# Patient Record
Sex: FEMALE | Race: WHITE | NOT HISPANIC OR LATINO
[De-identification: names, ages, dates, MRNs, and addresses within clinical notes are randomized per-mention and may not be internally consistent; named-entity substitution may affect disease eponyms.]

---

## 2019-11-13 ENCOUNTER — APPOINTMENT (OUTPATIENT)
Dept: INTERNAL MEDICINE | Facility: CLINIC | Age: 74
End: 2019-11-13
Payer: MEDICARE

## 2019-11-13 VITALS
WEIGHT: 149 LBS | HEIGHT: 61 IN | HEART RATE: 84 BPM | DIASTOLIC BLOOD PRESSURE: 80 MMHG | TEMPERATURE: 98.2 F | OXYGEN SATURATION: 96 % | BODY MASS INDEX: 28.13 KG/M2 | SYSTOLIC BLOOD PRESSURE: 140 MMHG

## 2019-11-13 VITALS — SYSTOLIC BLOOD PRESSURE: 122 MMHG | DIASTOLIC BLOOD PRESSURE: 80 MMHG

## 2019-11-13 DIAGNOSIS — Z87.42 PERSONAL HISTORY OF OTHER DISEASES OF THE FEMALE GENITAL TRACT: ICD-10-CM

## 2019-11-13 DIAGNOSIS — Z87.891 PERSONAL HISTORY OF NICOTINE DEPENDENCE: ICD-10-CM

## 2019-11-13 DIAGNOSIS — Z80.9 FAMILY HISTORY OF MALIGNANT NEOPLASM, UNSPECIFIED: ICD-10-CM

## 2019-11-13 DIAGNOSIS — Z80.0 FAMILY HISTORY OF MALIGNANT NEOPLASM OF DIGESTIVE ORGANS: ICD-10-CM

## 2019-11-13 DIAGNOSIS — Z82.49 FAMILY HISTORY OF ISCHEMIC HEART DISEASE AND OTHER DISEASES OF THE CIRCULATORY SYSTEM: ICD-10-CM

## 2019-11-13 DIAGNOSIS — Z80.3 FAMILY HISTORY OF MALIGNANT NEOPLASM OF BREAST: ICD-10-CM

## 2019-11-13 DIAGNOSIS — E55.9 VITAMIN D DEFICIENCY, UNSPECIFIED: ICD-10-CM

## 2019-11-13 DIAGNOSIS — Z80.52 FAMILY HISTORY OF MALIGNANT NEOPLASM OF BLADDER: ICD-10-CM

## 2019-11-13 DIAGNOSIS — Z81.8 FAMILY HISTORY OF OTHER MENTAL AND BEHAVIORAL DISORDERS: ICD-10-CM

## 2019-11-13 DIAGNOSIS — S92.902A UNSPECIFIED FRACTURE OF LEFT FOOT, INITIAL ENCOUNTER FOR CLOSED FRACTURE: ICD-10-CM

## 2019-11-13 PROCEDURE — 90662 IIV NO PRSV INCREASED AG IM: CPT

## 2019-11-13 PROCEDURE — G0008: CPT

## 2019-11-13 PROCEDURE — 99204 OFFICE O/P NEW MOD 45 MIN: CPT | Mod: 25

## 2019-11-13 RX ORDER — BENZALKONIUM CHLORIDE, SODIUM PHOSPHATE, DIBASIC, EDETATE DISODIUM,SODIUM PHOSPHATE, MONOBASIC, SODIUM CHLORIDE, WATER, PHOSPHORIC ACID, SODIUM HYDROXIDE 14 MG/ML
1.4 SOLUTION INTRAOCULAR 4 TIMES DAILY
Refills: 0 | Status: ACTIVE | COMMUNITY
Start: 2019-11-13

## 2019-11-13 NOTE — HISTORY OF PRESENT ILLNESS
[FreeTextEntry8] : Patient presented for the first time for transition of care, she's looking for a new PCP.  Her PCP, Dr. Lira, has retired.  Her last PE and blood tests was about 6 months ago.\par \par She needs Flu vaccine and renewal of medications.\par \par Pt is currently on ABx, low dose for 2 months for recurrent UTI.

## 2019-11-13 NOTE — PHYSICAL EXAM
[No Acute Distress] : no acute distress [Well Nourished] : well nourished [Well Developed] : well developed [No JVD] : no jugular venous distention [Supple] : supple [No Respiratory Distress] : no respiratory distress  [Clear to Auscultation] : lungs were clear to auscultation bilaterally [Normal Rate] : normal rate  [Regular Rhythm] : with a regular rhythm [Normal S1, S2] : normal S1 and S2 [No Edema] : there was no peripheral edema [No Extremity Clubbing/Cyanosis] : no extremity clubbing/cyanosis [Soft] : abdomen soft [Non Tender] : non-tender [Non-distended] : non-distended [Normal Bowel Sounds] : normal bowel sounds [No CVA Tenderness] : no CVA  tenderness [No Spinal Tenderness] : no spinal tenderness [No Joint Swelling] : no joint swelling [Grossly Normal Strength/Tone] : grossly normal strength/tone [Speech Grossly Normal] : speech grossly normal [Normal Affect] : the affect was normal [Alert and Oriented x3] : oriented to person, place, and time [Normal Mood] : the mood was normal [de-identified] : overweight female in stated age,

## 2019-11-21 LAB
ALBUMIN SERPL ELPH-MCNC: 4.3 G/DL
ALP BLD-CCNC: 97 U/L
ALT SERPL-CCNC: 11 U/L
ANION GAP SERPL CALC-SCNC: 13 MMOL/L
AST SERPL-CCNC: 26 U/L
BILIRUB SERPL-MCNC: 0.6 MG/DL
BUN SERPL-MCNC: 16 MG/DL
CALCIUM SERPL-MCNC: 10.3 MG/DL
CHLORIDE SERPL-SCNC: 101 MMOL/L
CHOLEST SERPL-MCNC: 235 MG/DL
CHOLEST/HDLC SERPL: 3.9 RATIO
CK SERPL-CCNC: 39 U/L
CO2 SERPL-SCNC: 28 MMOL/L
CREAT SERPL-MCNC: 0.75 MG/DL
ESTIMATED AVERAGE GLUCOSE: 105 MG/DL
GLUCOSE SERPL-MCNC: 97 MG/DL
HBA1C MFR BLD HPLC: 5.3 %
HDLC SERPL-MCNC: 61 MG/DL
LDLC SERPL CALC-MCNC: 156 MG/DL
POTASSIUM SERPL-SCNC: 4.5 MMOL/L
PROT SERPL-MCNC: 6.6 G/DL
SODIUM SERPL-SCNC: 142 MMOL/L
TRIGL SERPL-MCNC: 92 MG/DL

## 2020-01-24 ENCOUNTER — MOBILE ON CALL (OUTPATIENT)
Age: 75
End: 2020-01-24

## 2020-01-27 LAB
ALBUMIN SERPL ELPH-MCNC: 4.4 G/DL
ALP BLD-CCNC: 96 U/L
ALT SERPL-CCNC: 26 U/L
ANION GAP SERPL CALC-SCNC: 15 MMOL/L
AST SERPL-CCNC: 29 U/L
BILIRUB SERPL-MCNC: 0.6 MG/DL
BUN SERPL-MCNC: 20 MG/DL
CALCIUM SERPL-MCNC: 10.4 MG/DL
CHLORIDE SERPL-SCNC: 102 MMOL/L
CHOLEST SERPL-MCNC: 147 MG/DL
CHOLEST/HDLC SERPL: 2.7 RATIO
CK SERPL-CCNC: 125 U/L
CO2 SERPL-SCNC: 25 MMOL/L
CREAT SERPL-MCNC: 0.79 MG/DL
GLUCOSE SERPL-MCNC: 106 MG/DL
HDLC SERPL-MCNC: 54 MG/DL
LDLC SERPL CALC-MCNC: 81 MG/DL
POTASSIUM SERPL-SCNC: 4.6 MMOL/L
PROT SERPL-MCNC: 6.4 G/DL
SODIUM SERPL-SCNC: 143 MMOL/L
TRIGL SERPL-MCNC: 57 MG/DL

## 2020-02-03 ENCOUNTER — APPOINTMENT (OUTPATIENT)
Dept: INTERNAL MEDICINE | Facility: CLINIC | Age: 75
End: 2020-02-03
Payer: MEDICARE

## 2020-02-03 VITALS
BODY MASS INDEX: 29.64 KG/M2 | DIASTOLIC BLOOD PRESSURE: 88 MMHG | OXYGEN SATURATION: 95 % | HEART RATE: 84 BPM | TEMPERATURE: 98.6 F | HEIGHT: 61 IN | SYSTOLIC BLOOD PRESSURE: 130 MMHG | WEIGHT: 157 LBS

## 2020-02-03 VITALS — DIASTOLIC BLOOD PRESSURE: 84 MMHG | SYSTOLIC BLOOD PRESSURE: 142 MMHG

## 2020-02-03 PROCEDURE — 99214 OFFICE O/P EST MOD 30 MIN: CPT

## 2020-02-03 RX ORDER — SERTRALINE HYDROCHLORIDE 100 MG/1
100 TABLET, FILM COATED ORAL
Qty: 90 | Refills: 1 | Status: COMPLETED | COMMUNITY
Start: 2019-11-13 | End: 2020-02-03

## 2020-02-03 RX ORDER — CHROMIUM 200 MCG
25 MCG TABLET ORAL DAILY
Refills: 0 | Status: ACTIVE | COMMUNITY
Start: 2020-02-03

## 2020-02-03 NOTE — HISTORY OF PRESENT ILLNESS
[de-identified] : Pt presented for f/u.  She had labs done and would like to review results.\par \par Pt woke up a couple of months ago, and had to drag her L leg, and went to see ortho.  She was told she has a pinched nerve.  It was recommended that she has injection to her back, and pt refused.  She went for PT, but she thinks that made it worse.  She had more pain and more difficulty walking.  She used to go to chiropractor and is planning to go do that. She also is getting an inversion table.\par \par She sees a psychiatrist and was started on a new medication 2 weeks ago.  She no longer takes Zoloft.  She feels better now.was having some SEs.  She cut it down to 1/2 tablet of 5 mg (+2.5 mg) daily.\par \par She also has knee pain, L > R.

## 2020-02-03 NOTE — ASSESSMENT
[FreeTextEntry1] : I am still trying to obtain records from her previous MD, as well as records from the orthopedic surgeon.  Patient believes she is due for a physical exam within the next few months.  She will schedule appointment and have her labs done prior to her visit.

## 2020-02-03 NOTE — PHYSICAL EXAM
[No Acute Distress] : no acute distress [Well Nourished] : well nourished [Well Developed] : well developed [No JVD] : no jugular venous distention [No Respiratory Distress] : no respiratory distress  [Clear to Auscultation] : lungs were clear to auscultation bilaterally [Normal Rate] : normal rate  [Regular Rhythm] : with a regular rhythm [Normal S1, S2] : normal S1 and S2 [No Edema] : there was no peripheral edema [No Extremity Clubbing/Cyanosis] : no extremity clubbing/cyanosis [Soft] : abdomen soft [Non Tender] : non-tender [Normal Bowel Sounds] : normal bowel sounds [Normal Supraclavicular Nodes] : no supraclavicular lymphadenopathy [Normal Posterior Cervical Nodes] : no posterior cervical lymphadenopathy [Normal Anterior Cervical Nodes] : no anterior cervical lymphadenopathy [No CVA Tenderness] : no CVA  tenderness [No Spinal Tenderness] : no spinal tenderness [No Joint Swelling] : no joint swelling [Grossly Normal Strength/Tone] : grossly normal strength/tone [Speech Grossly Normal] : speech grossly normal [Normal Affect] : the affect was normal [Alert and Oriented x3] : oriented to person, place, and time [Normal Mood] : the mood was normal [de-identified] : Overweight female in stated age,

## 2020-05-18 ENCOUNTER — RX RENEWAL (OUTPATIENT)
Age: 75
End: 2020-05-18

## 2020-05-20 ENCOUNTER — RX RENEWAL (OUTPATIENT)
Age: 75
End: 2020-05-20

## 2020-08-11 LAB
25(OH)D3 SERPL-MCNC: 62.3 NG/ML
ALBUMIN SERPL ELPH-MCNC: 4 G/DL
ALP BLD-CCNC: 102 U/L
ALT SERPL-CCNC: 26 U/L
ANION GAP SERPL CALC-SCNC: 13 MMOL/L
APPEARANCE: CLEAR
AST SERPL-CCNC: 30 U/L
BASOPHILS # BLD AUTO: 0.05 K/UL
BASOPHILS NFR BLD AUTO: 0.5 %
BILIRUB SERPL-MCNC: 0.7 MG/DL
BILIRUBIN URINE: NEGATIVE
BLOOD URINE: NEGATIVE
BUN SERPL-MCNC: 21 MG/DL
CALCIUM SERPL-MCNC: 10.1 MG/DL
CHLORIDE SERPL-SCNC: 105 MMOL/L
CHOLEST SERPL-MCNC: 121 MG/DL
CHOLEST/HDLC SERPL: 2.5 RATIO
CK SERPL-CCNC: 53 U/L
CO2 SERPL-SCNC: 26 MMOL/L
COLOR: YELLOW
CREAT SERPL-MCNC: 0.85 MG/DL
CRP SERPL-MCNC: 0.73 MG/DL
EOSINOPHIL # BLD AUTO: 0.22 K/UL
EOSINOPHIL NFR BLD AUTO: 2.1 %
ERYTHROCYTE [SEDIMENTATION RATE] IN BLOOD BY WESTERGREN METHOD: 14 MM/HR
ESTIMATED AVERAGE GLUCOSE: 117 MG/DL
GLUCOSE QUALITATIVE U: NEGATIVE
GLUCOSE SERPL-MCNC: 110 MG/DL
HBA1C MFR BLD HPLC: 5.7 %
HCT VFR BLD CALC: 44.6 %
HDLC SERPL-MCNC: 49 MG/DL
HGB BLD-MCNC: 13.6 G/DL
IMM GRANULOCYTES NFR BLD AUTO: 0.4 %
KETONES URINE: NEGATIVE
LDLC SERPL CALC-MCNC: 63 MG/DL
LEUKOCYTE ESTERASE URINE: NEGATIVE
LYMPHOCYTES # BLD AUTO: 2.95 K/UL
LYMPHOCYTES NFR BLD AUTO: 27.9 %
MAN DIFF?: NORMAL
MCHC RBC-ENTMCNC: 28 PG
MCHC RBC-ENTMCNC: 30.5 GM/DL
MCV RBC AUTO: 92 FL
MONOCYTES # BLD AUTO: 0.72 K/UL
MONOCYTES NFR BLD AUTO: 6.8 %
NEUTROPHILS # BLD AUTO: 6.61 K/UL
NEUTROPHILS NFR BLD AUTO: 62.3 %
NITRITE URINE: NEGATIVE
PH URINE: 6
PLATELET # BLD AUTO: 226 K/UL
POTASSIUM SERPL-SCNC: 4.1 MMOL/L
PROT SERPL-MCNC: 6.7 G/DL
PROTEIN URINE: NORMAL
RBC # BLD: 4.85 M/UL
RBC # FLD: 14 %
SODIUM SERPL-SCNC: 144 MMOL/L
SPECIFIC GRAVITY URINE: 1.03
T4 FREE SERPL-MCNC: 1.2 NG/DL
TRIGL SERPL-MCNC: 48 MG/DL
TSH SERPL-ACNC: 0.83 UIU/ML
UROBILINOGEN URINE: NORMAL
WBC # FLD AUTO: 10.59 K/UL

## 2020-08-13 ENCOUNTER — APPOINTMENT (OUTPATIENT)
Dept: INTERNAL MEDICINE | Facility: CLINIC | Age: 75
End: 2020-08-13
Payer: MEDICARE

## 2020-08-13 VITALS
OXYGEN SATURATION: 95 % | DIASTOLIC BLOOD PRESSURE: 79 MMHG | BODY MASS INDEX: 30.78 KG/M2 | HEIGHT: 61 IN | HEART RATE: 98 BPM | SYSTOLIC BLOOD PRESSURE: 113 MMHG | WEIGHT: 163 LBS

## 2020-08-13 VITALS — TEMPERATURE: 98.7 F | DIASTOLIC BLOOD PRESSURE: 72 MMHG | SYSTOLIC BLOOD PRESSURE: 110 MMHG

## 2020-08-13 DIAGNOSIS — E66.3 OVERWEIGHT: ICD-10-CM

## 2020-08-13 DIAGNOSIS — M19.90 UNSPECIFIED OSTEOARTHRITIS, UNSPECIFIED SITE: ICD-10-CM

## 2020-08-13 DIAGNOSIS — R73.02 IMPAIRED GLUCOSE TOLERANCE (ORAL): ICD-10-CM

## 2020-08-13 DIAGNOSIS — E04.2 NONTOXIC MULTINODULAR GOITER: ICD-10-CM

## 2020-08-13 PROCEDURE — G0009: CPT

## 2020-08-13 PROCEDURE — G0439: CPT

## 2020-08-13 PROCEDURE — G0444 DEPRESSION SCREEN ANNUAL: CPT | Mod: 59

## 2020-08-13 PROCEDURE — 93000 ELECTROCARDIOGRAM COMPLETE: CPT

## 2020-08-13 PROCEDURE — 90670 PCV13 VACCINE IM: CPT

## 2020-08-13 RX ORDER — NITROFURANTOIN MACROCRYSTALS 50 MG/1
50 CAPSULE ORAL
Qty: 30 | Refills: 2 | Status: COMPLETED | COMMUNITY
Start: 2019-11-13 | End: 2020-08-13

## 2020-08-13 RX ORDER — VITAMIN B COMPLEX
CAPSULE ORAL DAILY
Refills: 0 | Status: ACTIVE | COMMUNITY
Start: 2020-08-13

## 2020-08-13 NOTE — DATA REVIEWED
[FreeTextEntry1] : thyroid US - 08/2019\par \par Derm - >5 years ago\par \par EKG - deferred, pt stated she had EKG and stress test with cardiologist a couple of months ago.

## 2020-08-13 NOTE — REVIEW OF SYSTEMS
[Negative] : Heme/Lymph [Lower Ext Edema] : lower extremity edema [Joint Pain] : joint pain [Muscle Pain] : muscle pain [Depression] : depression [Insomnia] : insomnia [Anxiety] : anxiety [Fever] : no fever [Chills] : no chills [Fatigue] : no fatigue [Recent Change In Weight] : ~T no recent weight change [Chest Pain] : no chest pain [Palpitations] : no palpitations [Shortness Of Breath] : no shortness of breath [Wheezing] : no wheezing [Cough] : no cough [Constipation] : no constipation [Diarrhea] : diarrhea [Melena] : no melena [Dysuria] : no dysuria [Incontinence] : no incontinence [Nocturia] : no nocturia [Hematuria] : no hematuria [Back Pain] : no back pain [Itching] : no itching [Mole Changes] : no mole changes [Skin Rash] : no skin rash [Headache] : no headache [Dizziness] : no dizziness [Fainting] : no fainting [Unsteady Walking] : no ataxia [FreeTextEntry3] : wears reading glasses,  [FreeTextEntry5] : occ L ankle swelling, had injury in the past, [FreeTextEntry9] : Has fibromyalgia, [de-identified] : takes Klonopin, as in HPI,

## 2020-08-13 NOTE — COUNSELING
[Fall prevention counseling provided] : Fall prevention counseling provided [Adequate lighting] : Adequate lighting [No throw rugs] : No throw rugs [Use proper foot wear] : Use proper foot wear [Potential consequences of obesity discussed] : Potential consequences of obesity discussed [Benefits of weight loss discussed] : Benefits of weight loss discussed [Encouraged to increase physical activity] : Encouraged to increase physical activity [Target Wt Loss Goal ___] : Weight Loss Goals: Target weight loss goal [unfilled] lbs [Decrease Portions] : decrease portions [____ min/wk Activity] : [unfilled] min/wk activity [Needs reinforcement, provided] : Patient needs reinforcement on understanding of disease, goals and obesity follow-up plan; reinforcement was provided

## 2020-08-13 NOTE — PAST MEDICAL HISTORY
[Postmenopausal] : postmenopausal [Menopause Age____] : age at menopause was [unfilled] [Menarche Age ____] : age at menarche was [unfilled] [Total Preg ___] : G[unfilled] [Live Births ___] : P[unfilled]

## 2020-08-13 NOTE — ASSESSMENT
[FreeTextEntry1] : Patient is up-to-date with all HCM tests.  She is scheduled to repeat breast imaging studies and has prescription to do so.  She is also scheduled to have a repeat colonoscopy in a few days.  She was reminded to have routine eye exam, dental care and skin exam with dermatologist.

## 2020-08-13 NOTE — PHYSICAL EXAM
[No Acute Distress] : no acute distress [Well Nourished] : well nourished [Well Developed] : well developed [Normal Sclera/Conjunctiva] : normal sclera/conjunctiva [PERRL] : pupils equal round and reactive to light [EOMI] : extraocular movements intact [Normal Oropharynx] : the oropharynx was normal [No JVD] : no jugular venous distention [Normal Outer Ear/Nose] : the outer ears and nose were normal in appearance [No Lymphadenopathy] : no lymphadenopathy [Supple] : supple [No Respiratory Distress] : no respiratory distress  [Clear to Auscultation] : lungs were clear to auscultation bilaterally [Normal Rate] : normal rate  [Regular Rhythm] : with a regular rhythm [Normal S1, S2] : normal S1 and S2 [No Carotid Bruits] : no carotid bruits [Pedal Pulses Present] : the pedal pulses are present [No Edema] : there was no peripheral edema [No Extremity Clubbing/Cyanosis] : no extremity clubbing/cyanosis [Soft] : abdomen soft [Non Tender] : non-tender [Non-distended] : non-distended [Normal Posterior Cervical Nodes] : no posterior cervical lymphadenopathy [Normal Bowel Sounds] : normal bowel sounds [No CVA Tenderness] : no CVA  tenderness [Normal Anterior Cervical Nodes] : no anterior cervical lymphadenopathy [No Spinal Tenderness] : no spinal tenderness [Grossly Normal Strength/Tone] : grossly normal strength/tone [No Joint Swelling] : no joint swelling [No Rash] : no rash [Coordination Grossly Intact] : coordination grossly intact [No Focal Deficits] : no focal deficits [Normal Gait] : normal gait [Normal Affect] : the affect was normal [Normal TMs] : both tympanic membranes were normal [Normal Nasal Mucosa] : the nasal mucosa was normal [Normal Appearance] : normal in appearance [No Masses] : no palpable masses [No Nipple Discharge] : no nipple discharge [No Axillary Lymphadenopathy] : no axillary lymphadenopathy [Declined Rectal Exam] : declined rectal exam [Normal Supraclavicular Nodes] : no supraclavicular lymphadenopathy [Normal Axillary Nodes] : no axillary lymphadenopathy [Speech Grossly Normal] : speech grossly normal [Alert and Oriented x3] : oriented to person, place, and time [Normal Mood] : the mood was normal [de-identified] : female in stated age,  [de-identified] : thyromegaly noted, [FreeTextEntry1] : Pt refused, as she is scheduled to have a repeat colonoscopy in a few days.

## 2020-08-13 NOTE — HISTORY OF PRESENT ILLNESS
[de-identified] : Pt presented for PE.  Last PE was was in 2019 with another MD.  Pt started seeing me in 11/2019.\par \par Pt is followed by psychiatrist and taking Zoloft, pt had SE, and she then taking the lower dose but still did not like it..  She was switched over to Olanzapine, but only take 1/2 of 5 mg.  She is on Klonopin at bedtime.  Her psychiatrist is not available to her and she is looking for a new psychiatrist.\par \par She also had some leg weakness, and ortho eval was significant for lumbar radiculopathy.  She was sent for PT, which did not help, and may have made it worse.  Her back pain is not bad unless she stands for too long, it's not bad with walking.  She has had epidural injection.  Her symptoms are not so bad now, she will f/u with ortho as needed.\par \par Pt was well and did not get sick throughout the COVID pandemic.   She was depressed because she couldn't travel to visit her children. \par \par Pt saw pulmonologist a few months ago, had Xray and PFTs, no new meds offered.  She was given inhaler in the past, and did not use it.\par \par Pt would like to have pneumonia vaccine.

## 2020-08-13 NOTE — HEALTH RISK ASSESSMENT
[Yes] : Yes [Monthly or less (1 pt)] : Monthly or less (1 point) [1 or 2 (0 pts)] : 1 or 2 (0 points) [Never (0 pts)] : Never (0 points) [No] : In the past 12 months have you used drugs other than those required for medical reasons? No [With Patient/Caregiver] : With Patient/Caregiver [Fair] : ~his/her~ current health as fair  [Poor] : ~his/her~ mood as  poor [No falls in past year] : Patient reported no falls in the past year [0] : 1) Little interest or pleasure doing things: Not at all (0) [2] : 2) Feeling down, depressed, or hopeless for more than half of the days (2) [None] : None [With Family] : lives with family [# of Members in Household ___] :  household currently consist of [unfilled] member(s) [Retired] : retired [College] : College [# Of Children ___] : has [unfilled] children [] :  [Feels Safe at Home] : Feels safe at home [Fully functional (using the telephone, shopping, preparing meals, housekeeping, doing laundry, using] : Fully functional and needs no help or supervision to perform IADLs (using the telephone, shopping, preparing meals, housekeeping, doing laundry, using transportation, managing medications and managing finances) [Fully functional (bathing, dressing, toileting, transferring, walking, feeding)] : Fully functional (bathing, dressing, toileting, transferring, walking, feeding) [Smoke Detector] : smoke detector [Seat Belt] :  uses seat belt [Carbon Monoxide Detector] : carbon monoxide detector [] : No [Audit-CScore] : 1 [de-identified] : very active, exercises 10 minutes every day [[id number]] : 2 [LowDoseCTScan] : 05/19 [EyeExamDate] : 12/19 [Change in mental status noted] : No change in mental status noted [Reports changes in hearing] : Reports no changes in hearing [Reports changes in vision] : Reports no changes in vision [Reports changes in dental health] : Reports no changes in dental health [MammogramDate] : 08/19 [MammogramComments] : US breast - 08/19 [PapSmearDate] : 08/19 [BoneDensityDate] : 08/19 [ColonoscopyDate] : 2017 [de-identified] : dentist - 03/20 [AdvancecareDate] : 08/20

## 2020-08-19 ENCOUNTER — TRANSCRIPTION ENCOUNTER (OUTPATIENT)
Age: 75
End: 2020-08-19

## 2020-12-03 ENCOUNTER — APPOINTMENT (OUTPATIENT)
Dept: INTERNAL MEDICINE | Facility: CLINIC | Age: 75
End: 2020-12-03
Payer: MEDICARE

## 2020-12-03 VITALS — TEMPERATURE: 97.8 F

## 2020-12-03 PROCEDURE — 99442: CPT | Mod: 95

## 2020-12-03 NOTE — HISTORY OF PRESENT ILLNESS
[Home] : at home, [unfilled] , at the time of the visit. [Medical Office: ([facility])___] : at the medical office located in  [FreeTextEntry8] : Pt presented for this telephone c/o not feeling well.  She has been having cold sweats for about a week, and is getting worse.  She denied temp, her temp are less than 99 the last few days.  When she eats, she starts coughing with no phlegm and sneezing, this only occurs with breakfast and last about 10 minutes, she also has white rhinorrhea.  She feels like she is nauseas.  She had cold sweats and chills last night and they persisted continuously through this morning, she still has those sweats and chills at the moment of this telephone call.  She feels weak, and does not have much of an appetite.\par \par She also has abdominal pain, no diarrhea.  The pain is intermittent and last a few minutes.  The pain is on L lower abdomen.  She also has some constipation.  Her abdominal pain is better after she has a BMs.  She denied change of urinary habits.\par \par Pt lives alone, and is very reluctant to go to urgent care center for evaluation and COVID testing, because of the long line.

## 2020-12-03 NOTE — ASSESSMENT
[FreeTextEntry1] : 75-year-old female presented with 1-1/2-week of not feeling well.  She started with having cold sweats every night, a cough in the morning after breakfast, nausea, intermittent left-sided abdominal pain and malaise.  She has been checking her temperature and reported no fever.  Her symptoms has been worsening, and she developed cold sweats last night that did not hayley last until the time of our appointment in the late morning.\par \par Differential diagnosis of her illness is very broad.  Given that we are in the midst of COVID-19 pandemic, the patient needs to be tested for COVID-19.  Other possibility could be influenza, UTI, URI/pneumonia/bronchitis, diverticulitis or colitis.  Without the exam, it would be very difficult to make a diagnosis.  I offered to send in a team to do blood tests at home, but we may not have any results for at least 2 to 3 days.  Patient is extremely reluctant to go to urgent care center, I advised that she go to ED for further evaluation and management, especially because her symptoms seems to be worsening.  She was agreeable and will call someone to take her to the ED today.

## 2020-12-04 ENCOUNTER — NON-APPOINTMENT (OUTPATIENT)
Age: 75
End: 2020-12-04

## 2020-12-05 ENCOUNTER — TRANSCRIPTION ENCOUNTER (OUTPATIENT)
Age: 75
End: 2020-12-05

## 2020-12-07 ENCOUNTER — APPOINTMENT (OUTPATIENT)
Dept: INTERNAL MEDICINE | Facility: CLINIC | Age: 75
End: 2020-12-07
Payer: MEDICARE

## 2020-12-07 VITALS
OXYGEN SATURATION: 95 % | DIASTOLIC BLOOD PRESSURE: 78 MMHG | WEIGHT: 139 LBS | HEART RATE: 107 BPM | HEIGHT: 61 IN | BODY MASS INDEX: 26.24 KG/M2 | SYSTOLIC BLOOD PRESSURE: 116 MMHG | TEMPERATURE: 98.9 F

## 2020-12-07 DIAGNOSIS — R53.81 OTHER MALAISE: ICD-10-CM

## 2020-12-07 DIAGNOSIS — Z87.898 PERSONAL HISTORY OF OTHER SPECIFIED CONDITIONS: ICD-10-CM

## 2020-12-07 DIAGNOSIS — R68.89 OTHER GENERAL SYMPTOMS AND SIGNS: ICD-10-CM

## 2020-12-07 PROCEDURE — 99213 OFFICE O/P EST LOW 20 MIN: CPT | Mod: 25

## 2020-12-07 PROCEDURE — G0008: CPT

## 2020-12-07 PROCEDURE — 90662 IIV NO PRSV INCREASED AG IM: CPT

## 2020-12-08 ENCOUNTER — NON-APPOINTMENT (OUTPATIENT)
Age: 75
End: 2020-12-08

## 2020-12-08 PROBLEM — Z87.898 HISTORY OF ABDOMINAL PAIN: Status: RESOLVED | Noted: 2020-12-03 | Resolved: 2020-12-08

## 2020-12-08 PROBLEM — R68.89 COLD SWEAT: Status: RESOLVED | Noted: 2020-12-03 | Resolved: 2020-12-08

## 2020-12-08 PROBLEM — Z87.898 HISTORY OF NAUSEA: Status: RESOLVED | Noted: 2020-12-03 | Resolved: 2020-12-08

## 2020-12-08 PROBLEM — R53.81 MALAISE: Status: RESOLVED | Noted: 2020-12-03 | Resolved: 2020-12-08

## 2020-12-08 RX ORDER — OLANZAPINE 5 MG/1
5 TABLET, FILM COATED ORAL
Refills: 0 | Status: COMPLETED | COMMUNITY
Start: 2020-02-03 | End: 2020-12-08

## 2020-12-08 RX ORDER — CLONAZEPAM 1 MG/1
1 TABLET ORAL
Refills: 0 | Status: COMPLETED | COMMUNITY
Start: 2019-11-13 | End: 2020-12-08

## 2020-12-08 NOTE — HISTORY OF PRESENT ILLNESS
[de-identified] : Pt presented for f/u after a visit to ED.  Pt was having chills for 1 1/2 week, then went to ED and was diagnosed with UTI.  She was started on Bactrim.  She had lower abdominal yesterday for about 5 hours, and the pain was about 5/10.  It resolved completely after a few hours.  She also had some chills yesterday.\par \par She feels about 80% better today.  \par \par She was hospitalized in 11/2020 for 1 week at [facility] for worsening of anxiety, she is now seeing a psychiatrist once a month.\par \par Pt would like to have flu vaccine today.

## 2020-12-08 NOTE — PHYSICAL EXAM
[No Acute Distress] : no acute distress [Well Nourished] : well nourished [Well Developed] : well developed [No JVD] : no jugular venous distention [Supple] : supple [No Respiratory Distress] : no respiratory distress  [Clear to Auscultation] : lungs were clear to auscultation bilaterally [Normal Rate] : normal rate  [Regular Rhythm] : with a regular rhythm [Normal S1, S2] : normal S1 and S2 [No Edema] : there was no peripheral edema [Soft] : abdomen soft [Non Tender] : non-tender [Normal Bowel Sounds] : normal bowel sounds [Normal Supraclavicular Nodes] : no supraclavicular lymphadenopathy [Normal Posterior Cervical Nodes] : no posterior cervical lymphadenopathy [Normal Anterior Cervical Nodes] : no anterior cervical lymphadenopathy [No CVA Tenderness] : no CVA  tenderness [No Spinal Tenderness] : no spinal tenderness [No Joint Swelling] : no joint swelling [Grossly Normal Strength/Tone] : grossly normal strength/tone [Speech Grossly Normal] : speech grossly normal [Normal Affect] : the affect was normal [Alert and Oriented x3] : oriented to person, place, and time [Normal Mood] : the mood was normal [de-identified] : female in stated age,

## 2020-12-09 LAB
APPEARANCE: ABNORMAL
BACTERIA UR CULT: NORMAL
BACTERIA: NEGATIVE
BILIRUBIN URINE: NEGATIVE
BLOOD URINE: NEGATIVE
COLOR: YELLOW
GLUCOSE QUALITATIVE U: NEGATIVE
HYALINE CASTS: 0 /LPF
KETONES URINE: NEGATIVE
LEUKOCYTE ESTERASE URINE: NEGATIVE
MICROSCOPIC-UA: NORMAL
NITRITE URINE: NEGATIVE
PH URINE: 7
PROTEIN URINE: ABNORMAL
RED BLOOD CELLS URINE: 2 /HPF
SPECIFIC GRAVITY URINE: 1.02
SQUAMOUS EPITHELIAL CELLS: 10 /HPF
UROBILINOGEN URINE: ABNORMAL
WHITE BLOOD CELLS URINE: 4 /HPF

## 2020-12-15 ENCOUNTER — NON-APPOINTMENT (OUTPATIENT)
Age: 75
End: 2020-12-15

## 2021-02-03 ENCOUNTER — NON-APPOINTMENT (OUTPATIENT)
Age: 76
End: 2021-02-03

## 2021-03-05 ENCOUNTER — APPOINTMENT (OUTPATIENT)
Dept: INTERNAL MEDICINE | Facility: CLINIC | Age: 76
End: 2021-03-05
Payer: MEDICARE

## 2021-03-05 VITALS
SYSTOLIC BLOOD PRESSURE: 126 MMHG | TEMPERATURE: 99 F | HEART RATE: 78 BPM | DIASTOLIC BLOOD PRESSURE: 84 MMHG | WEIGHT: 148 LBS | HEIGHT: 61 IN | OXYGEN SATURATION: 96 % | BODY MASS INDEX: 27.94 KG/M2

## 2021-03-05 VITALS — DIASTOLIC BLOOD PRESSURE: 76 MMHG | SYSTOLIC BLOOD PRESSURE: 120 MMHG

## 2021-03-05 DIAGNOSIS — J43.9 EMPHYSEMA, UNSPECIFIED: ICD-10-CM

## 2021-03-05 PROCEDURE — 99214 OFFICE O/P EST MOD 30 MIN: CPT

## 2021-03-05 RX ORDER — BROMFENAC SODIUM 0.7 MG/ML
0.07 SOLUTION/ DROPS OPHTHALMIC
Refills: 0 | Status: COMPLETED | COMMUNITY
Start: 2019-11-13 | End: 2021-03-05

## 2021-03-05 NOTE — HISTORY OF PRESENT ILLNESS
[de-identified] : Pt presented for follow up.\par \par Pt has been on low dose Nitrofurantoin for the last 2 weeks, but few that it hasn't helped her.\par \par She still has the occ episode of chills, not as bad as it was a few months ago.\par \par Last week, she was watching TV, and she had 3 episodes and slept for 4 hours straight. \par \par Pt already got 2 doses of the Pfizer COVID vaccine.\par \par Pt saw pulmonologist about possible lung nodule seen on imaging study with urgent care center.  Pulmonologist felt that there was no significant finding, and so no f/u needed.\par \par Pt had labs with GI and is waiting for result.

## 2021-03-10 ENCOUNTER — NON-APPOINTMENT (OUTPATIENT)
Age: 76
End: 2021-03-10

## 2021-03-30 ENCOUNTER — NON-APPOINTMENT (OUTPATIENT)
Age: 76
End: 2021-03-30

## 2021-08-09 ENCOUNTER — NON-APPOINTMENT (OUTPATIENT)
Age: 76
End: 2021-08-09

## 2021-08-10 ENCOUNTER — APPOINTMENT (OUTPATIENT)
Dept: INTERNAL MEDICINE | Facility: CLINIC | Age: 76
End: 2021-08-10
Payer: MEDICARE

## 2021-08-10 VITALS
BODY MASS INDEX: 26.81 KG/M2 | HEIGHT: 61 IN | DIASTOLIC BLOOD PRESSURE: 74 MMHG | SYSTOLIC BLOOD PRESSURE: 125 MMHG | TEMPERATURE: 98 F | OXYGEN SATURATION: 94 % | HEART RATE: 106 BPM | WEIGHT: 142 LBS

## 2021-08-10 VITALS — SYSTOLIC BLOOD PRESSURE: 128 MMHG | DIASTOLIC BLOOD PRESSURE: 76 MMHG

## 2021-08-10 DIAGNOSIS — Z00.00 ENCOUNTER FOR GENERAL ADULT MEDICAL EXAMINATION W/OUT ABNORMAL FINDINGS: ICD-10-CM

## 2021-08-10 DIAGNOSIS — M79.7 FIBROMYALGIA: ICD-10-CM

## 2021-08-10 PROCEDURE — 99496 TRANSJ CARE MGMT HIGH F2F 7D: CPT | Mod: 25

## 2021-08-10 PROCEDURE — 99214 OFFICE O/P EST MOD 30 MIN: CPT | Mod: 25

## 2021-08-10 RX ORDER — COLD-HOT PACK
50 EACH MISCELLANEOUS DAILY
Refills: 0 | Status: COMPLETED | COMMUNITY
Start: 2020-08-13 | End: 2021-08-10

## 2021-08-10 NOTE — HISTORY OF PRESENT ILLNESS
[Post-hospitalization from ___ Hospital] : Post-hospitalization from [unfilled] Hospital [Admitted on: ___] : The patient was admitted on [unfilled] [Discharged on ___] : discharged on [unfilled] [Discharge Summary] : discharge summary [Pertinent Labs] : pertinent labs [Discharge Med List] : discharge medication list [Med Reconciliation] : medication reconciliation has been completed [Patient Contacted By: ____] : and contacted by [unfilled] [FreeTextEntry2] : Pt had lower abdominal pain and went to urgent care center a couple of weeks ago.  She had CT of abdomen/pelvic, there was no diverticulitis.  She was noted to have possibly a lipoma in the duodenum.\par \par She was sent to GI, and GI was planning for an EGD.   However, her abdominal pain got worse, and called ambulance and was bought to [facility] where she was hospitalized for 9 days.  She was discharged 2 days ago.  She is on Doxycycline 100 mg BID, and Flagyl 500 mg TID, for unclear etiology.\par \par Urgent care center diagnosed her with UTI, and she was treated with ABx.  Urine studies in the hospital was unremarkable.\par \par In the hospital, she was placed on heart monitor, she apparently presented with near syncope.  Cardiac w/u was unremarkable as per pt.\par \par Due to her complaint of abdominal pain, and possible need for EGD due to CT finding.  EGD was done, but the scope was not long enough to get to the duodenum to evaluate the possible lipoma.  She was therefore discharge.\par \par She is still having pain every day, pain is steady and constant.  She is not constipated.  She has the occ loose stool.  She is able to eat and does not have any N/V.\par \par She woke up this morning with lower abdominal pain, 5/10, but pain is constant.  She has occ nausea, no vomiting, and cold sweats.

## 2021-08-10 NOTE — PHYSICAL EXAM
[No Acute Distress] : no acute distress [Well Nourished] : well nourished [Well Developed] : well developed [No JVD] : no jugular venous distention [Supple] : supple [No Respiratory Distress] : no respiratory distress  [Clear to Auscultation] : lungs were clear to auscultation bilaterally [Normal Rate] : normal rate  [Regular Rhythm] : with a regular rhythm [Normal S1, S2] : normal S1 and S2 [No Edema] : there was no peripheral edema [Soft] : abdomen soft [Non-distended] : non-distended [No Masses] : no abdominal mass palpated [Normal Bowel Sounds] : normal bowel sounds [Normal Supraclavicular Nodes] : no supraclavicular lymphadenopathy [Normal Posterior Cervical Nodes] : no posterior cervical lymphadenopathy [Normal Anterior Cervical Nodes] : no anterior cervical lymphadenopathy [No CVA Tenderness] : no CVA  tenderness [No Spinal Tenderness] : no spinal tenderness [No Joint Swelling] : no joint swelling [Grossly Normal Strength/Tone] : grossly normal strength/tone [Speech Grossly Normal] : speech grossly normal [Normal Affect] : the affect was normal [Alert and Oriented x3] : oriented to person, place, and time [79212 - High Complexity requires an extensive number of possible diagnoses and/or the management options, extensive complexity of the medical data (tests, etc.) to be reviewed, and a high risk of significant complications, morbidity, and/or mortality as w] : High Complexity  [de-identified] : female in stated age,  [de-identified] : diffusely tender with gentle palpation, [de-identified] : Emotion was labile, she was crying at times during OV,

## 2021-08-19 ENCOUNTER — APPOINTMENT (OUTPATIENT)
Dept: INTERNAL MEDICINE | Facility: CLINIC | Age: 76
End: 2021-08-19
Payer: MEDICARE

## 2021-08-19 VITALS
OXYGEN SATURATION: 95 % | DIASTOLIC BLOOD PRESSURE: 75 MMHG | BODY MASS INDEX: 28.13 KG/M2 | WEIGHT: 149 LBS | HEART RATE: 99 BPM | SYSTOLIC BLOOD PRESSURE: 121 MMHG | HEIGHT: 61 IN | TEMPERATURE: 98.6 F

## 2021-08-19 DIAGNOSIS — Z09 ENCOUNTER FOR FOLLOW-UP EXAMINATION AFTER COMPLETED TREATMENT FOR CONDITIONS OTHER THAN MALIGNANT NEOPLASM: ICD-10-CM

## 2021-08-19 DIAGNOSIS — R41.3 OTHER AMNESIA: ICD-10-CM

## 2021-08-19 PROCEDURE — 36415 COLL VENOUS BLD VENIPUNCTURE: CPT

## 2021-08-19 PROCEDURE — 99214 OFFICE O/P EST MOD 30 MIN: CPT | Mod: 25

## 2021-08-19 NOTE — HISTORY OF PRESENT ILLNESS
[FreeTextEntry1] : Establish care [de-identified] : 75 y/o f. hx of depression with anxiety, COPD, HTN, fibromyalgia presenting to [facility] care. Here with brother, Chris. 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 EGD was attempted but scope was not long enough to visualize duodenum.  She was discharged with follow up with her GI physician. Patient states her current GI is unable to perform EGD as does not have the scope to do so. She is pending an appointment with another GI physician in September. She notes intermittent lower abdominal pain w/ intermittent diarrhea which per patient is chronic. \par She is also complaining of chronic memory impairment, evaluated by neurologist in April with normal MRI and diagnosed with mild cognitive impairment.  She denies worsening memory, but remains frustrated that she has this. She is unable to describe what she forgets.\par Her brother has not noticed any worsening memory, but does indicate more anxiety.  \par The MMSE today was 27/30\par She suffers from depression w/ anxiety currently on seroquel, remeron and xanax PRN. She was scheduled to her psychiatrist, but had to reschedule as she was hospitalized. As yet to reschedule.\par

## 2021-08-19 NOTE — REVIEW OF SYSTEMS
[Abdominal Pain] : abdominal pain [Diarrhea] : diarrhea [Memory Loss] : memory loss [Anxiety] : anxiety [Depression] : depression [Negative] : Respiratory [Headache] : no headache [Dizziness] : no dizziness [Insomnia] : no insomnia

## 2021-08-19 NOTE — PHYSICAL EXAM
[Normal] : normal rate, regular rhythm, normal S1 and S2 and no murmur heard [Alert and Oriented x3] : oriented to person, place, and time [Soft] : abdomen soft [Non Tender] : non-tender [Non-distended] : non-distended [de-identified] : Patient appears anxious and at times frustrated with questions

## 2021-08-20 ENCOUNTER — NON-APPOINTMENT (OUTPATIENT)
Age: 76
End: 2021-08-20

## 2021-08-20 LAB — VIT B12 SERPL-MCNC: 400 PG/ML

## 2021-09-14 ENCOUNTER — APPOINTMENT (OUTPATIENT)
Dept: INTERNAL MEDICINE | Facility: CLINIC | Age: 76
End: 2021-09-14
Payer: MEDICARE

## 2021-09-14 VITALS
HEART RATE: 96 BPM | OXYGEN SATURATION: 95 % | TEMPERATURE: 98.7 F | BODY MASS INDEX: 28.32 KG/M2 | DIASTOLIC BLOOD PRESSURE: 87 MMHG | HEIGHT: 61 IN | SYSTOLIC BLOOD PRESSURE: 166 MMHG | RESPIRATION RATE: 18 BRPM | WEIGHT: 150 LBS

## 2021-09-14 LAB
BILIRUB UR QL STRIP: NORMAL
GLUCOSE UR-MCNC: NORMAL
HCG UR QL: 0.2 EU/DL
HGB UR QL STRIP.AUTO: NORMAL
KETONES UR-MCNC: NORMAL
LEUKOCYTE ESTERASE UR QL STRIP: NORMAL
NITRITE UR QL STRIP: NORMAL
PH UR STRIP: 7
PROT UR STRIP-MCNC: NORMAL
SP GR UR STRIP: 1.01

## 2021-09-14 PROCEDURE — 81003 URINALYSIS AUTO W/O SCOPE: CPT | Mod: QW

## 2021-09-14 PROCEDURE — 99213 OFFICE O/P EST LOW 20 MIN: CPT | Mod: 25

## 2021-09-14 NOTE — HISTORY OF PRESENT ILLNESS
[FreeTextEntry8] : CC: UTI symptoms\par \par Patient complaining of dysuria and increase in urination for past one day.  Denies abdominal pain, fevers, or back pain.\par

## 2021-09-16 LAB — BACTERIA UR CULT: NORMAL

## 2021-09-29 ENCOUNTER — APPOINTMENT (OUTPATIENT)
Dept: GASTROENTEROLOGY | Facility: CLINIC | Age: 76
End: 2021-09-29

## 2021-11-03 ENCOUNTER — NON-APPOINTMENT (OUTPATIENT)
Age: 76
End: 2021-11-03

## 2021-11-03 ENCOUNTER — APPOINTMENT (OUTPATIENT)
Dept: INTERNAL MEDICINE | Facility: CLINIC | Age: 76
End: 2021-11-03
Payer: MEDICARE

## 2021-11-03 VITALS
OXYGEN SATURATION: 96 % | HEART RATE: 94 BPM | HEIGHT: 61 IN | DIASTOLIC BLOOD PRESSURE: 74 MMHG | SYSTOLIC BLOOD PRESSURE: 118 MMHG | BODY MASS INDEX: 29.38 KG/M2 | WEIGHT: 155.6 LBS | TEMPERATURE: 98.7 F

## 2021-11-03 DIAGNOSIS — Z00.00 ENCOUNTER FOR GENERAL ADULT MEDICAL EXAMINATION W/OUT ABNORMAL FINDINGS: ICD-10-CM

## 2021-11-03 PROCEDURE — G0439: CPT

## 2021-11-03 PROCEDURE — 93000 ELECTROCARDIOGRAM COMPLETE: CPT

## 2021-11-03 PROCEDURE — 36415 COLL VENOUS BLD VENIPUNCTURE: CPT

## 2021-11-03 NOTE — HEALTH RISK ASSESSMENT
[No] : No [3] : 2) Feeling down, depressed, or hopeless for nearly every day (3) [PHQ-2 Positive] : PHQ-2 Positive [PHQ-9 Negative - No further assessment needed] : PHQ-9 Negative - No further assessment needed [I have developed a follow-up plan documented below in the note.] : I have developed a follow-up plan documented below in the note. [Fully functional (bathing, dressing, toileting, transferring, walking, feeding)] : Fully functional (bathing, dressing, toileting, transferring, walking, feeding) [Fully functional (using the telephone, shopping, preparing meals, housekeeping, doing laundry, using] : Fully functional and needs no help or supervision to perform IADLs (using the telephone, shopping, preparing meals, housekeeping, doing laundry, using transportation, managing medications and managing finances) [[id number]] : 6 [Reports changes in hearing] : Reports no changes in hearing [Reports changes in vision] : Reports no changes in vision [Reports changes in dental health] : Reports no changes in dental health

## 2021-11-03 NOTE — HISTORY OF PRESENT ILLNESS
[FreeTextEntry1] : CPE [de-identified] : Here for CPE\par Not utd w/ mammogram, needs referral\par No regular exercise\par Following w/ mental health provider for anxiety/depressed mood-unable to recall medications started

## 2021-11-03 NOTE — ASSESSMENT
[FreeTextEntry1] : Reviewed age appropriate preventive screening, encouraged daily exercise\par -Refer for mammogram

## 2021-11-04 ENCOUNTER — NON-APPOINTMENT (OUTPATIENT)
Age: 76
End: 2021-11-04

## 2021-11-04 LAB
ALBUMIN SERPL ELPH-MCNC: 3.9 G/DL
ALP BLD-CCNC: 108 U/L
ALT SERPL-CCNC: 36 U/L
ANION GAP SERPL CALC-SCNC: 13 MMOL/L
AST SERPL-CCNC: 23 U/L
BASOPHILS # BLD AUTO: 0.06 K/UL
BASOPHILS NFR BLD AUTO: 0.5 %
BILIRUB SERPL-MCNC: 0.5 MG/DL
BUN SERPL-MCNC: 23 MG/DL
CALCIUM SERPL-MCNC: 10 MG/DL
CHLORIDE SERPL-SCNC: 107 MMOL/L
CHOLEST SERPL-MCNC: 162 MG/DL
CO2 SERPL-SCNC: 22 MMOL/L
CREAT SERPL-MCNC: 0.87 MG/DL
EOSINOPHIL # BLD AUTO: 0.21 K/UL
EOSINOPHIL NFR BLD AUTO: 1.7 %
ESTIMATED AVERAGE GLUCOSE: 114 MG/DL
GLUCOSE SERPL-MCNC: 105 MG/DL
HBA1C MFR BLD HPLC: 5.6 %
HCT VFR BLD CALC: 46.4 %
HDLC SERPL-MCNC: 52 MG/DL
HGB BLD-MCNC: 14.8 G/DL
IMM GRANULOCYTES NFR BLD AUTO: 0.5 %
LDLC SERPL CALC-MCNC: 96 MG/DL
LYMPHOCYTES # BLD AUTO: 2.26 K/UL
LYMPHOCYTES NFR BLD AUTO: 18.7 %
MAN DIFF?: NORMAL
MCHC RBC-ENTMCNC: 28.5 PG
MCHC RBC-ENTMCNC: 31.9 GM/DL
MCV RBC AUTO: 89.4 FL
MONOCYTES # BLD AUTO: 0.73 K/UL
MONOCYTES NFR BLD AUTO: 6 %
NEUTROPHILS # BLD AUTO: 8.75 K/UL
NEUTROPHILS NFR BLD AUTO: 72.6 %
NONHDLC SERPL-MCNC: 110 MG/DL
PLATELET # BLD AUTO: 262 K/UL
POTASSIUM SERPL-SCNC: 3.7 MMOL/L
PROT SERPL-MCNC: 6 G/DL
RBC # BLD: 5.19 M/UL
RBC # FLD: 13.8 %
SODIUM SERPL-SCNC: 142 MMOL/L
TRIGL SERPL-MCNC: 71 MG/DL
WBC # FLD AUTO: 12.07 K/UL

## 2021-11-11 LAB
BASOPHILS # BLD AUTO: 0.05 K/UL
BASOPHILS NFR BLD AUTO: 0.6 %
EOSINOPHIL # BLD AUTO: 0.23 K/UL
EOSINOPHIL NFR BLD AUTO: 2.5 %
HCT VFR BLD CALC: 46.7 %
HGB BLD-MCNC: 14.7 G/DL
IMM GRANULOCYTES NFR BLD AUTO: 0.2 %
LYMPHOCYTES # BLD AUTO: 2.83 K/UL
LYMPHOCYTES NFR BLD AUTO: 31.3 %
MAN DIFF?: NORMAL
MCHC RBC-ENTMCNC: 27.6 PG
MCHC RBC-ENTMCNC: 31.5 GM/DL
MCV RBC AUTO: 87.8 FL
MONOCYTES # BLD AUTO: 0.72 K/UL
MONOCYTES NFR BLD AUTO: 8 %
NEUTROPHILS # BLD AUTO: 5.2 K/UL
NEUTROPHILS NFR BLD AUTO: 57.4 %
PLATELET # BLD AUTO: 243 K/UL
RBC # BLD: 5.32 M/UL
RBC # FLD: 13.8 %
WBC # FLD AUTO: 9.05 K/UL

## 2021-11-12 ENCOUNTER — NON-APPOINTMENT (OUTPATIENT)
Age: 76
End: 2021-11-12

## 2021-11-15 ENCOUNTER — APPOINTMENT (OUTPATIENT)
Dept: INTERNAL MEDICINE | Facility: CLINIC | Age: 76
End: 2021-11-15
Payer: MEDICARE

## 2021-11-15 VITALS
BODY MASS INDEX: 29.45 KG/M2 | OXYGEN SATURATION: 95 % | TEMPERATURE: 98.7 F | SYSTOLIC BLOOD PRESSURE: 147 MMHG | HEART RATE: 93 BPM | DIASTOLIC BLOOD PRESSURE: 80 MMHG | HEIGHT: 61 IN | WEIGHT: 156 LBS

## 2021-11-15 VITALS — DIASTOLIC BLOOD PRESSURE: 80 MMHG | SYSTOLIC BLOOD PRESSURE: 124 MMHG

## 2021-11-15 DIAGNOSIS — R53.83 OTHER FATIGUE: ICD-10-CM

## 2021-11-15 DIAGNOSIS — R10.30 LOWER ABDOMINAL PAIN, UNSPECIFIED: ICD-10-CM

## 2021-11-15 DIAGNOSIS — Z86.2 PERSONAL HISTORY OF DISEASES OF THE BLOOD AND BLOOD-FORMING ORGANS AND CERTAIN DISORDERS INVOLVING THE IMMUNE MECHANISM: ICD-10-CM

## 2021-11-15 DIAGNOSIS — Z87.898 PERSONAL HISTORY OF OTHER SPECIFIED CONDITIONS: ICD-10-CM

## 2021-11-15 DIAGNOSIS — Z23 ENCOUNTER FOR IMMUNIZATION: ICD-10-CM

## 2021-11-15 PROCEDURE — G0008: CPT

## 2021-11-15 PROCEDURE — 90662 IIV NO PRSV INCREASED AG IM: CPT

## 2021-11-15 PROCEDURE — 36415 COLL VENOUS BLD VENIPUNCTURE: CPT

## 2021-11-15 PROCEDURE — 99214 OFFICE O/P EST MOD 30 MIN: CPT | Mod: 25

## 2021-11-15 NOTE — HISTORY OF PRESENT ILLNESS
[FreeTextEntry1] : CC: Tried, concern for recurrent UTI [de-identified] : Notes feeling tired over last few weeks, no changes in sleep or appetite\par Did recently have Seroquel and Remeron increased by psychiatrist, but takes it at bedtime. She does not feel medications are helping her with her mood\par Also concerned about recurrent UTI hx-denies dysuria, hematuria or frequency-patient surprised that she has not had another infection\par Has sedentary lifestyle-no exercise\par

## 2021-11-15 NOTE — ASSESSMENT
[FreeTextEntry1] : Reviewed recent CPE labs-unremarkable\par Unclear if related to possible changes in psych medications, check other labs first, UA\par Encouraged daily walks

## 2021-11-16 LAB
25(OH)D3 SERPL-MCNC: 41.2 NG/ML
APPEARANCE: CLEAR
BILIRUBIN URINE: NEGATIVE
BLOOD URINE: NEGATIVE
COLOR: NORMAL
GLUCOSE QUALITATIVE U: NEGATIVE
KETONES URINE: NEGATIVE
LEUKOCYTE ESTERASE URINE: NEGATIVE
NITRITE URINE: NEGATIVE
PH URINE: 6
PROTEIN URINE: NEGATIVE
SPECIFIC GRAVITY URINE: 1.02
TSH SERPL-ACNC: 1.2 UIU/ML
UROBILINOGEN URINE: NORMAL
VIT B12 SERPL-MCNC: 331 PG/ML

## 2021-11-22 ENCOUNTER — RX RENEWAL (OUTPATIENT)
Age: 76
End: 2021-11-22

## 2021-12-07 ENCOUNTER — RX RENEWAL (OUTPATIENT)
Age: 76
End: 2021-12-07

## 2021-12-30 ENCOUNTER — RESULT CHARGE (OUTPATIENT)
Age: 76
End: 2021-12-30

## 2021-12-30 ENCOUNTER — APPOINTMENT (OUTPATIENT)
Dept: INTERNAL MEDICINE | Facility: CLINIC | Age: 76
End: 2021-12-30
Payer: MEDICARE

## 2021-12-30 VITALS
OXYGEN SATURATION: 96 % | HEIGHT: 61 IN | HEART RATE: 99 BPM | DIASTOLIC BLOOD PRESSURE: 81 MMHG | WEIGHT: 160 LBS | BODY MASS INDEX: 30.21 KG/M2 | SYSTOLIC BLOOD PRESSURE: 139 MMHG | TEMPERATURE: 98.2 F

## 2021-12-30 DIAGNOSIS — N39.0 URINARY TRACT INFECTION, SITE NOT SPECIFIED: ICD-10-CM

## 2021-12-30 LAB
BILIRUB UR QL STRIP: NEGATIVE
GLUCOSE UR-MCNC: NEGATIVE
HCG UR QL: 0.2 EU/DL
HGB UR QL STRIP.AUTO: NORMAL
KETONES UR-MCNC: NEGATIVE
LEUKOCYTE ESTERASE UR QL STRIP: NEGATIVE
NITRITE UR QL STRIP: NEGATIVE
PH UR STRIP: 6
PROT UR STRIP-MCNC: NEGATIVE
SP GR UR STRIP: 1.01

## 2021-12-30 PROCEDURE — 81003 URINALYSIS AUTO W/O SCOPE: CPT | Mod: QW

## 2021-12-30 PROCEDURE — 99213 OFFICE O/P EST LOW 20 MIN: CPT | Mod: 25

## 2021-12-30 NOTE — REVIEW OF SYSTEMS
[Abdominal Pain] : abdominal pain [Dysuria] : dysuria [Negative] : Constitutional [Hematuria] : no hematuria

## 2021-12-30 NOTE — HISTORY OF PRESENT ILLNESS
[FreeTextEntry8] : CC: Dysuria\par \par Complaining of abdominal pressure, dysuria for past one day. Symptoms c/w with her her previous UTIs.\par No gross hematuria or flank pain.\par \par

## 2021-12-30 NOTE — PHYSICAL EXAM
[Normal] : no acute distress, well nourished, well developed and well-appearing [Soft] : abdomen soft [Non Tender] : non-tender [Non-distended] : non-distended

## 2022-01-06 ENCOUNTER — RX RENEWAL (OUTPATIENT)
Age: 77
End: 2022-01-06

## 2022-01-14 ENCOUNTER — APPOINTMENT (OUTPATIENT)
Dept: INTERNAL MEDICINE | Facility: CLINIC | Age: 77
End: 2022-01-14
Payer: MEDICARE

## 2022-01-14 VITALS — DIASTOLIC BLOOD PRESSURE: 76 MMHG | SYSTOLIC BLOOD PRESSURE: 112 MMHG

## 2022-01-14 DIAGNOSIS — R21 RASH AND OTHER NONSPECIFIC SKIN ERUPTION: ICD-10-CM

## 2022-01-14 PROCEDURE — 99213 OFFICE O/P EST LOW 20 MIN: CPT

## 2022-01-14 NOTE — HISTORY OF PRESENT ILLNESS
[FreeTextEntry1] : rash [de-identified] : rash under right breast for 4-5 days\par reviewed current meds

## 2022-01-14 NOTE — PHYSICAL EXAM
[Normal] : normal rate, regular rhythm, normal S1 and S2 and no murmur heard [de-identified] : rash under right breast c/w tineal rash--no vesicles

## 2022-01-21 ENCOUNTER — RX RENEWAL (OUTPATIENT)
Age: 77
End: 2022-01-21

## 2022-01-25 ENCOUNTER — APPOINTMENT (OUTPATIENT)
Dept: ULTRASOUND IMAGING | Facility: CLINIC | Age: 77
End: 2022-01-25
Payer: MEDICARE

## 2022-01-25 ENCOUNTER — RESULT REVIEW (OUTPATIENT)
Age: 77
End: 2022-01-25

## 2022-01-25 ENCOUNTER — APPOINTMENT (OUTPATIENT)
Dept: MAMMOGRAPHY | Facility: CLINIC | Age: 77
End: 2022-01-25
Payer: MEDICARE

## 2022-01-25 PROCEDURE — 77063 BREAST TOMOSYNTHESIS BI: CPT

## 2022-01-25 PROCEDURE — 77067 SCR MAMMO BI INCL CAD: CPT

## 2022-01-25 PROCEDURE — 76641 ULTRASOUND BREAST COMPLETE: CPT | Mod: 50

## 2022-01-31 ENCOUNTER — APPOINTMENT (OUTPATIENT)
Dept: INTERNAL MEDICINE | Facility: CLINIC | Age: 77
End: 2022-01-31
Payer: MEDICARE

## 2022-01-31 VITALS
OXYGEN SATURATION: 94 % | TEMPERATURE: 98.5 F | RESPIRATION RATE: 16 BRPM | HEART RATE: 96 BPM | HEIGHT: 61 IN | BODY MASS INDEX: 31.34 KG/M2 | WEIGHT: 166 LBS

## 2022-01-31 VITALS — DIASTOLIC BLOOD PRESSURE: 74 MMHG | SYSTOLIC BLOOD PRESSURE: 126 MMHG

## 2022-01-31 PROCEDURE — 99213 OFFICE O/P EST LOW 20 MIN: CPT

## 2022-01-31 NOTE — HISTORY OF PRESENT ILLNESS
[FreeTextEntry1] : stool color change [de-identified] : pt had green stool which resolved without other gi symps\par anxious\par had extensive GI workup prior\par same meds

## 2022-01-31 NOTE — PHYSICAL EXAM
[Normal] : normal rate, regular rhythm, normal S1 and S2 and no murmur heard [de-identified] : anxious

## 2022-02-03 ENCOUNTER — RESULT REVIEW (OUTPATIENT)
Age: 77
End: 2022-02-03

## 2022-02-03 ENCOUNTER — APPOINTMENT (OUTPATIENT)
Dept: MAMMOGRAPHY | Facility: CLINIC | Age: 77
End: 2022-02-03
Payer: MEDICARE

## 2022-02-03 ENCOUNTER — APPOINTMENT (OUTPATIENT)
Dept: ULTRASOUND IMAGING | Facility: CLINIC | Age: 77
End: 2022-02-03
Payer: MEDICARE

## 2022-02-03 PROCEDURE — G0279: CPT | Mod: LT

## 2022-02-03 PROCEDURE — 77065 DX MAMMO INCL CAD UNI: CPT | Mod: LT

## 2022-02-03 PROCEDURE — 76642 ULTRASOUND BREAST LIMITED: CPT | Mod: LT

## 2022-02-08 ENCOUNTER — APPOINTMENT (OUTPATIENT)
Dept: INTERNAL MEDICINE | Facility: CLINIC | Age: 77
End: 2022-02-08

## 2022-02-09 ENCOUNTER — RESULT REVIEW (OUTPATIENT)
Age: 77
End: 2022-02-09

## 2022-02-09 ENCOUNTER — APPOINTMENT (OUTPATIENT)
Dept: ULTRASOUND IMAGING | Facility: CLINIC | Age: 77
End: 2022-02-09
Payer: MEDICARE

## 2022-02-09 ENCOUNTER — OUTPATIENT (OUTPATIENT)
Dept: OUTPATIENT SERVICES | Facility: HOSPITAL | Age: 77
LOS: 1 days | End: 2022-02-09
Payer: MEDICARE

## 2022-02-09 DIAGNOSIS — Z00.00 ENCOUNTER FOR GENERAL ADULT MEDICAL EXAMINATION WITHOUT ABNORMAL FINDINGS: ICD-10-CM

## 2022-02-09 PROCEDURE — 19083 BX BREAST 1ST LESION US IMAG: CPT

## 2022-02-09 PROCEDURE — 19083 BX BREAST 1ST LESION US IMAG: CPT | Mod: LT

## 2022-02-09 PROCEDURE — 88305 TISSUE EXAM BY PATHOLOGIST: CPT

## 2022-02-09 PROCEDURE — 77065 DX MAMMO INCL CAD UNI: CPT

## 2022-02-09 PROCEDURE — 88360 TUMOR IMMUNOHISTOCHEM/MANUAL: CPT

## 2022-02-09 PROCEDURE — 88360 TUMOR IMMUNOHISTOCHEM/MANUAL: CPT | Mod: 26

## 2022-02-09 PROCEDURE — 88305 TISSUE EXAM BY PATHOLOGIST: CPT | Mod: 26

## 2022-02-09 PROCEDURE — A4648: CPT

## 2022-02-09 PROCEDURE — 77065 DX MAMMO INCL CAD UNI: CPT | Mod: 26,LT

## 2022-02-15 ENCOUNTER — APPOINTMENT (OUTPATIENT)
Dept: INTERNAL MEDICINE | Facility: CLINIC | Age: 77
End: 2022-02-15
Payer: MEDICARE

## 2022-02-15 VITALS
BODY MASS INDEX: 30.96 KG/M2 | TEMPERATURE: 98 F | HEIGHT: 61 IN | HEART RATE: 98 BPM | OXYGEN SATURATION: 93 % | WEIGHT: 164 LBS

## 2022-02-15 VITALS — DIASTOLIC BLOOD PRESSURE: 78 MMHG | SYSTOLIC BLOOD PRESSURE: 128 MMHG

## 2022-02-15 PROCEDURE — 99213 OFFICE O/P EST LOW 20 MIN: CPT

## 2022-02-15 NOTE — HISTORY OF PRESENT ILLNESS
[FreeTextEntry1] : f/u breast bx [de-identified] : left breast bx results reviewed with pt--she was prev advised by Dr. Almodovar--radiologist\par baseline meds the same as per pt

## 2022-02-16 ENCOUNTER — APPOINTMENT (OUTPATIENT)
Dept: SURGICAL ONCOLOGY | Facility: CLINIC | Age: 77
End: 2022-02-16
Payer: MEDICARE

## 2022-02-16 VITALS
DIASTOLIC BLOOD PRESSURE: 87 MMHG | RESPIRATION RATE: 16 BRPM | HEART RATE: 93 BPM | TEMPERATURE: 97.6 F | SYSTOLIC BLOOD PRESSURE: 146 MMHG | OXYGEN SATURATION: 95 % | HEIGHT: 61 IN

## 2022-02-16 DIAGNOSIS — R22.1 LOCALIZED SWELLING, MASS AND LUMP, NECK: ICD-10-CM

## 2022-02-16 PROCEDURE — 99205 OFFICE O/P NEW HI 60 MIN: CPT

## 2022-02-16 NOTE — PHYSICAL EXAM
[Normocephalic] : normocephalic [Atraumatic] : atraumatic [EOMI] : extra ocular movement intact [PERRL] : pupils equal, round and reactive to light [Sclera nonicteric] : sclera nonicteric [Supple] : supple [No Supraclavicular Adenopathy] : no supraclavicular adenopathy [No Cervical Adenopathy] : no cervical adenopathy [Examined in the supine and seated position] : examined in the supine and seated position [No dominant masses] : no dominant masses in right breast  [No dominant masses] : no dominant masses left breast [No Nipple Retraction] : no left nipple retraction [No Nipple Discharge] : no left nipple discharge [Breast Mass Right Breast ___cm] : no masses [Breast Mass Left Breast ___cm] : no masses [Breast Nipple Inversion] : nipples not inverted [Breast Nipple Retraction] : nipples not retracted [Breast Nipple Flattening] : nipples not flattened [Breast Nipple Fissures] : nipples not fissured [Breast Abnormal Lactation (Galactorrhea)] : no galactorrhea [Breast Abnormal Secretion Bloody Fluid] : no bloody discharge [Breast Abnormal Secretion Serous Fluid] : no serous discharge [Breast Abnormal Secretion Opalescent Fluid] : no milky discharge [No Axillary Lymphadenopathy] : no left axillary lymphadenopathy [No Edema] : no edema [No Rashes] : no rashes [No Ulceration] : no ulceration [de-identified] : non-labored respirations  [de-identified] : fullness and tenderness to anterior neck, no obvious masses but exam limited by pain/patient [de-identified] : mild ecchymosis inner breast, no masses

## 2022-02-16 NOTE — ASSESSMENT
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 First we discussed her histopathology and biomarkers in terms of prognosis and adjuvant therapy. \par \par We discussed surgical options including mastectomy versus lumpectomy. She understands that she would not get a better outcome or longer survival with the mastectomy, although risk of local recurrence is lower. After breast conserving surgery, she may see asymmetry in size. She understands that radiation therapy and postoperative evaluation by Medical Oncology are integral parts of breast-conserving treatment and these will be addressed postoperatively. If we proceed with mastectomy, there is a still a chance that radiation will be recommended but less likely.\par  \par If we proceed with lumpectomy, she understands that it will be important to obtain clear margins and there is a 5-10% risk of having to go back for additional tissue. With mastectomy, there is still a small amount of breast tissue (probably on the order of 5%) that remains which will continue to require yearly clinical examination.\par  \par The risks of surgery include bleeding, infection, scarring, numbness, possible discrepancy in breast size with lumpectomy/reconstructed breast.  At the time of lumpectomy, a pre-operative localization of the lesion is required.\par  \par An MRI is recommended for further evaluation of disease extent and the possible presence of multicentric or contralateral disease.  She is interested in getting this done\par   \par Endocrine therapy is standard for all patients with hormone receptor positive disease. The omission of sentinel lymph node biopsy in clinically node negative women over 70 years of age treated with endocrine therapy does not result in increased rates of locoregional recurrence and does not impact breast cancer mortality. The patient however, wishes to undergo lymph node staging so we can proceed with that.\par \par Preoperative, intraoperative, and postoperative considerations were reviewed including anesthetic management and what she can expect when she is recovering from surgery. Risks, benefits, alternatives, and various management options were discussed with the patient.\par \par Following our discussion, we have decided to proceed with L lumpectomy with MagSeed localization (1 site) and L SLNB.\par   \par Ms Womack verbalizes her understanding of the procedure and the risks/benefits/complications and alternatives were discussed questions were answered. She knows to call me if she has any additional questions or concerns.\par  \par Plan:\par  - MRI\par  - L lumpectomy with MagSeed localization (1 site) and L SLNB\par  - Med clearance\par

## 2022-02-16 NOTE — CONSULT LETTER
[FreeTextEntry3] : Lexie Yee MD\par Breast Surgeon\par Division of Surgical Oncology\par Department of Surgery\par [address] [place] [address]\[place] [address] \par Tel: [phone]\par Fax: [phone]\par Email: [email or web address]

## 2022-02-16 NOTE — HISTORY OF PRESENT ILLNESS
[FreeTextEntry1] : The patient is a 77 year old female referred for consultation by Dr. Drake Carr for Left breast IDC.\par \par She is not accompanied by anyone today.\par \par She reports regular breast screening but did have a pause due to the pandemic. She also admits that she is being worked up for a memory loss disorder and is unsure of her medical history.\par \par Her most recent imaging:\par \par 1/25/2022 B/L SM (NW) TC 3%, fatty\par  - B/L benign-appearing masses and calcifications\par  - L lower inner middle- to posterior third possible irregular mass -> DM/US\par \par 1/25/2022 B/L US\par  - R neg\par  - L 9:00 N6 6 mm irregular hypoechoic nodule -> DM/US\par  - BR0\par \par 2/3/2022 L DM\par  - L 9:00 middle depth sub-cm spiculated mass with surrounding circumscribed sub-cm masses\par \par 2/3/2022 L US\par  - L 9:00 N6 7 x 4 x 6 mm irregular hypoechoic mass -> Bx\par  - L 8:00 N5 6 mm cyst cluster\par  - no suspicious LNs\par  - BR5\par \par 2/09/2022 L USG-CNB\par  - L 9:00 N6 (wing): IDC, moderately differentiated, focal ADH; ER 70% mod-strong, AL 0, HER2 1+\par \par She is unable to tell me her medical history. From a chart review, she has COPD, depression, HTN, HLD, LBBB, OA, memory loss disorder. Her surgical history includes B/L lumpectomy (pt unable to recall if this is accurate), foot surgery, D&C, cataract surgery\par \par She reports that her parents both had some kind of cancer, but she is unsure what type. Her brother had a tumor i n his cheek removed.\par

## 2022-02-16 NOTE — PAST MEDICAL HISTORY
[Postmenopausal] : The patient is postmenopausal [Menarche Age ____] : age at menarche was [unfilled] [Menopause Age____] : age at menopause was [unfilled] [Total Preg ___] : G[unfilled] [Live Births ___] : P[unfilled]  [Age At Live Birth ___] : Age at live birth: [unfilled] [FreeTextEntry6] : none [FreeTextEntry7] : none [FreeTextEntry8] : none

## 2022-02-21 ENCOUNTER — APPOINTMENT (OUTPATIENT)
Dept: MRI IMAGING | Facility: CLINIC | Age: 77
End: 2022-02-21
Payer: MEDICARE

## 2022-02-21 ENCOUNTER — APPOINTMENT (OUTPATIENT)
Dept: ULTRASOUND IMAGING | Facility: CLINIC | Age: 77
End: 2022-02-21
Payer: MEDICARE

## 2022-02-21 PROCEDURE — 77049 MRI BREAST C-+ W/CAD BI: CPT | Mod: ME

## 2022-02-21 PROCEDURE — A9585: CPT

## 2022-02-21 PROCEDURE — G1004: CPT

## 2022-02-21 PROCEDURE — 76536 US EXAM OF HEAD AND NECK: CPT

## 2022-02-25 ENCOUNTER — APPOINTMENT (OUTPATIENT)
Dept: INTERNAL MEDICINE | Facility: CLINIC | Age: 77
End: 2022-02-25
Payer: MEDICARE

## 2022-02-25 VITALS
HEIGHT: 61 IN | TEMPERATURE: 99.1 F | OXYGEN SATURATION: 95 % | BODY MASS INDEX: 30.78 KG/M2 | HEART RATE: 92 BPM | WEIGHT: 163 LBS

## 2022-02-25 VITALS — DIASTOLIC BLOOD PRESSURE: 76 MMHG | SYSTOLIC BLOOD PRESSURE: 132 MMHG

## 2022-02-25 PROCEDURE — 99214 OFFICE O/P EST MOD 30 MIN: CPT

## 2022-03-03 ENCOUNTER — APPOINTMENT (OUTPATIENT)
Dept: ULTRASOUND IMAGING | Facility: CLINIC | Age: 77
End: 2022-03-03

## 2022-03-03 ENCOUNTER — OUTPATIENT (OUTPATIENT)
Dept: OUTPATIENT SERVICES | Facility: HOSPITAL | Age: 77
LOS: 1 days | End: 2022-03-03

## 2022-03-03 VITALS
WEIGHT: 160.06 LBS | OXYGEN SATURATION: 97 % | SYSTOLIC BLOOD PRESSURE: 140 MMHG | HEART RATE: 69 BPM | TEMPERATURE: 97 F | HEIGHT: 61.5 IN | DIASTOLIC BLOOD PRESSURE: 80 MMHG | RESPIRATION RATE: 16 BRPM

## 2022-03-03 DIAGNOSIS — C50.912 MALIGNANT NEOPLASM OF UNSPECIFIED SITE OF LEFT FEMALE BREAST: ICD-10-CM

## 2022-03-03 DIAGNOSIS — Z98.890 OTHER SPECIFIED POSTPROCEDURAL STATES: Chronic | ICD-10-CM

## 2022-03-03 DIAGNOSIS — R41.3 OTHER AMNESIA: ICD-10-CM

## 2022-03-03 DIAGNOSIS — Z98.51 TUBAL LIGATION STATUS: Chronic | ICD-10-CM

## 2022-03-03 DIAGNOSIS — S92.912A UNSPECIFIED FRACTURE OF LEFT TOE(S), INITIAL ENCOUNTER FOR CLOSED FRACTURE: Chronic | ICD-10-CM

## 2022-03-03 DIAGNOSIS — F41.9 ANXIETY DISORDER, UNSPECIFIED: ICD-10-CM

## 2022-03-03 DIAGNOSIS — Z91.89 OTHER SPECIFIED PERSONAL RISK FACTORS, NOT ELSEWHERE CLASSIFIED: ICD-10-CM

## 2022-03-03 DIAGNOSIS — Z98.49 CATARACT EXTRACTION STATUS, UNSPECIFIED EYE: Chronic | ICD-10-CM

## 2022-03-03 DIAGNOSIS — I10 ESSENTIAL (PRIMARY) HYPERTENSION: ICD-10-CM

## 2022-03-03 LAB
ALBUMIN SERPL ELPH-MCNC: 4.1 G/DL — SIGNIFICANT CHANGE UP (ref 3.3–5)
ALP SERPL-CCNC: 97 U/L — SIGNIFICANT CHANGE UP (ref 40–120)
ALT FLD-CCNC: 20 U/L — SIGNIFICANT CHANGE UP (ref 4–33)
ANION GAP SERPL CALC-SCNC: 12 MMOL/L — SIGNIFICANT CHANGE UP (ref 7–14)
AST SERPL-CCNC: 22 U/L — SIGNIFICANT CHANGE UP (ref 4–32)
BILIRUB SERPL-MCNC: 0.6 MG/DL — SIGNIFICANT CHANGE UP (ref 0.2–1.2)
BUN SERPL-MCNC: 16 MG/DL — SIGNIFICANT CHANGE UP (ref 7–23)
CALCIUM SERPL-MCNC: 9.9 MG/DL — SIGNIFICANT CHANGE UP (ref 8.4–10.5)
CHLORIDE SERPL-SCNC: 105 MMOL/L — SIGNIFICANT CHANGE UP (ref 98–107)
CO2 SERPL-SCNC: 25 MMOL/L — SIGNIFICANT CHANGE UP (ref 22–31)
CREAT SERPL-MCNC: 0.79 MG/DL — SIGNIFICANT CHANGE UP (ref 0.5–1.3)
EGFR: 77 ML/MIN/1.73M2 — SIGNIFICANT CHANGE UP
GLUCOSE SERPL-MCNC: 110 MG/DL — HIGH (ref 70–99)
HCT VFR BLD CALC: 46.1 % — HIGH (ref 34.5–45)
HGB BLD-MCNC: 15.2 G/DL — SIGNIFICANT CHANGE UP (ref 11.5–15.5)
MCHC RBC-ENTMCNC: 28.3 PG — SIGNIFICANT CHANGE UP (ref 27–34)
MCHC RBC-ENTMCNC: 33 GM/DL — SIGNIFICANT CHANGE UP (ref 32–36)
MCV RBC AUTO: 85.7 FL — SIGNIFICANT CHANGE UP (ref 80–100)
NRBC # BLD: 0 /100 WBCS — SIGNIFICANT CHANGE UP
NRBC # FLD: 0 K/UL — SIGNIFICANT CHANGE UP
PLATELET # BLD AUTO: 209 K/UL — SIGNIFICANT CHANGE UP (ref 150–400)
POTASSIUM SERPL-MCNC: 3.8 MMOL/L — SIGNIFICANT CHANGE UP (ref 3.5–5.3)
POTASSIUM SERPL-SCNC: 3.8 MMOL/L — SIGNIFICANT CHANGE UP (ref 3.5–5.3)
PROT SERPL-MCNC: 6.4 G/DL — SIGNIFICANT CHANGE UP (ref 6–8.3)
RBC # BLD: 5.38 M/UL — HIGH (ref 3.8–5.2)
RBC # FLD: 14.5 % — SIGNIFICANT CHANGE UP (ref 10.3–14.5)
SODIUM SERPL-SCNC: 142 MMOL/L — SIGNIFICANT CHANGE UP (ref 135–145)
WBC # BLD: 9.13 K/UL — SIGNIFICANT CHANGE UP (ref 3.8–10.5)
WBC # FLD AUTO: 9.13 K/UL — SIGNIFICANT CHANGE UP (ref 3.8–10.5)

## 2022-03-03 RX ORDER — SODIUM CHLORIDE 9 MG/ML
1000 INJECTION, SOLUTION INTRAVENOUS
Refills: 0 | Status: DISCONTINUED | OUTPATIENT
Start: 2022-03-07 | End: 2022-03-21

## 2022-03-03 NOTE — H&P PST ADULT - HEALTH CARE MAINTENANCE
pt doesn't remember how often she goes to PCP, pt reports, "I think I goes to PCP whenever I needed."

## 2022-03-03 NOTE — H&P PST ADULT - NSICDXFAMILYHX_GEN_ALL_CORE_FT
FAMILY HISTORY:  Father  Still living? No  Family history of bladder cancer, Age at diagnosis: Age Unknown    Sibling  Still living? No  FHx: throat cancer, Age at diagnosis: Age Unknown

## 2022-03-03 NOTE — H&P PST ADULT - NEGATIVE OPHTHALMOLOGIC SYMPTOMS
pt reports that she wear glass for distance/no diplopia/no photophobia/no lacrimation L/no lacrimation R

## 2022-03-03 NOTE — H&P PST ADULT - PROBLEM SELECTOR PLAN 4
Patient instructed to take with a sip of water on the morning of procedure. Patient instructed to take xanax- if needed with a sip of water on the morning of procedure.

## 2022-03-03 NOTE — H&P PST ADULT - NEGATIVE NEUROLOGICAL SYMPTOMS
no transient paralysis/no weakness/no paresthesias/no generalized seizures/no focal seizures/no tremors/no vertigo

## 2022-03-03 NOTE — H&P PST ADULT - OTHER CARE PROVIDERS
patient with memory loss- states doesn't remember name of cardiologist cardiologist- unable to obtain contact information

## 2022-03-03 NOTE — H&P PST ADULT - PROBLEM SELECTOR PLAN 2
Patient instructed to take with a sip of water on the morning of procedure. Patient instructed to take valsartan with a sip of water on the morning of procedure.

## 2022-03-03 NOTE — H&P PST ADULT - PROBLEM SELECTOR PLAN 1
Pt is tentatively scheduled for  Lumpectomy with Magseed localization, mastectomy partial, axillary lymphadenectomy on 03/07/2022.    Pre-op instructions provided. Pt given verbal and written instructions with teach back on chlorhexidine wash and pepcid. Pt verbalized understanding with return demonstration.     Pt strongly advised to follow up with surgeon to discuss COVID testing requirements prior to procedure.

## 2022-03-03 NOTE — H&P PST ADULT - ASSESSMENT
pre op diagnosis of Malignant neoplasm of left female breast for pre op evaluation prior to scheduled surgery- Lumpectomy with Magseed localization, mastectomy partial, axillary lymphadenectomy.

## 2022-03-03 NOTE — H&P PST ADULT - NEUROLOGICAL COMMENTS
pt has memory loss disorder, as per pt and allscripts, pt can remember some of her past health issues. pt states, I don't remember much, has memory issues." pt has memory loss disorder, as per pt and allscripts, pt can only remember some of her past health issues.

## 2022-03-03 NOTE — H&P PST ADULT - NSICDXPASTSURGICALHX_GEN_ALL_CORE_FT
PAST SURGICAL HISTORY:  Fracture of toe of left foot repair    H/O colonoscopy     H/O tubal ligation     S/P cataract surgery

## 2022-03-03 NOTE — H&P PST ADULT - NSICDXPASTMEDICALHX_GEN_ALL_CORE_FT
PAST MEDICAL HISTORY:  Anxiety and depression     COPD (chronic obstructive pulmonary disease) with emphysema     HLD (hyperlipidemia)     Hypertension     LBBB (left bundle branch block)     Memory loss     Osteoarthritis

## 2022-03-03 NOTE — H&P PST ADULT - RS GEN PE MLT RESP DETAILS PC
airway patent/breath sounds equal/good air movement/no rales/no rhonchi airway patent/breath sounds equal/good air movement/no intercostal retractions/no rhonchi/no wheezes

## 2022-03-03 NOTE — H&P PST ADULT - PROBLEM SELECTOR PLAN 3
Patient with memory loss, all pre op  instructions given to patient as well as her friend- Mrs. Nate Moore( as per pt request). Patient with memory loss, all pre op  instructions given to patient as well as her friend- Ms. Nate Moore( as per pt request).   Copy of pre op instructions given to Ms. Sullivan as per pts request Patient with memory loss, all pre op  instructions given to patient as well as her friend- Ms. Nate Moore( as per pt request).   Copy of pre op instructions given to Ms. Sullivan as per pt's request.    Patient is poor historian, advanced age, HTN, HLD, LBBB( history)- requesting medical clearance prior to surgery.

## 2022-03-04 NOTE — HISTORY OF PRESENT ILLNESS
[FreeTextEntry1] : f/u [de-identified] : sched for lumpectomy 3/24--breast surgeon notes reviewed\par had anxiety attack today--stable chronic memory loss episodes\par meds--valsartan/hct, lipitor, remeron, quetiapine, xanax\par pt states she sees the psychiatrist--doesn’t know name--appt next wk\par reviewed neuro w/u with her from 4/21

## 2022-03-04 NOTE — ASSESSMENT
[FreeTextEntry1] : psyc f/u--appears her anxiety is undertreated\par offered labs--she defers\par reassured\par \par ADDENDUM 3/4/22\par PREOP LABS AND ECG REVIEWED\par PT WITH UNDERLYING LBBB ON ECG UNCHANGED. NLM ETT 11/18\par NO MEDICAL CONTRAINDICATIONS TO PROCEDURE

## 2022-03-05 ENCOUNTER — APPOINTMENT (OUTPATIENT)
Dept: MAMMOGRAPHY | Facility: IMAGING CENTER | Age: 77
End: 2022-03-05
Payer: MEDICARE

## 2022-03-05 ENCOUNTER — RESULT REVIEW (OUTPATIENT)
Age: 77
End: 2022-03-05

## 2022-03-05 ENCOUNTER — OUTPATIENT (OUTPATIENT)
Dept: OUTPATIENT SERVICES | Facility: HOSPITAL | Age: 77
LOS: 1 days | End: 2022-03-05
Payer: MEDICARE

## 2022-03-05 DIAGNOSIS — S92.912A UNSPECIFIED FRACTURE OF LEFT TOE(S), INITIAL ENCOUNTER FOR CLOSED FRACTURE: Chronic | ICD-10-CM

## 2022-03-05 DIAGNOSIS — Z98.51 TUBAL LIGATION STATUS: Chronic | ICD-10-CM

## 2022-03-05 DIAGNOSIS — Z98.49 CATARACT EXTRACTION STATUS, UNSPECIFIED EYE: Chronic | ICD-10-CM

## 2022-03-05 DIAGNOSIS — Z98.890 OTHER SPECIFIED POSTPROCEDURAL STATES: Chronic | ICD-10-CM

## 2022-03-05 DIAGNOSIS — Z00.8 ENCOUNTER FOR OTHER GENERAL EXAMINATION: ICD-10-CM

## 2022-03-05 PROBLEM — I10 ESSENTIAL (PRIMARY) HYPERTENSION: Chronic | Status: ACTIVE | Noted: 2022-03-03

## 2022-03-05 PROBLEM — I44.7 LEFT BUNDLE-BRANCH BLOCK, UNSPECIFIED: Chronic | Status: ACTIVE | Noted: 2022-03-03

## 2022-03-05 PROBLEM — F41.9 ANXIETY DISORDER, UNSPECIFIED: Chronic | Status: ACTIVE | Noted: 2022-03-03

## 2022-03-05 PROBLEM — E78.5 HYPERLIPIDEMIA, UNSPECIFIED: Chronic | Status: ACTIVE | Noted: 2022-03-03

## 2022-03-05 PROBLEM — R41.3 OTHER AMNESIA: Chronic | Status: ACTIVE | Noted: 2022-03-03

## 2022-03-05 PROBLEM — M19.90 UNSPECIFIED OSTEOARTHRITIS, UNSPECIFIED SITE: Chronic | Status: ACTIVE | Noted: 2022-03-03

## 2022-03-05 PROBLEM — J43.9 EMPHYSEMA, UNSPECIFIED: Chronic | Status: ACTIVE | Noted: 2022-03-03

## 2022-03-05 PROCEDURE — 19281 PERQ DEVICE BREAST 1ST IMAG: CPT | Mod: LT

## 2022-03-05 PROCEDURE — 19281 PERQ DEVICE BREAST 1ST IMAG: CPT

## 2022-03-05 PROCEDURE — C1739: CPT

## 2022-03-05 NOTE — ASU PATIENT PROFILE, ADULT - FALL HARM RISK - UNIVERSAL INTERVENTIONS
Bed in lowest position, wheels locked, appropriate side rails in place/Call bell, personal items and telephone in reach/Instruct patient to call for assistance before getting out of bed or chair/Non-slip footwear when patient is out of bed/[place] to call system/Physically safe environment - no spills, clutter or unnecessary equipment/Purposeful Proactive Rounding/Room/bathroom lighting operational, light cord in reach

## 2022-03-05 NOTE — ASU PATIENT PROFILE, ADULT - FALL HARM RISK - FALLEN IN PAST
Patient called back, telephone appointment has been rescheduled to 9/13/2021 at 1:30 pm with Dr. Salgado, advised patient time might vary as he will be calling in between patient's.    No

## 2022-03-05 NOTE — ASU PATIENT PROFILE, ADULT - PRESSURE ULCER(S)
Immunizations reviewed. Legacy reviewed. Care Everywhere reviewed. Pre-visit chart review completed.     no

## 2022-03-06 ENCOUNTER — TRANSCRIPTION ENCOUNTER (OUTPATIENT)
Age: 77
End: 2022-03-06

## 2022-03-07 ENCOUNTER — RESULT REVIEW (OUTPATIENT)
Age: 77
End: 2022-03-07

## 2022-03-07 ENCOUNTER — APPOINTMENT (OUTPATIENT)
Dept: ULTRASOUND IMAGING | Facility: HOSPITAL | Age: 77
End: 2022-03-07

## 2022-03-07 ENCOUNTER — OUTPATIENT (OUTPATIENT)
Dept: OUTPATIENT SERVICES | Facility: HOSPITAL | Age: 77
LOS: 1 days | Discharge: HOME | End: 2022-03-07
Payer: MEDICARE

## 2022-03-07 ENCOUNTER — APPOINTMENT (OUTPATIENT)
Dept: SURGICAL ONCOLOGY | Facility: HOSPITAL | Age: 77
End: 2022-03-07

## 2022-03-07 ENCOUNTER — APPOINTMENT (OUTPATIENT)
Dept: NUCLEAR MEDICINE | Facility: HOSPITAL | Age: 77
End: 2022-03-07

## 2022-03-07 VITALS
WEIGHT: 160.06 LBS | RESPIRATION RATE: 17 BRPM | HEART RATE: 98 BPM | SYSTOLIC BLOOD PRESSURE: 114 MMHG | TEMPERATURE: 98 F | DIASTOLIC BLOOD PRESSURE: 61 MMHG | OXYGEN SATURATION: 96 % | HEIGHT: 61 IN

## 2022-03-07 VITALS
OXYGEN SATURATION: 97 % | TEMPERATURE: 98 F | DIASTOLIC BLOOD PRESSURE: 56 MMHG | RESPIRATION RATE: 17 BRPM | HEART RATE: 88 BPM | SYSTOLIC BLOOD PRESSURE: 125 MMHG

## 2022-03-07 DIAGNOSIS — Z98.890 OTHER SPECIFIED POSTPROCEDURAL STATES: Chronic | ICD-10-CM

## 2022-03-07 DIAGNOSIS — C50.912 MALIGNANT NEOPLASM OF UNSPECIFIED SITE OF LEFT FEMALE BREAST: ICD-10-CM

## 2022-03-07 DIAGNOSIS — Z98.49 CATARACT EXTRACTION STATUS, UNSPECIFIED EYE: Chronic | ICD-10-CM

## 2022-03-07 DIAGNOSIS — Z98.51 TUBAL LIGATION STATUS: Chronic | ICD-10-CM

## 2022-03-07 DIAGNOSIS — S92.912A UNSPECIFIED FRACTURE OF LEFT TOE(S), INITIAL ENCOUNTER FOR CLOSED FRACTURE: Chronic | ICD-10-CM

## 2022-03-07 PROCEDURE — 38900 IO MAP OF SENT LYMPH NODE: CPT

## 2022-03-07 PROCEDURE — 88307 TISSUE EXAM BY PATHOLOGIST: CPT | Mod: 26

## 2022-03-07 PROCEDURE — 88305 TISSUE EXAM BY PATHOLOGIST: CPT | Mod: 26

## 2022-03-07 PROCEDURE — 76098 X-RAY EXAM SURGICAL SPECIMEN: CPT | Mod: 26

## 2022-03-07 PROCEDURE — 19301 PARTIAL MASTECTOMY: CPT | Mod: LT

## 2022-03-07 PROCEDURE — 38525 BIOPSY/REMOVAL LYMPH NODES: CPT | Mod: LT

## 2022-03-07 PROCEDURE — 38792 RA TRACER ID OF SENTINL NODE: CPT | Mod: 59

## 2022-03-07 RX ORDER — FENTANYL CITRATE 50 UG/ML
50 INJECTION INTRAVENOUS
Refills: 0 | Status: DISCONTINUED | OUTPATIENT
Start: 2022-03-07 | End: 2022-03-07

## 2022-03-07 RX ORDER — HYDROMORPHONE HYDROCHLORIDE 2 MG/ML
0.5 INJECTION INTRAMUSCULAR; INTRAVENOUS; SUBCUTANEOUS
Refills: 0 | Status: DISCONTINUED | OUTPATIENT
Start: 2022-03-07 | End: 2022-03-07

## 2022-03-07 RX ORDER — ONDANSETRON 8 MG/1
4 TABLET, FILM COATED ORAL ONCE
Refills: 0 | Status: COMPLETED | OUTPATIENT
Start: 2022-03-07 | End: 2022-03-07

## 2022-03-07 RX ORDER — FENTANYL CITRATE 50 UG/ML
25 INJECTION INTRAVENOUS
Refills: 0 | Status: DISCONTINUED | OUTPATIENT
Start: 2022-03-07 | End: 2022-03-07

## 2022-03-07 RX ADMIN — ONDANSETRON 4 MILLIGRAM(S): 8 TABLET, FILM COATED ORAL at 16:25

## 2022-03-07 NOTE — ASU DISCHARGE PLAN (ADULT/PEDIATRIC) - NURSING INSTRUCTIONS
DO NOT take any Tylenol (Acetaminophen) or narcotics containing Tylenol until after 9pm. You received Tylenol during your operation and it can cause damage to your liver if too much is taken within a 24 hour time period.

## 2022-03-07 NOTE — ASU DISCHARGE PLAN (ADULT/PEDIATRIC) - CARE PROVIDER_API CALL
Lexie Yee)  Surgery  [address] [place] [id number]  Phone: [phone]  Fax: [phone]  Follow Up Time: 2 weeks

## 2022-03-07 NOTE — ASU DISCHARGE PLAN (ADULT/PEDIATRIC) - NS MD DC FALL RISK RISK
For information on Fall & Injury Prevention, visit: [email or web address] OR  [email or web address] OR  https://www.cdc.gov/steadi/patient.html

## 2022-03-07 NOTE — BRIEF OPERATIVE NOTE - OPERATION/FINDINGS
- Excision of Left - 9 o'clock - Breast Mass - 6 cm from the nipple using Mag Seed localization   - [place] Lymph Node Biopsy using Dye and Radioactive Tracer

## 2022-03-07 NOTE — BRIEF OPERATIVE NOTE - SPECIMENS
1) Breast Mass 9 o'clock, 6 cmfn; 2) [place] [address] #1 (count > 700) , 3) [address] #2 (count > 700) , 4) [place] #3 (count > 1000)

## 2022-03-11 LAB — SURGICAL PATHOLOGY STUDY: SIGNIFICANT CHANGE UP

## 2022-03-28 ENCOUNTER — APPOINTMENT (OUTPATIENT)
Dept: SURGICAL ONCOLOGY | Facility: CLINIC | Age: 77
End: 2022-03-28
Payer: MEDICARE

## 2022-03-28 VITALS
BODY MASS INDEX: 30.78 KG/M2 | OXYGEN SATURATION: 95 % | TEMPERATURE: 98.3 F | HEART RATE: 111 BPM | RESPIRATION RATE: 17 BRPM | WEIGHT: 163 LBS | HEIGHT: 61 IN | DIASTOLIC BLOOD PRESSURE: 87 MMHG | SYSTOLIC BLOOD PRESSURE: 130 MMHG

## 2022-03-28 PROCEDURE — 99024 POSTOP FOLLOW-UP VISIT: CPT

## 2022-03-28 NOTE — CONSULT LETTER
[Dear  ___] : Dear  [unfilled], [Courtesy Letter:] : I had the pleasure of seeing your patient, [unfilled], in my office today. [Please see my note below.] : Please see my note below. [Consult Closing:] : Thank you very much for allowing me to participate in the care of this patient.  If you have any questions, please do not hesitate to contact me. [Sincerely,] : Sincerely, [FreeTextEntry3] : Lexie Yee MD\par Breast Surgeon\par Division of Surgical Oncology\par Department of Surgery\par [address] [place] [address]\[place] [address] \par Tel: [phone]\par Fax: [phone]\par Email: [email or web address]

## 2022-03-28 NOTE — PHYSICAL EXAM
[Normocephalic] : normocephalic [Atraumatic] : atraumatic [EOMI] : extra ocular movement intact [PERRL] : pupils equal, round and reactive to light [Sclera nonicteric] : sclera nonicteric [Asymmetrical] : asymmetrical [Grade 2] : Ptosis Grade 2 [Breast Nipple Inversion Left] : nipple not inverted [Breast Nipple Retraction Left] : nipple not retracted [Breast Nipple Flattening Left] : nipple not flattened [Breast Nipple Fissures Left] : nipple not fissured [No Axillary Lymphadenopathy] : no left axillary lymphadenopathy [No Edema] : no edema [No Swelling] : no swelling [No Rashes] : no rashes [No Ulceration] : no ulceration [de-identified] : non-labored respirations  [de-identified] : left inner breast incision c/d/i, some induration, no erythema [de-identified] : well-healed incision [de-identified] : some limited ROM of left shoulder

## 2022-03-28 NOTE — HISTORY OF PRESENT ILLNESS
[FreeTextEntry1] : The patient is a 77 year old female referred for consultation by Dr. Drake Carr for Left breast IDC now s/p Left breast lumpectomy, L SLNB on 3/7/22.\par \par She is not accompanied by anyone today.\par \par Prior history:\par She reports regular breast screening but did have a pause due to the pandemic. She also admits that she is being worked up for a memory loss disorder and is unsure of her medical history.\par \par Her most recent imaging:\par \par 1/25/2022 B/L SM (NW) TC 3%, fatty\par  - B/L benign-appearing masses and calcifications\par  - L lower inner middle- to posterior third possible irregular mass -> DM/US\par \par 1/25/2022 B/L US\par  - R neg\par  - L 9:00 N6 6 mm irregular hypoechoic nodule -> DM/US\par  - BR0\par \par 2/3/2022 L DM\par  - L 9:00 middle depth sub-cm spiculated mass with surrounding circumscribed sub-cm masses\par \par 2/3/2022 L US\par  - L 9:00 N6 7 x 4 x 6 mm irregular hypoechoic mass -> Bx\par  - L 8:00 N5 6 mm cyst cluster\par  - no suspicious LNs\par  - BR5\par \par 2/09/2022 L USG-CNB\par  - L 9:00 N6 (wing): IDC, moderately differentiated, focal ADH; ER 70% mod-strong, AL 0, HER2 1+\par \par She is unable to tell me her medical history. From a chart review, she has COPD, depression, HTN, HLD, LBBB, OA, memory loss disorder. Her surgical history includes B/L lumpectomy (pt unable to recall if this is accurate), foot surgery, D&C, cataract surgery\par \par She reports that her parents both had some kind of cancer, but she is unsure what type. Her brother had a tumor in his cheek removed.\par \par 2/21/2022 MRI\par  - R negative\par  - L inner breast biopsy marker 5 mm enhancing mass\par  - Neg LNs\par \par 3/07/2022 L lumpectomy, L SLNB\par  - IDC, G2, 7 mm\par  - DCIS, intermediate grade, at least 1 mm (3/28 slides)\par  - Margins negative\par  - SLNB 0/7\par  - ER 70%, AL 0%, HER2 negative\par  - [id number], AJCC Stage IA\par \par Interval history:\par The patient reports still having some arm pain. Denies numbness, denies swelling. Denies pain in the breast. Denies fevers/chills/redness.\par

## 2022-03-28 NOTE — ASSESSMENT
[FreeTextEntry1] : The patient is a 77 year old female referred for consultation by Dr. Drake Carr for Left breast IDC now s/p Left breast lumpectomy, L SLNB on 3/7/22.\par \par 3/07/2022 L lumpectomy, L SLNB\par  - IDC, G2, 7 mm\par  - DCIS, intermediate grade, at least 1 mm (3/28 slides)\par  - Margins negative\par  - SLNB 0/7\par  - ER 70%, MD 0%, HER2 negative\par  - [id number], AJCC Stage IA\par \par Incision today c/d/i--no evidence of infection. Left arm also no evidence of swelling. Some limitation of movement at the shoulder--reviewed arm stretching exercises today.\par \par Plan:\par  - Oncotype pending\par  - refer to medical oncology\par  - refer to radiation oncology\par  - Next imaging 1/2023 B/L SM\par  - RTO 3 months

## 2022-03-31 ENCOUNTER — OUTPATIENT (OUTPATIENT)
Dept: OUTPATIENT SERVICES | Facility: HOSPITAL | Age: 77
LOS: 1 days | Discharge: HOME | End: 2022-03-31

## 2022-03-31 DIAGNOSIS — S92.912A UNSPECIFIED FRACTURE OF LEFT TOE(S), INITIAL ENCOUNTER FOR CLOSED FRACTURE: Chronic | ICD-10-CM

## 2022-03-31 DIAGNOSIS — Z98.890 OTHER SPECIFIED POSTPROCEDURAL STATES: Chronic | ICD-10-CM

## 2022-03-31 DIAGNOSIS — Z98.49 CATARACT EXTRACTION STATUS, UNSPECIFIED EYE: Chronic | ICD-10-CM

## 2022-03-31 DIAGNOSIS — Z12.39 ENCOUNTER FOR OTHER SCREENING FOR MALIGNANT NEOPLASM OF BREAST: ICD-10-CM

## 2022-03-31 DIAGNOSIS — Z98.51 TUBAL LIGATION STATUS: Chronic | ICD-10-CM

## 2022-04-04 ENCOUNTER — APPOINTMENT (OUTPATIENT)
Dept: HEMATOLOGY ONCOLOGY | Facility: CLINIC | Age: 77
End: 2022-04-04
Payer: MEDICARE

## 2022-04-04 VITALS
SYSTOLIC BLOOD PRESSURE: 107 MMHG | HEIGHT: 60.91 IN | OXYGEN SATURATION: 96 % | BODY MASS INDEX: 30.01 KG/M2 | HEART RATE: 152 BPM | WEIGHT: 158.93 LBS | RESPIRATION RATE: 17 BRPM | TEMPERATURE: 96.6 F | DIASTOLIC BLOOD PRESSURE: 72 MMHG

## 2022-04-04 DIAGNOSIS — M85.80 OTHER SPECIFIED DISORDERS OF BONE DENSITY AND STRUCTURE, UNSPECIFIED SITE: ICD-10-CM

## 2022-04-04 PROCEDURE — 99205 OFFICE O/P NEW HI 60 MIN: CPT

## 2022-04-04 NOTE — ASSESSMENT
[FreeTextEntry1] : In summary, Ms. MARVIN CHISHOLM is a 77 year old postmenopausal female with stage IA (T1b, N0, Mx) ER positive, SC negative, HER-2/susan negative invasive ductal carcinoma of the left breast. She is status post lumpectomy and ALNbx 3/2022. She has h/o severe anxiety and depression.\par \par I discussed the treatment for stage I breast cancer with the patient including the role of chemotherapy, radiation therapy and endocrine therapy. Patient is not a candidate for chemo due to age and medical comorbidities. We briefly discussed chemo and RT but she is not interested. ODX is pending but unlikely to change treatment decision. I will f/u with "[employer]" to get ODX results. \par \par I recommend Arimidex 1 mg daily for five years. I discussed the risks and benefits of aromatase inhibitor therapy including fatigue, coronary artery disease, hyperlipidemia, vaginal dryness, mood changes, hot flashes, GI disturbances, arthralgias, myalgias, and osteoporosis. I will obtain bone density scan to evaluate her bone health prior to starting anastrozole. \par \par Her medical comorbidities are managed well. She will continue to follow with her PCP for general health maintenance including fasting lipid profile every year. She will continue annual mammogram with Dr Yee. I will see her every 3 months to monitor treatment side effects. \par \par The patient had plenty of time to ask questions and all of her questions were answered to her satisfaction. I gave her my office phone number and encouraged her to call with any questions or additional information.\par

## 2022-04-04 NOTE — HISTORY OF PRESENT ILLNESS
[de-identified] : Ms. Olga Womack is a 77 year old female here for an evaluation of breast cancer. Her oncologic history is as follows:\par \par She underwent routine breast imaging on 01/25/2022 (BI-RADS 0)  which showed possible irregular shaped mass with associated distortion lower inner left breast middle to posterior third which likely correlates with an irregular shaped hypoechoic nodule with associated acoustic shadowing left breast 9:00 6 cm from nipple on screening breast ultrasound. for which additional imaging was rec. Additional left breast imaging on 02/03/2022 (BI-RADS 5) showed a 7 mm irregular mass in the left breast at the 9:00 position for which ultrasound-guided core biopsy is advised.\par \par  She underwent  left breast 9 o'clock 6cmfn ultrasound guided biopsy core on 2/9/2022 which showed invasive moderately differentiated ductal carcinoma, [place] score 6/9, measuring 0.5 cm ER +, 70%, PgR Negative,HER-2 Negative. Microcalcifications and focal ADH and  columnar cell change noted.\par \par She underwent a breast MRI on 02/21/2022 (BI-RADS 6) which showed  a 5 mm enhancing mass in the left inner breast, recently diagnosed left breast malignancy.\par No other suspicious enhancing masses. \par \par She underwent left breast lumpectomy: on  3/7/2022 \par 3/07/2022 L lumpectomy, L SLNB\par  - IDC, G2, 7 mm\par  - DCIS, intermediate grade, at least 1 mm (3/28 slides)\par  - Margins negative\par  - SLNB 0/7\par  - ER 70%, NH 0%, HER2 negative\par  - [id number], AJCC Stage IA\par \par

## 2022-04-04 NOTE — PHYSICAL EXAM
[Restricted in physically strenuous activity but ambulatory and able to carry out work of a light or sedentary nature] : Status 1- Restricted in physically strenuous activity but ambulatory and able to carry out work of a light or sedentary nature, e.g., light house work, office work [Normal] : affect appropriate [de-identified] : healing left breast lumpectomy and axillary scar

## 2022-04-04 NOTE — CONSULT LETTER
[Dear  ___] : Dear  [unfilled], [Consult Letter:] : I had the pleasure of evaluating your patient, [unfilled]. [Please see my note below.] : Please see my note below. [Consult Closing:] : Thank you very much for allowing me to participate in the care of this patient.  If you have any questions, please do not hesitate to contact me. [Sincerely,] : Sincerely, [FreeTextEntry3] : Marilyn Jain MD\par Division of Medical Oncology and Hematology\par [facility]\par [employer] [facility]\par [place], [place] [DrBg  ___] : Dr. KO

## 2022-04-11 ENCOUNTER — APPOINTMENT (OUTPATIENT)
Dept: NUCLEAR MEDICINE | Facility: IMAGING CENTER | Age: 77
End: 2022-04-11

## 2022-04-15 ENCOUNTER — OUTPATIENT (OUTPATIENT)
Dept: OUTPATIENT SERVICES | Facility: HOSPITAL | Age: 77
LOS: 1 days | End: 2022-04-15
Payer: MEDICARE

## 2022-04-15 ENCOUNTER — APPOINTMENT (OUTPATIENT)
Dept: RADIOLOGY | Facility: CLINIC | Age: 77
End: 2022-04-15

## 2022-04-15 DIAGNOSIS — Z98.890 OTHER SPECIFIED POSTPROCEDURAL STATES: Chronic | ICD-10-CM

## 2022-04-15 DIAGNOSIS — S92.912A UNSPECIFIED FRACTURE OF LEFT TOE(S), INITIAL ENCOUNTER FOR CLOSED FRACTURE: Chronic | ICD-10-CM

## 2022-04-15 DIAGNOSIS — C50.919 MALIGNANT NEOPLASM OF UNSPECIFIED SITE OF UNSPECIFIED FEMALE BREAST: ICD-10-CM

## 2022-04-15 DIAGNOSIS — Z98.51 TUBAL LIGATION STATUS: Chronic | ICD-10-CM

## 2022-04-15 DIAGNOSIS — Z98.49 CATARACT EXTRACTION STATUS, UNSPECIFIED EYE: Chronic | ICD-10-CM

## 2022-04-15 PROCEDURE — 77080 DXA BONE DENSITY AXIAL: CPT | Mod: 26

## 2022-04-15 PROCEDURE — 77080 DXA BONE DENSITY AXIAL: CPT

## 2022-04-18 ENCOUNTER — RX RENEWAL (OUTPATIENT)
Age: 77
End: 2022-04-18

## 2022-04-25 ENCOUNTER — EMERGENCY (EMERGENCY)
Facility: HOSPITAL | Age: 77
LOS: 1 days | Discharge: HOME | End: 2022-04-25
Attending: EMERGENCY MEDICINE | Admitting: EMERGENCY MEDICINE
Payer: MEDICARE

## 2022-04-25 ENCOUNTER — APPOINTMENT (OUTPATIENT)
Dept: HEMATOLOGY ONCOLOGY | Facility: CLINIC | Age: 77
End: 2022-04-25
Payer: MEDICARE

## 2022-04-25 VITALS
HEIGHT: 61 IN | TEMPERATURE: 99 F | HEART RATE: 89 BPM | SYSTOLIC BLOOD PRESSURE: 145 MMHG | DIASTOLIC BLOOD PRESSURE: 88 MMHG | RESPIRATION RATE: 18 BRPM | OXYGEN SATURATION: 100 %

## 2022-04-25 VITALS
SYSTOLIC BLOOD PRESSURE: 144 MMHG | BODY MASS INDEX: 30.59 KG/M2 | WEIGHT: 162.04 LBS | OXYGEN SATURATION: 96 % | HEART RATE: 94 BPM | RESPIRATION RATE: 17 BRPM | DIASTOLIC BLOOD PRESSURE: 82 MMHG | HEIGHT: 60.91 IN | TEMPERATURE: 97.4 F

## 2022-04-25 DIAGNOSIS — Z98.890 OTHER SPECIFIED POSTPROCEDURAL STATES: Chronic | ICD-10-CM

## 2022-04-25 DIAGNOSIS — Z98.51 TUBAL LIGATION STATUS: Chronic | ICD-10-CM

## 2022-04-25 DIAGNOSIS — Z98.49 CATARACT EXTRACTION STATUS, UNSPECIFIED EYE: Chronic | ICD-10-CM

## 2022-04-25 DIAGNOSIS — S92.912A UNSPECIFIED FRACTURE OF LEFT TOE(S), INITIAL ENCOUNTER FOR CLOSED FRACTURE: Chronic | ICD-10-CM

## 2022-04-25 LAB
ALBUMIN SERPL ELPH-MCNC: 3.6 G/DL — SIGNIFICANT CHANGE UP (ref 3.3–5)
ALP SERPL-CCNC: 87 U/L — SIGNIFICANT CHANGE UP (ref 40–120)
ALT FLD-CCNC: 23 U/L — SIGNIFICANT CHANGE UP (ref 4–33)
ANION GAP SERPL CALC-SCNC: 10 MMOL/L — SIGNIFICANT CHANGE UP (ref 7–14)
APPEARANCE UR: CLEAR — SIGNIFICANT CHANGE UP
AST SERPL-CCNC: 29 U/L — SIGNIFICANT CHANGE UP (ref 4–32)
BASOPHILS # BLD AUTO: 0.03 K/UL — SIGNIFICANT CHANGE UP (ref 0–0.2)
BASOPHILS NFR BLD AUTO: 0.3 % — SIGNIFICANT CHANGE UP (ref 0–2)
BILIRUB SERPL-MCNC: 0.5 MG/DL — SIGNIFICANT CHANGE UP (ref 0.2–1.2)
BILIRUB UR-MCNC: NEGATIVE — SIGNIFICANT CHANGE UP
BUN SERPL-MCNC: 16 MG/DL — SIGNIFICANT CHANGE UP (ref 7–23)
CALCIUM SERPL-MCNC: 9.6 MG/DL — SIGNIFICANT CHANGE UP (ref 8.4–10.5)
CHLORIDE SERPL-SCNC: 107 MMOL/L — SIGNIFICANT CHANGE UP (ref 98–107)
CO2 SERPL-SCNC: 25 MMOL/L — SIGNIFICANT CHANGE UP (ref 22–31)
COLOR SPEC: SIGNIFICANT CHANGE UP
CREAT SERPL-MCNC: 0.8 MG/DL — SIGNIFICANT CHANGE UP (ref 0.5–1.3)
DIFF PNL FLD: NEGATIVE — SIGNIFICANT CHANGE UP
EGFR: 76 ML/MIN/1.73M2 — SIGNIFICANT CHANGE UP
EOSINOPHIL # BLD AUTO: 0.06 K/UL — SIGNIFICANT CHANGE UP (ref 0–0.5)
EOSINOPHIL NFR BLD AUTO: 0.6 % — SIGNIFICANT CHANGE UP (ref 0–6)
GLUCOSE SERPL-MCNC: 103 MG/DL — HIGH (ref 70–99)
GLUCOSE UR QL: NEGATIVE — SIGNIFICANT CHANGE UP
HCT VFR BLD CALC: 40.7 % — SIGNIFICANT CHANGE UP (ref 34.5–45)
HGB BLD-MCNC: 13.5 G/DL — SIGNIFICANT CHANGE UP (ref 11.5–15.5)
IANC: 6.69 K/UL — SIGNIFICANT CHANGE UP (ref 1.8–7.4)
IMM GRANULOCYTES NFR BLD AUTO: 0.2 % — SIGNIFICANT CHANGE UP (ref 0–1.5)
KETONES UR-MCNC: NEGATIVE — SIGNIFICANT CHANGE UP
LEUKOCYTE ESTERASE UR-ACNC: NEGATIVE — SIGNIFICANT CHANGE UP
LYMPHOCYTES # BLD AUTO: 1.97 K/UL — SIGNIFICANT CHANGE UP (ref 1–3.3)
LYMPHOCYTES # BLD AUTO: 21.1 % — SIGNIFICANT CHANGE UP (ref 13–44)
MAGNESIUM SERPL-MCNC: 1.6 MG/DL — SIGNIFICANT CHANGE UP (ref 1.6–2.6)
MCHC RBC-ENTMCNC: 28.8 PG — SIGNIFICANT CHANGE UP (ref 27–34)
MCHC RBC-ENTMCNC: 33.2 GM/DL — SIGNIFICANT CHANGE UP (ref 32–36)
MCV RBC AUTO: 86.8 FL — SIGNIFICANT CHANGE UP (ref 80–100)
MONOCYTES # BLD AUTO: 0.55 K/UL — SIGNIFICANT CHANGE UP (ref 0–0.9)
MONOCYTES NFR BLD AUTO: 5.9 % — SIGNIFICANT CHANGE UP (ref 2–14)
NEUTROPHILS # BLD AUTO: 6.69 K/UL — SIGNIFICANT CHANGE UP (ref 1.8–7.4)
NEUTROPHILS NFR BLD AUTO: 71.9 % — SIGNIFICANT CHANGE UP (ref 43–77)
NITRITE UR-MCNC: NEGATIVE — SIGNIFICANT CHANGE UP
NRBC # BLD: 0 /100 WBCS — SIGNIFICANT CHANGE UP
NRBC # FLD: 0 K/UL — SIGNIFICANT CHANGE UP
PH UR: 6.5 — SIGNIFICANT CHANGE UP (ref 5–8)
PHOSPHATE SERPL-MCNC: 2.7 MG/DL — SIGNIFICANT CHANGE UP (ref 2.5–4.5)
PLATELET # BLD AUTO: 191 K/UL — SIGNIFICANT CHANGE UP (ref 150–400)
POTASSIUM SERPL-MCNC: 3.7 MMOL/L — SIGNIFICANT CHANGE UP (ref 3.5–5.3)
POTASSIUM SERPL-SCNC: 3.7 MMOL/L — SIGNIFICANT CHANGE UP (ref 3.5–5.3)
PROT SERPL-MCNC: 6 G/DL — SIGNIFICANT CHANGE UP (ref 6–8.3)
PROT UR-MCNC: NEGATIVE — SIGNIFICANT CHANGE UP
RBC # BLD: 4.69 M/UL — SIGNIFICANT CHANGE UP (ref 3.8–5.2)
RBC # FLD: 13.3 % — SIGNIFICANT CHANGE UP (ref 10.3–14.5)
SODIUM SERPL-SCNC: 142 MMOL/L — SIGNIFICANT CHANGE UP (ref 135–145)
SP GR SPEC: 1.01 — SIGNIFICANT CHANGE UP (ref 1–1.05)
UROBILINOGEN FLD QL: SIGNIFICANT CHANGE UP
WBC # BLD: 9.32 K/UL — SIGNIFICANT CHANGE UP (ref 3.8–10.5)
WBC # FLD AUTO: 9.32 K/UL — SIGNIFICANT CHANGE UP (ref 3.8–10.5)

## 2022-04-25 PROCEDURE — 99215 OFFICE O/P EST HI 40 MIN: CPT

## 2022-04-25 PROCEDURE — 99284 EMERGENCY DEPT VISIT MOD MDM: CPT

## 2022-04-25 PROCEDURE — 74177 CT ABD & PELVIS W/CONTRAST: CPT | Mod: 26,MA

## 2022-04-25 RX ORDER — ONDANSETRON 8 MG/1
4 TABLET, FILM COATED ORAL ONCE
Refills: 0 | Status: COMPLETED | OUTPATIENT
Start: 2022-04-25 | End: 2022-04-25

## 2022-04-25 RX ADMIN — ONDANSETRON 4 MILLIGRAM(S): 8 TABLET, FILM COATED ORAL at 15:25

## 2022-04-25 NOTE — ED ADULT NURSE NOTE - CHIEF COMPLAINT QUOTE
pt coming from [facility].  pt sent to ED for a "psych eval".  as per EMS, pt was incoherent and had a "panic attack" while at [facility] for her follow up visit.  pt c/o burning on urination.  PMH: breast ca, dementia

## 2022-04-25 NOTE — ED PROVIDER NOTE - NSFOLLOWUPINSTRUCTIONS_ED_ALL_ED_FT
You were seen for anxiety.  This is because you have not been diligent in taking your anti-anxiety medications.    Your work-up in the Emergency Department did not reveal anything acutely concerning.    Follow up with your primary care physician in the next 2-3 days and bring a copy of your results.    Continue taking all your medications as previously prescribed, unless specifically instructed not to do so by a physician.  This is VERY IMPORTANT.    If you have any severe increase in pain, fever, uncontrollable nausea vomiting, inability to tolerate eating and drinking, or any other concerning symptoms, return immediately to the Emergency Department.

## 2022-04-25 NOTE — ED ADULT TRIAGE NOTE - DOMESTIC TRAVEL HIGH RISK QUESTION
Occupational Therapy   Treatment    Name: Mirza Morales  MRN: [id number]  Admitting Diagnosis:  Alcohol withdrawal       Recommendations:     Discharge Recommendations: home  Discharge Equipment Recommendations:  none  Barriers to discharge:  None    Assessment:     Mirza Morales is a 63 y.o. male with a medical diagnosis of Alcohol withdrawal.  He presents with impaired ADL and fxl mobility performance. Pt pleasant and eager to participate in therapy this date with focus on feeding using compensatory strategies, bed mobility, bedroom and bathroom ambulation, toileting, and safe transfer to bedside chair. Pt SBA with bed mobility, min (A) with sit<stand from bed, and CGA during ambulation using RW. Pt using sink and RW to assist self following toileting with CGA. Performance deficits affecting function are weakness, impaired endurance, impaired self care skills, impaired functional mobilty, gait instability, impaired balance, decreased safety awareness, decreased coordination, impaired fine motor. Pt would benefit from continued OT skilled services 3x/wk to improve daily living skills to optimize QOL.  Pt is recommended to discharge to home at this time.      Rehab Prognosis:  Good; patient would benefit from acute skilled OT services to address these deficits and reach maximum level of function.       Plan:     Patient to be seen 3 x/week to address the above listed problems via self-care/home management, therapeutic activities, therapeutic exercises  · Plan of Care Expires: 02/08/20  · Plan of Care Reviewed with: patient    Subjective     Pain/Comfort:  · Pain Rating 1: 0/10    Objective:     Communicated with: RN prior to session.  Patient found HOB elevated with (sitter) upon OT entry to room.    General Precautions: Standard, fall   Orthopedic Precautions:N/A   Braces: N/A     Occupational Performance:     Bed Mobility:    · Patient completed Rolling/Turning to Right with stand by assistance  · Patient  completed Scooting/Bridging with stand by assistance  · Patient completed Supine to Sit with stand by assistance     Functional Mobility/Transfers:  · Patient completed Sit <> Stand Transfer with minimum assistance  with  rolling walker   · Patient completed Bed <> Chair Transfer using Step Transfer technique with contact guard assistance with rolling walker  · Patient completed Toilet Transfer Step Transfer technique with contact guard assistance with  rolling walker  · Functional Mobility: Pt completed bedroom and bathroom ambulation requiring RW and CGA during ambulation. Pt with noted tremors with mobility.     Activities of Daily Living:  · Feeding:  setup (A) for eating meal. pt educated on compensatory strategies (PD-like flicking with shoulder flexion/elbow flexion and finger repeated flexion/ext) in addition to proximal stabilization for distal improvements in FM ability to manipulate spoon when feeding   · Grooming: stand by assistance with pt sitting at toilet   · Lower Body Dressing: minimum assistance donning/doffing pants following toileting   · Toileting: stand by assistance using standard toilet       [facility] 6 Click ADL: 20    Treatment & Education:  Pt educated on role of occupational therapy, POC, and safety during ADLs and functional mobility. Pt and OT discussed importance of safe, continued mobility to optimize daily living skills. Pt verbalized understanding. Pt completed the following during session: feeding, sit>stand t/f, bedroom and bathroom mobility, toileting, LB dressing, safe return to [facility]. See above for associated level of mobility performance. White board updated during session. Pt given instruction to call for medical staff/nurse for assistance.       Patient left up in chair with all lines intact, call button in reach, RN notified and pt's PCT presentEducation:      GOALS:   Multidisciplinary Problems     Occupational Therapy Goals        Problem: Occupational Therapy Goal    Goal  Priority Disciplines Outcome Interventions   Occupational Therapy Goal     OT, PT/OT Ongoing, Progressing    Description:  Goals to be met by: 1/18/2020     Patient will increase functional independence with ADLs by performing:    LE Dressing with Stand-by Assistance in seated position.  Grooming while standing at sink with Stand-by Assistance.   Bathing with Stand-by Assistance.  Toilet transfer to toilet with Stand by Assistance.                         Time Tracking:     OT Date of Treatment: 01/10/20  OT Start Time: 0828  OT Stop Time: 0901  OT Total Time (min): 33 min    Billable Minutes:Self Care/Home Management 33 min    Noni Hitchcock OT  1/10/2020     Dentures No

## 2022-04-25 NOTE — ED ADULT NURSE NOTE - NSIMPLEMENTINTERV_GEN_ALL_ED
Implemented All Fall Risk Interventions:  [place] to call system. Call bell, personal items and telephone within reach. Instruct patient to call for assistance. Room bathroom lighting operational. Non-slip footwear when patient is off stretcher. Physically safe environment: no spills, clutter or unnecessary equipment. Stretcher in lowest position, wheels locked, appropriate side rails in place. Provide visual cue, wrist band, yellow gown, etc. Monitor gait and stability. Monitor for mental status changes and reorient to person, place, and time. Review medications for side effects contributing to fall risk. Reinforce activity limits and safety measures with patient and family.

## 2022-04-25 NOTE — END OF VISIT
Pre op assessment complete, pt's VSS, pt updated on POC. Call light within reach, pt denies any other needs at this time.      [Time Spent: ___ minutes] : I have spent [unfilled] minutes of time on the encounter.

## 2022-04-25 NOTE — ED PROVIDER NOTE - CLINICAL SUMMARY MEDICAL DECISION MAKING FREE TEXT BOX
77F w/ h/o Breast Ca ([facility] center patient), Anxiety, COPD. HLD, HTN, LBBB, Memory loss, OA, recurrent UTIs, who presents from [facility] after a panic attack. Here patient feels anxious, states she had a panic attack but when asked patient says "I don't know." Patient endorses actively not taking her anxiolytic prescription medication as she doesn't want to be on the medication, although does not know the name of the medication. She is c/o burning with urination which began this morning. A&Ox3, Denies fever/chills, CP, SOB, abd pain, N/V, SI/HI. AVSS, abd soft, ND, +mild suprapubic + LLQ tenderness, no rebound tenderness, no guarding. R/o UTI vs Diverticulitis vs colitis. Will get labs, imaging, give meds, and reassess. Ariane att: 77F w/ h/o Breast Ca ([facility] center patient), Anxiety, COPD. HLD, HTN, LBBB, Memory loss, OA, recurrent UTIs, who presents from [facility] after a panic attack. Here patient feels anxious, states she had a panic attack but when asked patient says "I don't know." Patient endorses actively not taking her anxiolytic prescription medication as she doesn't want to be on the medication, although does not know the name of the medication. She is c/o burning with urination which began this morning. A&Ox3, Denies fever/chills, CP, SOB, abd pain, N/V, SI/HI. AVSS, abd soft, ND, +mild suprapubic + LLQ tenderness, no rebound tenderness, no guarding. R/o UTI vs Diverticulitis vs colitis. Will get labs, imaging, give meds, and reassess.

## 2022-04-25 NOTE — ED PROVIDER NOTE - OBJECTIVE STATEMENT
77F w/ h/o Breast Ca ([facility] center patient), Anxiety, COPD. HLD, HTN, LBBB, Memory loss, OA, recurrent UTIs, who presents from [facility] after a panic attack. Here patient feels anxious, states she had a panic attack but when asked patient says "I don't know." Patient endorses actively not taking her anxiolytic prescription medication as she doesn't want to be on the medication, although does not know the name of the medication. She is c/o burning with urination which began this morning. A&Ox3, Denies fever/chills, CP, SOB, abd pain, N/V, SI/HI,

## 2022-04-25 NOTE — ED PROVIDER NOTE - PROGRESS NOTE DETAILS
CHANDRA Mcclellan, Attending: signed out form Ariane. Hx of anxiety/panic. Presentation sounded CHANDRA Mcclellan, Attending: signed out form Ariane. Hx of anxiety/panic. Presentation sounded like anxiety/panic. Spoke to roommate and daughter who confirm pt has been very anxious, has seen PCP and given meds for anxiety (BZD, mirtazapine, quetiapine). Pt resistant to taking it. Doubt acute medical issue and likely OK for d/c with continued PCP/possibly psych care. No indication for emergent psych eval or inpatient psych admission. [place] PGY3 - Pt. feels well.  All w/u unremarkable.  Discussed results w/ pt., who understands them.  Will dc w/ return precautions and PCP f/u.  All other questions and concerns have been addressed.

## 2022-04-25 NOTE — ED ADULT TRIAGE NOTE - CHIEF COMPLAINT QUOTE
pt coming from [facility].  pt sent to ED for a "psych eval".  as per EMS, pt was incoherent and had a "panic attack" while at [facility] for her follow up visit.  pt c/o burning on urination.  PMH: breast ca, dementia

## 2022-04-25 NOTE — ED PROVIDER NOTE - PATIENT PORTAL LINK FT
You can access the FollowMyHealth Patient Portal offered by [facility] by registering at the following website: [email or web address]. By joining [employer]’s FollowMyHealth portal, you will also be able to view your health information using other applications (apps) compatible with our system.

## 2022-04-25 NOTE — ED ADULT NURSE NOTE - OBJECTIVE STATEMENT
pt A&ox3, unable to state proper date, came to ED for AMS. pt states shes having burning while urinating. denies hematuria. pt states she feels chest pressure at times. pt denies H/A, Dizziness, lightheadedness, and radiating chest pain. NSR on telemetry. breathing is spontaneous and unlabored. sating 99% on RA. bilateral pedal and radial pulses palpable and strong. IV placed Labs drawn and sent as per ordered. Bed in lowest position, call bell within reach, all other safety and comfort measures provided. awaiting labs results and further orders.

## 2022-04-25 NOTE — ED PROVIDER NOTE - PHYSICAL EXAMINATION
GENERAL: well appearing in no acute distress, non-toxic appearing  HEAD: normocephalic, atraumatic  HEENT: normal conjunctiva, oral mucosa moist, uvula midline, no tonsilar exudates, no JVD  CARDIAC: regular rate and rhythm, normal S1S2, no appreciable murmurs, 2+ pulses in UE/LE b/l  PULM: normal breath sounds, clear to ascultation bilaterally, no rales, rhonchi, wheezing  GI: Abd soft, nondistended, mild suprapubic+LLQ tenderness, no rebound tenderness, no guarding, no rigidity  : no CVA tenderness b/l, no suprapubic tenderness  NEURO: no focal motor or sensory deficits, CN2-12 intact, normal speech, PERRLA, EOMI, normal gait, AAOx3  MSK: ROM intact, no peripheral edema, no calf tenderness b/l  SKIN: well-perfused, extremities warm, no visible rashes  PSYCH: appropriate mood and affect

## 2022-04-28 ENCOUNTER — APPOINTMENT (OUTPATIENT)
Dept: PSYCHIATRY | Facility: CLINIC | Age: 77
End: 2022-04-28

## 2022-04-28 NOTE — HISTORY OF PRESENT ILLNESS
[de-identified] : Ms. Olga Womack is a 77 year old female here for an evaluation of breast cancer. Her oncologic history is as follows:\par \par She underwent routine breast imaging on 01/25/2022 (BI-RADS 0)  which showed possible irregular shaped mass with associated distortion lower inner left breast middle to posterior third which likely correlates with an irregular shaped hypoechoic nodule with associated acoustic shadowing left breast 9:00 6 cm from nipple on screening breast ultrasound. for which additional imaging was rec. Additional left breast imaging on 02/03/2022 (BI-RADS 5) showed a 7 mm irregular mass in the left breast at the 9:00 position for which ultrasound-guided core biopsy is advised.\par \par  She underwent  left breast 9 o'clock 6cmfn ultrasound guided biopsy core on 2/9/2022 which showed invasive moderately differentiated ductal carcinoma, [place] score 6/9, measuring 0.5 cm ER +, 70%, PgR Negative,HER-2 Negative. Microcalcifications and focal ADH and  columnar cell change noted.\par \par She underwent a breast MRI on 02/21/2022 (BI-RADS 6) which showed  a 5 mm enhancing mass in the left inner breast, recently diagnosed left breast malignancy.\par No other suspicious enhancing masses. \par \par She underwent left breast lumpectomy: on  3/7/2022 \par 3/07/2022 L lumpectomy, L SLNB\par  - IDC, G2, 7 mm\par  - DCIS, intermediate grade, at least 1 mm (3/28 slides)\par  - Margins negative\par  - SLNB 0/7\par  - ER 70%, AK 0%, HER2 negative\par  - [id number], AJCC Stage IA\par \par  [de-identified] : In summary, Ms. MARVIN CHISHOLM is a 77 year old postmenopausal female with stage IA (T1b, N0, Mx) ER positive, NE negative, HER-2/susan negative invasive ductal carcinoma of the left breast. She is status post lumpectomy and ALNbx 3/2022. She has h/o severe anxiety and depression.\par \par Initial consult 4/4/22- AI prescribed\par ODX reported 4/13/22: RS 36\par D/w pt over the phone 4/14/22- rec to come for visit\par \par She is here today 4/25/22 to discuss ODX. \par She doesn’t know why she is here today\par Pt reports she is forgetful and has no recollection of ODX discussion or visit ith me 3 weeks ago\par She is having major anxiety and reports " acting crazy" at home. She lives with friends Dianne and Deidre\par She is here with friend- Shania. Shania reports pt is very forgetful lately. Was here last time with friend Dianne but she doesn’t remember \par She says that she thought she was going to see someone for "acting crazy" \par She is tearful and reports " she cant think straight". She dosent remember the name of her psychiatrist or when she was seen last. \par Denies SI or HI \par After a lot of redirecting, she wanted to discuss cancer treatment option. We discussed CMF chemo but she is not interested. pt needs to be evaluated by psychiatrist first to control anxiety issues and then have better discussion re risks/benefits of chemo. Given her age, comorbidities and uncontrolled anxiety- may not be able to tolerate anything more than AI but will need a discussion about her options when she is able to comprehend risks/benefits. \par \par addendum 4/25: Pt discharged from ER w/o psyche intervention\par addendum 4/26: referred to see Dr Frank silva\par addendum 4/27: Dr Marie rec to see her primary psychiatrist, Dr Mahajan\par addendum 4/28- spoke to pt. She says she is alone and doesn’t know how to place call to make appt. She says Her mind isnt working.\par  My office conference her in with Dr Mahajan office. She declined an earlier appt and wanted to keep appt for next month. \par \par We will schedule office visit in 1-2 weeks to f/u \par Continue AI in the meantine

## 2022-04-28 NOTE — CONSULT LETTER
[Dear  ___] : Dear  [unfilled], [Consult Letter:] : I had the pleasure of evaluating your patient, [unfilled]. [Please see my note below.] : Please see my note below. [Consult Closing:] : Thank you very much for allowing me to participate in the care of this patient.  If you have any questions, please do not hesitate to contact me. [Sincerely,] : Sincerely, [DrBg  ___] : Dr. KO [FreeTextEntry3] : Marilyn Jain MD\par Division of Medical Oncology and Hematology\par [facility]\par [employer] [facility]\par [place], [place]

## 2022-04-28 NOTE — ASSESSMENT
[FreeTextEntry1] : In summary, Ms. MARVIN CHISHOLM is a 77 year old postmenopausal female with stage IA (T1b, N0, Mx) ER positive, HI negative, HER-2/susan negative invasive ductal carcinoma of the left breast. She is status post lumpectomy and ALNbx 3/2022. She has h/o severe anxiety and depression.\par \par Initial consult 4/4/22- AI prescribed\par ODX reported 4/13/22: RS 36\par D/w pt over the phone 4/14/22- rec to come for visit\par \par She is here today 4/25/22 to discuss ODX. \par She doesn’t know why she is here today\par Pt reports she is forgetful and has no recollection of ODX discussion or visit ith me 3 weeks ago\par She is having major anxiety and reports " acting crazy" at home. She lives with friends Dianne and Deidre\par She is here with friend- Shania. Shania reports pt is very forgetful lately. Was here last time with friend Dianne but she doesn’t remember \par She says that she thought she was going to see someone for "acting crazy" \par She is tearful and reports " she cant think straight". She dosent remember the name of her psychiatrist or when she was seen last. \par Denies SI or HI \par After a lot of redirecting, she wanted to discuss cancer treatment option. We discussed CMF chemo but she is not interested. pt needs to be evaluated by psychiatrist first to control anxiety issues and then have better discussion re risks/benefits of chemo. Given her age, comorbidities and uncontrolled anxiety- may not be able to tolerate anything more than AI but will need a discussion about her options when she is able to comprehend risks/benefits. \par \par addendum 4/25: Pt discharged from ER w/o psyche intervention\par addendum 4/26: referred to see Dr Frank silva\par addendum 4/27: Dr Marie rec to see her primary psychiatrist, Dr Mahajan\par addendum 4/28- spoke to pt. She says she is alone and doesn’t know how to place call to make appt. She says Her mind isnt working.\par  My office conference her in with Dr Mahajan office. She declined an earlier appt and wanted to keep appt for next month. \par \par We will schedule office visit in 1-2 weeks to f/u \par Continue AI in the meantime\par \par \par The patient had plenty of time to ask questions and all of her questions were answered to her satisfaction. I gave her my office phone number and encouraged her to call with any questions or additional information.\par

## 2022-04-28 NOTE — PHYSICAL EXAM
[Restricted in physically strenuous activity but ambulatory and able to carry out work of a light or sedentary nature] : Status 1- Restricted in physically strenuous activity but ambulatory and able to carry out work of a light or sedentary nature, e.g., light house work, office work [Normal] : affect appropriate [de-identified] : healing left breast lumpectomy and axillary scar

## 2022-04-30 LAB
CULTURE RESULTS: SIGNIFICANT CHANGE UP
SPECIMEN SOURCE: SIGNIFICANT CHANGE UP

## 2022-05-10 ENCOUNTER — OUTPATIENT (OUTPATIENT)
Dept: OUTPATIENT SERVICES | Facility: HOSPITAL | Age: 77
LOS: 1 days | Discharge: HOME | End: 2022-05-10
Payer: MEDICARE

## 2022-05-10 DIAGNOSIS — Z12.39 ENCOUNTER FOR OTHER SCREENING FOR MALIGNANT NEOPLASM OF BREAST: ICD-10-CM

## 2022-05-10 DIAGNOSIS — Z98.890 OTHER SPECIFIED POSTPROCEDURAL STATES: Chronic | ICD-10-CM

## 2022-05-10 DIAGNOSIS — Z98.51 TUBAL LIGATION STATUS: Chronic | ICD-10-CM

## 2022-05-10 DIAGNOSIS — Z98.49 CATARACT EXTRACTION STATUS, UNSPECIFIED EYE: Chronic | ICD-10-CM

## 2022-05-10 DIAGNOSIS — S92.912A UNSPECIFIED FRACTURE OF LEFT TOE(S), INITIAL ENCOUNTER FOR CLOSED FRACTURE: Chronic | ICD-10-CM

## 2022-05-13 ENCOUNTER — APPOINTMENT (OUTPATIENT)
Dept: HEMATOLOGY ONCOLOGY | Facility: CLINIC | Age: 77
End: 2022-05-13
Payer: MEDICARE

## 2022-05-13 ENCOUNTER — NON-APPOINTMENT (OUTPATIENT)
Age: 77
End: 2022-05-13

## 2022-05-13 VITALS
BODY MASS INDEX: 31.01 KG/M2 | HEIGHT: 60.91 IN | WEIGHT: 164.22 LBS | SYSTOLIC BLOOD PRESSURE: 125 MMHG | HEART RATE: 100 BPM | RESPIRATION RATE: 16 BRPM | OXYGEN SATURATION: 96 % | TEMPERATURE: 97.2 F | DIASTOLIC BLOOD PRESSURE: 87 MMHG

## 2022-05-13 PROCEDURE — 93010 ELECTROCARDIOGRAM REPORT: CPT

## 2022-05-13 PROCEDURE — 99215 OFFICE O/P EST HI 40 MIN: CPT

## 2022-05-13 NOTE — ASSESSMENT
[FreeTextEntry1] : In summary, Ms. MARVIN CHISHOLM is a 77 year old postmenopausal female with stage IA (T1b, N0, Mx) ER positive, AK negative, HER-2/susan negative invasive ductal carcinoma of the left breast. She is status post lumpectomy and ALNbx 3/2022. She has h/o severe anxiety and depression. ODX reported 4/13/22: RS 36\par \par 5/13/22: She is here today with friend Ranjit. They live together and he is well aware of her medical conditions\par She is in better mood today. Says she is here today because she has an appt to discuss cancer treatment. Not tearful or anxious TODAY, able to have a conversation. Ranjit had RT for prostate cancer and is very supportive \par She is f/b psychiatrist for anxiety/depression. f/b Dr Mahajan \par \par We discussed CMF chemo and RT due to high risk ODX\par \par I discussed the expected and unexpected side effects of chemotherapy, including but not limited to hair loss, fatigue, change in taste, mouth sores, nausea, vomiting, diarrhea, infusion reaction, allergic reaction, significant myelosuppression, need for blood transfusion, infection, bleeding, cardiac toxicity, neuropathy, chemical cystitis, kidney injury, nerve pain, muscle pain and detrimental effect on liver, lung and heart function. I also discussed a small but potential risk of development of acute leukemia with the use of cyclophosphamide therapy.\par \par The patient understood all the potential side effects and signed an informed consent after discussing the risks, benefits and alternatives.\par \par Patient is willing to do chemo. Concerned about transportation.  called\par \par Case was presented in the TB last week. Consensus was to give CMF chemo f/b RT\par Will refer for RT after chemo\par  Holding AI while on chemo\par \par The patient had plenty of time to ask questions and all of her questions were answered to her satisfaction. I gave her my office phone number and encouraged her to call with any questions or additional information.\par

## 2022-05-13 NOTE — HISTORY OF PRESENT ILLNESS
[de-identified] : Ms. Olga Womack is a 77 year old female here for an evaluation of breast cancer. Her oncologic history is as follows:\par \par She underwent routine breast imaging on 01/25/2022 (BI-RADS 0)  which showed possible irregular shaped mass with associated distortion lower inner left breast middle to posterior third which likely correlates with an irregular shaped hypoechoic nodule with associated acoustic shadowing left breast 9:00 6 cm from nipple on screening breast ultrasound. for which additional imaging was rec. Additional left breast imaging on 02/03/2022 (BI-RADS 5) showed a 7 mm irregular mass in the left breast at the 9:00 position for which ultrasound-guided core biopsy is advised.\par \par  She underwent  left breast 9 o'clock 6cmfn ultrasound guided biopsy core on 2/9/2022 which showed invasive moderately differentiated ductal carcinoma, [place] score 6/9, measuring 0.5 cm ER +, 70%, PgR Negative,HER-2 Negative. Microcalcifications and focal ADH and  columnar cell change noted.\par \par She underwent a breast MRI on 02/21/2022 (BI-RADS 6) which showed  a 5 mm enhancing mass in the left inner breast, recently diagnosed left breast malignancy.\par No other suspicious enhancing masses. \par \par She underwent left breast lumpectomy: on  3/7/2022 \par 3/07/2022 L lumpectomy, L SLNB\par  - IDC, G2, 7 mm\par  - DCIS, intermediate grade, at least 1 mm (3/28 slides)\par  - Margins negative\par  - SLNB 0/7\par  - ER 70%, NH 0%, HER2 negative\par  - [id number], AJCC Stage IA\par \par Initial consult 4/4/22- AI prescribed\par ODX reported 4/13/22: RS 36\par D/w pt over the phone 4/14/22- rec to come for visit\par \par She is here today 4/25/22 to discuss ODX. \par She doesn’t know why she is here today\par Pt reports she is forgetful and has no recollection of ODX discussion or visit ith me 3 weeks ago\par She is having major anxiety and reports " acting crazy" at home. She lives with friends Dianne and Deidre\par She is here with friend- Shania. Shania reports pt is very forgetful lately. Was here last time with friend Dianne but she doesn’t remember \par She says that she thought she was going to see someone for "acting crazy" \par She is tearful and reports " she cant think straight". She dosent remember the name of her psychiatrist or when she was seen last. \par Denies SI or HI \par After a lot of redirecting, she wanted to discuss cancer treatment option. We discussed CMF chemo but she is not interested. pt needs to be evaluated by psychiatrist first to control anxiety issues and then have better discussion re risks/benefits of chemo. Given her age, comorbidities and uncontrolled anxiety- may not be able to tolerate anything more than AI but will need a discussion about her options when she is able to comprehend risks/benefits. \par \par addendum 4/25: Pt discharged from ER w/o psyche intervention\par addendum 4/26: referred to see Dr Marie urgently\par addendum 4/27: Dr Marie rec to see her primary psychiatrist, Dr Mahajan\par addendum 4/28- spoke to pt. She says she is alone and doesn’t know how to place call to make appt. She says Her mind isnt working.\par  My office conference her in with Dr Mahajan office. She declined an earlier appt and wanted to keep appt for next month. \par \par We will schedule office visit in 1-2 weeks to f/u \par Continue AI in the meantine [de-identified] : In summary, Ms. MARVIN CHISHOLM is a 77 year old postmenopausal female with stage IA (T1b, N0, Mx) ER positive, MO negative, HER-2/susan negative invasive ductal carcinoma of the left breast. She is status post lumpectomy and ALNbx 3/2022. She has h/o severe anxiety and depression.\par \par 5/13/22: She is here today with friend Ranjit. They live together and he is well aware of her medical conditions\par She is in better mood today. Says she is here today because she has an appt to discuss cancer treatment. Not tearful or anxious TODAY, able to have a conversation\par Ranjit had RT for prostate cancer and is very supportive \par She is f/b psychiatrist for anxiety/depression. f/b Dr Mahajan \par \par We discussed CMF chemo and RT due to high risk ODX\par \par I discussed the expected and unexpected side effects of chemotherapy, including but not limited to hair loss, fatigue, change in taste, mouth sores, nausea, vomiting, diarrhea, infusion reaction, allergic reaction, significant myelosuppression, need for blood transfusion, infection, bleeding, cardiac toxicity, neuropathy, chemical cystitis, kidney injury, nerve pain, muscle pain and detrimental effect on liver, lung and heart function. I also discussed a small but potential risk of development of acute leukemia with the use of cyclophosphamide therapy.\par \par The patient understood all the potential side effects and signed an informed consent after discussing the risks, benefits and alternatives.\par \par Patient is willing to do chemo. Concerned about transportation.  called\par  Holding AI while on chemo\par \par Case was presented in the TB last week. Consensus was to give CMF chemo f/b RT\par Will refer for RT after chemo\par

## 2022-05-13 NOTE — CONSULT LETTER
[Dear  ___] : Dear  [unfilled], [Consult Letter:] : I had the pleasure of evaluating your patient, [unfilled]. [Please see my note below.] : Please see my note below. [Consult Closing:] : Thank you very much for allowing me to participate in the care of this patient.  If you have any questions, please do not hesitate to contact me. [Sincerely,] : Sincerely, [DrBg  ___] : Dr. KO [FreeTextEntry3] : Marilyn Jain MD\par Division of Medical Oncology and Hematology\par [facility]\par [employer] [facility]\par [place], [place]

## 2022-05-13 NOTE — PHYSICAL EXAM
[Restricted in physically strenuous activity but ambulatory and able to carry out work of a light or sedentary nature] : Status 1- Restricted in physically strenuous activity but ambulatory and able to carry out work of a light or sedentary nature, e.g., light house work, office work [Normal] : affect appropriate [de-identified] : healing left breast lumpectomy and axillary scar

## 2022-05-16 ENCOUNTER — NON-APPOINTMENT (OUTPATIENT)
Age: 77
End: 2022-05-16

## 2022-05-17 ENCOUNTER — NON-APPOINTMENT (OUTPATIENT)
Age: 77
End: 2022-05-17

## 2022-05-25 PROBLEM — Z98.890 OTHER SPECIFIED POSTPROCEDURAL STATES: Chronic | Status: ACTIVE | Noted: 2022-05-24

## 2022-05-25 PROBLEM — C50.912 MALIGNANT NEOPLASM OF UNSPECIFIED SITE OF LEFT FEMALE BREAST: Chronic | Status: ACTIVE | Noted: 2022-05-24

## 2022-05-26 LAB
25(OH)D3 SERPL-MCNC: 38.4 NG/ML
ALBUMIN SERPL ELPH-MCNC: 4.1 G/DL
ALP BLD-CCNC: 100 U/L
ALT SERPL-CCNC: 28 U/L
ANION GAP SERPL CALC-SCNC: 12 MMOL/L
AST SERPL-CCNC: 22 U/L
BASOPHILS # BLD AUTO: 0.05 K/UL
BASOPHILS NFR BLD AUTO: 0.7 %
BILIRUB SERPL-MCNC: 0.6 MG/DL
BUN SERPL-MCNC: 14 MG/DL
CALCIUM SERPL-MCNC: 9.8 MG/DL
CHLORIDE SERPL-SCNC: 103 MMOL/L
CO2 SERPL-SCNC: 27 MMOL/L
CREAT SERPL-MCNC: 0.86 MG/DL
EGFR: 70 ML/MIN/1.73M2
EOSINOPHIL # BLD AUTO: 0.16 K/UL
EOSINOPHIL NFR BLD AUTO: 2.1 %
GLUCOSE SERPL-MCNC: 110 MG/DL
HAV IGM SER QL: NONREACTIVE
HBV CORE IGM SER QL: NONREACTIVE
HBV SURFACE AG SER QL: NONREACTIVE
HCT VFR BLD CALC: 45.6 %
HCV AB SER QL: NONREACTIVE
HCV S/CO RATIO: 0.08 S/CO
HGB BLD-MCNC: 14.6 G/DL
IMM GRANULOCYTES NFR BLD AUTO: 0.4 %
LYMPHOCYTES # BLD AUTO: 1.8 K/UL
LYMPHOCYTES NFR BLD AUTO: 24.1 %
MAN DIFF?: NORMAL
MCHC RBC-ENTMCNC: 28.3 PG
MCHC RBC-ENTMCNC: 32 GM/DL
MCV RBC AUTO: 88.4 FL
MONOCYTES # BLD AUTO: 0.8 K/UL
MONOCYTES NFR BLD AUTO: 10.7 %
NEUTROPHILS # BLD AUTO: 4.63 K/UL
NEUTROPHILS NFR BLD AUTO: 62 %
PLATELET # BLD AUTO: 211 K/UL
POTASSIUM SERPL-SCNC: 3.8 MMOL/L
PROT SERPL-MCNC: 6.2 G/DL
RBC # BLD: 5.16 M/UL
RBC # FLD: 14 %
SODIUM SERPL-SCNC: 143 MMOL/L
WBC # FLD AUTO: 7.47 K/UL

## 2022-05-27 ENCOUNTER — RESULT REVIEW (OUTPATIENT)
Age: 77
End: 2022-05-27

## 2022-05-27 ENCOUNTER — APPOINTMENT (OUTPATIENT)
Dept: HEMATOLOGY ONCOLOGY | Facility: CLINIC | Age: 77
End: 2022-05-27

## 2022-05-27 ENCOUNTER — NON-APPOINTMENT (OUTPATIENT)
Age: 77
End: 2022-05-27

## 2022-05-27 ENCOUNTER — APPOINTMENT (OUTPATIENT)
Dept: INFUSION THERAPY | Facility: HOSPITAL | Age: 77
End: 2022-05-27

## 2022-05-27 ENCOUNTER — APPOINTMENT (OUTPATIENT)
Dept: HEMATOLOGY ONCOLOGY | Facility: CLINIC | Age: 77
End: 2022-05-27
Payer: MEDICARE

## 2022-05-27 VITALS
HEART RATE: 105 BPM | RESPIRATION RATE: 16 BRPM | HEIGHT: 61.02 IN | BODY MASS INDEX: 30.8 KG/M2 | SYSTOLIC BLOOD PRESSURE: 141 MMHG | OXYGEN SATURATION: 97 % | TEMPERATURE: 97.3 F | WEIGHT: 163.14 LBS | DIASTOLIC BLOOD PRESSURE: 89 MMHG

## 2022-05-27 DIAGNOSIS — T45.1X5A NAUSEA WITH VOMITING, UNSPECIFIED: ICD-10-CM

## 2022-05-27 DIAGNOSIS — R11.2 NAUSEA WITH VOMITING, UNSPECIFIED: ICD-10-CM

## 2022-05-27 DIAGNOSIS — Z51.11 ENCOUNTER FOR ANTINEOPLASTIC CHEMOTHERAPY: ICD-10-CM

## 2022-05-27 DIAGNOSIS — I44.7 LEFT BUNDLE-BRANCH BLOCK, UNSPECIFIED: ICD-10-CM

## 2022-05-27 DIAGNOSIS — Z51.89 ENCOUNTER FOR OTHER SPECIFIED AFTERCARE: ICD-10-CM

## 2022-05-27 DIAGNOSIS — Z79.899 OTHER LONG TERM (CURRENT) DRUG THERAPY: ICD-10-CM

## 2022-05-27 DIAGNOSIS — C50.919 MALIGNANT NEOPLASM OF UNSPECIFIED SITE OF UNSPECIFIED FEMALE BREAST: ICD-10-CM

## 2022-05-27 LAB
ALBUMIN SERPL ELPH-MCNC: 3.9 G/DL — SIGNIFICANT CHANGE UP (ref 3.3–5)
ALP SERPL-CCNC: 89 U/L — SIGNIFICANT CHANGE UP (ref 40–120)
ALT FLD-CCNC: 21 U/L — SIGNIFICANT CHANGE UP (ref 10–45)
ANION GAP SERPL CALC-SCNC: 14 MMOL/L — SIGNIFICANT CHANGE UP (ref 5–17)
APTT BLD: 25 SEC — LOW (ref 27.5–35.5)
AST SERPL-CCNC: 20 U/L — SIGNIFICANT CHANGE UP (ref 10–40)
BASOPHILS # BLD AUTO: 0.06 K/UL — SIGNIFICANT CHANGE UP (ref 0–0.2)
BASOPHILS NFR BLD AUTO: 0.5 % — SIGNIFICANT CHANGE UP (ref 0–2)
BILIRUB SERPL-MCNC: 0.5 MG/DL — SIGNIFICANT CHANGE UP (ref 0.2–1.2)
BUN SERPL-MCNC: 13 MG/DL — SIGNIFICANT CHANGE UP (ref 7–23)
CALCIUM SERPL-MCNC: 10.1 MG/DL — SIGNIFICANT CHANGE UP (ref 8.4–10.5)
CHLORIDE SERPL-SCNC: 102 MMOL/L — SIGNIFICANT CHANGE UP (ref 96–108)
CO2 SERPL-SCNC: 26 MMOL/L — SIGNIFICANT CHANGE UP (ref 22–31)
CREAT SERPL-MCNC: 0.8 MG/DL — SIGNIFICANT CHANGE UP (ref 0.5–1.3)
EGFR: 76 ML/MIN/1.73M2 — SIGNIFICANT CHANGE UP
EOSINOPHIL # BLD AUTO: 0.21 K/UL — SIGNIFICANT CHANGE UP (ref 0–0.5)
EOSINOPHIL NFR BLD AUTO: 1.9 % — SIGNIFICANT CHANGE UP (ref 0–6)
GLUCOSE SERPL-MCNC: 87 MG/DL — SIGNIFICANT CHANGE UP (ref 70–99)
HBV CORE AB SER-ACNC: REACTIVE
HBV SURFACE AG SER-ACNC: SIGNIFICANT CHANGE UP
HCT VFR BLD CALC: 45.7 % — HIGH (ref 34.5–45)
HCV AB S/CO SERPL IA: 0.06 S/CO — SIGNIFICANT CHANGE UP (ref 0–0.99)
HCV AB SERPL-IMP: SIGNIFICANT CHANGE UP
HGB BLD-MCNC: 15.2 G/DL — SIGNIFICANT CHANGE UP (ref 11.5–15.5)
IMM GRANULOCYTES NFR BLD AUTO: 0.6 % — SIGNIFICANT CHANGE UP (ref 0–1.5)
INR BLD: 0.97 RATIO — SIGNIFICANT CHANGE UP (ref 0.88–1.16)
LYMPHOCYTES # BLD AUTO: 3.37 K/UL — HIGH (ref 1–3.3)
LYMPHOCYTES # BLD AUTO: 30 % — SIGNIFICANT CHANGE UP (ref 13–44)
MCHC RBC-ENTMCNC: 28.3 PG — SIGNIFICANT CHANGE UP (ref 27–34)
MCHC RBC-ENTMCNC: 33.3 G/DL — SIGNIFICANT CHANGE UP (ref 32–36)
MCV RBC AUTO: 85.1 FL — SIGNIFICANT CHANGE UP (ref 80–100)
MONOCYTES # BLD AUTO: 0.92 K/UL — HIGH (ref 0–0.9)
MONOCYTES NFR BLD AUTO: 8.2 % — SIGNIFICANT CHANGE UP (ref 2–14)
NEUTROPHILS # BLD AUTO: 6.62 K/UL — SIGNIFICANT CHANGE UP (ref 1.8–7.4)
NEUTROPHILS NFR BLD AUTO: 58.8 % — SIGNIFICANT CHANGE UP (ref 43–77)
NRBC # BLD: 0 /100 WBCS — SIGNIFICANT CHANGE UP (ref 0–0)
PLATELET # BLD AUTO: 226 K/UL — SIGNIFICANT CHANGE UP (ref 150–400)
POTASSIUM SERPL-MCNC: 3.8 MMOL/L — SIGNIFICANT CHANGE UP (ref 3.5–5.3)
POTASSIUM SERPL-SCNC: 3.8 MMOL/L — SIGNIFICANT CHANGE UP (ref 3.5–5.3)
PROT SERPL-MCNC: 5.9 G/DL — LOW (ref 6–8.3)
PROTHROM AB SERPL-ACNC: 11.4 SEC — SIGNIFICANT CHANGE UP (ref 10.5–13.4)
RBC # BLD: 5.37 M/UL — HIGH (ref 3.8–5.2)
RBC # FLD: 13.7 % — SIGNIFICANT CHANGE UP (ref 10.3–14.5)
SODIUM SERPL-SCNC: 142 MMOL/L — SIGNIFICANT CHANGE UP (ref 135–145)
WBC # BLD: 11.25 K/UL — HIGH (ref 3.8–10.5)
WBC # FLD AUTO: 11.25 K/UL — HIGH (ref 3.8–10.5)

## 2022-05-27 PROCEDURE — 99215 OFFICE O/P EST HI 40 MIN: CPT

## 2022-06-03 ENCOUNTER — NON-APPOINTMENT (OUTPATIENT)
Age: 77
End: 2022-06-03

## 2022-06-03 ENCOUNTER — INPATIENT (INPATIENT)
Facility: HOSPITAL | Age: 77
LOS: 8 days | Discharge: HOME | End: 2022-06-12
Attending: STUDENT IN AN ORGANIZED HEALTH CARE EDUCATION/TRAINING PROGRAM | Admitting: STUDENT IN AN ORGANIZED HEALTH CARE EDUCATION/TRAINING PROGRAM
Payer: MEDICARE

## 2022-06-03 ENCOUNTER — OUTPATIENT (OUTPATIENT)
Dept: OUTPATIENT SERVICES | Facility: HOSPITAL | Age: 77
LOS: 1 days | Discharge: HOME | End: 2022-06-03

## 2022-06-03 VITALS
SYSTOLIC BLOOD PRESSURE: 140 MMHG | OXYGEN SATURATION: 100 % | HEART RATE: 98 BPM | DIASTOLIC BLOOD PRESSURE: 77 MMHG | TEMPERATURE: 99 F | RESPIRATION RATE: 17 BRPM | HEIGHT: 61 IN

## 2022-06-03 DIAGNOSIS — R53.1 WEAKNESS: ICD-10-CM

## 2022-06-03 DIAGNOSIS — Z98.51 TUBAL LIGATION STATUS: Chronic | ICD-10-CM

## 2022-06-03 DIAGNOSIS — S92.912A UNSPECIFIED FRACTURE OF LEFT TOE(S), INITIAL ENCOUNTER FOR CLOSED FRACTURE: Chronic | ICD-10-CM

## 2022-06-03 DIAGNOSIS — I10 ESSENTIAL (PRIMARY) HYPERTENSION: ICD-10-CM

## 2022-06-03 DIAGNOSIS — Z29.9 ENCOUNTER FOR PROPHYLACTIC MEASURES, UNSPECIFIED: ICD-10-CM

## 2022-06-03 DIAGNOSIS — C50.919 MALIGNANT NEOPLASM OF UNSPECIFIED SITE OF UNSPECIFIED FEMALE BREAST: ICD-10-CM

## 2022-06-03 DIAGNOSIS — Z12.39 ENCOUNTER FOR OTHER SCREENING FOR MALIGNANT NEOPLASM OF BREAST: ICD-10-CM

## 2022-06-03 DIAGNOSIS — D72.819 DECREASED WHITE BLOOD CELL COUNT, UNSPECIFIED: ICD-10-CM

## 2022-06-03 DIAGNOSIS — H92.01 OTALGIA, RIGHT EAR: ICD-10-CM

## 2022-06-03 DIAGNOSIS — Z98.890 OTHER SPECIFIED POSTPROCEDURAL STATES: Chronic | ICD-10-CM

## 2022-06-03 DIAGNOSIS — E87.6 HYPOKALEMIA: ICD-10-CM

## 2022-06-03 DIAGNOSIS — G93.41 METABOLIC ENCEPHALOPATHY: ICD-10-CM

## 2022-06-03 DIAGNOSIS — Z98.49 CATARACT EXTRACTION STATUS, UNSPECIFIED EYE: Chronic | ICD-10-CM

## 2022-06-03 DIAGNOSIS — F03.90 UNSPECIFIED DEMENTIA WITHOUT BEHAVIORAL DISTURBANCE: ICD-10-CM

## 2022-06-03 LAB
ALBUMIN SERPL ELPH-MCNC: 3.6 G/DL — SIGNIFICANT CHANGE UP (ref 3.3–5)
ALP SERPL-CCNC: 105 U/L — SIGNIFICANT CHANGE UP (ref 40–120)
ALT FLD-CCNC: 55 U/L — HIGH (ref 4–33)
ANION GAP SERPL CALC-SCNC: 12 MMOL/L — SIGNIFICANT CHANGE UP (ref 7–14)
AST SERPL-CCNC: 12 U/L — SIGNIFICANT CHANGE UP (ref 4–32)
BASOPHILS # BLD AUTO: 0.02 K/UL — SIGNIFICANT CHANGE UP (ref 0–0.2)
BASOPHILS NFR BLD AUTO: 0.9 % — SIGNIFICANT CHANGE UP (ref 0–2)
BILIRUB SERPL-MCNC: 1.7 MG/DL — HIGH (ref 0.2–1.2)
BUN SERPL-MCNC: 18 MG/DL — SIGNIFICANT CHANGE UP (ref 7–23)
CALCIUM SERPL-MCNC: 9.4 MG/DL — SIGNIFICANT CHANGE UP (ref 8.4–10.5)
CHLORIDE SERPL-SCNC: 99 MMOL/L — SIGNIFICANT CHANGE UP (ref 98–107)
CO2 SERPL-SCNC: 27 MMOL/L — SIGNIFICANT CHANGE UP (ref 22–31)
CREAT SERPL-MCNC: 0.78 MG/DL — SIGNIFICANT CHANGE UP (ref 0.5–1.3)
EGFR: 78 ML/MIN/1.73M2 — SIGNIFICANT CHANGE UP
EOSINOPHIL # BLD AUTO: 0.19 K/UL — SIGNIFICANT CHANGE UP (ref 0–0.5)
EOSINOPHIL NFR BLD AUTO: 10.5 % — HIGH (ref 0–6)
GLUCOSE SERPL-MCNC: 122 MG/DL — HIGH (ref 70–99)
HCT VFR BLD CALC: 39.8 % — SIGNIFICANT CHANGE UP (ref 34.5–45)
HCT VFR BLD CALC: 40.2 % — SIGNIFICANT CHANGE UP (ref 34.5–45)
HGB BLD-MCNC: 13 G/DL — SIGNIFICANT CHANGE UP (ref 11.5–15.5)
HGB BLD-MCNC: 14 G/DL — SIGNIFICANT CHANGE UP (ref 11.5–15.5)
IANC: 0.17 K/UL — LOW (ref 1.8–7.4)
IANC: 0.3 K/UL — LOW (ref 1.8–7.4)
LYMPHOCYTES # BLD AUTO: 1.1 K/UL — SIGNIFICANT CHANGE UP (ref 1–3.3)
LYMPHOCYTES # BLD AUTO: 59.6 % — HIGH (ref 13–44)
MAGNESIUM SERPL-MCNC: 1.8 MG/DL — SIGNIFICANT CHANGE UP (ref 1.6–2.6)
MCHC RBC-ENTMCNC: 28 PG — SIGNIFICANT CHANGE UP (ref 27–34)
MCHC RBC-ENTMCNC: 28.5 PG — SIGNIFICANT CHANGE UP (ref 27–34)
MCHC RBC-ENTMCNC: 32.7 GM/DL — SIGNIFICANT CHANGE UP (ref 32–36)
MCHC RBC-ENTMCNC: 34.8 GM/DL — SIGNIFICANT CHANGE UP (ref 32–36)
MCV RBC AUTO: 81.9 FL — SIGNIFICANT CHANGE UP (ref 80–100)
MCV RBC AUTO: 85.8 FL — SIGNIFICANT CHANGE UP (ref 80–100)
MONOCYTES # BLD AUTO: 0.15 K/UL — SIGNIFICANT CHANGE UP (ref 0–0.9)
MONOCYTES NFR BLD AUTO: 7.9 % — SIGNIFICANT CHANGE UP (ref 2–14)
NEUTROPHILS # BLD AUTO: 0.29 K/UL — LOW (ref 1.8–7.4)
NEUTROPHILS NFR BLD AUTO: 15.8 % — LOW (ref 43–77)
PHOSPHATE SERPL-MCNC: 3.1 MG/DL — SIGNIFICANT CHANGE UP (ref 2.5–4.5)
PLATELET # BLD AUTO: 70 K/UL — LOW (ref 150–400)
PLATELET # BLD AUTO: 78 K/UL — LOW (ref 150–400)
POTASSIUM SERPL-MCNC: 2.8 MMOL/L — CRITICAL LOW (ref 3.5–5.3)
POTASSIUM SERPL-SCNC: 2.8 MMOL/L — CRITICAL LOW (ref 3.5–5.3)
PROT SERPL-MCNC: 5.1 G/DL — LOW (ref 6–8.3)
RBC # BLD: 4.64 M/UL — SIGNIFICANT CHANGE UP (ref 3.8–5.2)
RBC # BLD: 4.91 M/UL — SIGNIFICANT CHANGE UP (ref 3.8–5.2)
RBC # FLD: 13.5 % — SIGNIFICANT CHANGE UP (ref 10.3–14.5)
RBC # FLD: 13.6 % — SIGNIFICANT CHANGE UP (ref 10.3–14.5)
SARS-COV-2 RNA SPEC QL NAA+PROBE: SIGNIFICANT CHANGE UP
SODIUM SERPL-SCNC: 138 MMOL/L — SIGNIFICANT CHANGE UP (ref 135–145)
WBC # BLD: 1.84 K/UL — LOW (ref 3.8–10.5)
WBC # BLD: 2.1 K/UL — LOW (ref 3.8–10.5)
WBC # FLD AUTO: 1.84 K/UL — LOW (ref 3.8–10.5)
WBC # FLD AUTO: 2.1 K/UL — LOW (ref 3.8–10.5)

## 2022-06-03 PROCEDURE — 71045 X-RAY EXAM CHEST 1 VIEW: CPT | Mod: 26

## 2022-06-03 PROCEDURE — 99223 1ST HOSP IP/OBS HIGH 75: CPT

## 2022-06-03 PROCEDURE — 70450 CT HEAD/BRAIN W/O DYE: CPT | Mod: 26

## 2022-06-03 PROCEDURE — 99285 EMERGENCY DEPT VISIT HI MDM: CPT

## 2022-06-03 RX ORDER — ACETAMINOPHEN 500 MG
650 TABLET ORAL EVERY 6 HOURS
Refills: 0 | Status: DISCONTINUED | OUTPATIENT
Start: 2022-06-03 | End: 2022-06-12

## 2022-06-03 RX ORDER — HEPARIN SODIUM 5000 [USP'U]/ML
5000 INJECTION INTRAVENOUS; SUBCUTANEOUS EVERY 12 HOURS
Refills: 0 | Status: DISCONTINUED | OUTPATIENT
Start: 2022-06-03 | End: 2022-06-12

## 2022-06-03 RX ORDER — LANOLIN ALCOHOL/MO/W.PET/CERES
3 CREAM (GRAM) TOPICAL AT BEDTIME
Refills: 0 | Status: DISCONTINUED | OUTPATIENT
Start: 2022-06-03 | End: 2022-06-12

## 2022-06-03 RX ORDER — ALPRAZOLAM 0.25 MG
0.5 TABLET ORAL DAILY
Refills: 0 | Status: DISCONTINUED | OUTPATIENT
Start: 2022-06-03 | End: 2022-06-05

## 2022-06-03 RX ORDER — MIRTAZAPINE 45 MG/1
45 TABLET, ORALLY DISINTEGRATING ORAL AT BEDTIME
Refills: 0 | Status: DISCONTINUED | OUTPATIENT
Start: 2022-06-03 | End: 2022-06-12

## 2022-06-03 RX ORDER — ATORVASTATIN CALCIUM 80 MG/1
20 TABLET, FILM COATED ORAL AT BEDTIME
Refills: 0 | Status: DISCONTINUED | OUTPATIENT
Start: 2022-06-03 | End: 2022-06-12

## 2022-06-03 RX ORDER — SODIUM CHLORIDE 9 MG/ML
1000 INJECTION, SOLUTION INTRAVENOUS
Refills: 0 | Status: DISCONTINUED | OUTPATIENT
Start: 2022-06-03 | End: 2022-06-09

## 2022-06-03 RX ORDER — POTASSIUM CHLORIDE 20 MEQ
10 PACKET (EA) ORAL
Refills: 0 | Status: COMPLETED | OUTPATIENT
Start: 2022-06-03 | End: 2022-06-03

## 2022-06-03 RX ORDER — MAGNESIUM SULFATE 500 MG/ML
2 VIAL (ML) INJECTION ONCE
Refills: 0 | Status: COMPLETED | OUTPATIENT
Start: 2022-06-03 | End: 2022-06-03

## 2022-06-03 RX ORDER — PANTOPRAZOLE SODIUM 20 MG/1
40 TABLET, DELAYED RELEASE ORAL
Refills: 0 | Status: DISCONTINUED | OUTPATIENT
Start: 2022-06-03 | End: 2022-06-12

## 2022-06-03 RX ADMIN — Medication 100 MILLIEQUIVALENT(S): at 16:06

## 2022-06-03 RX ADMIN — Medication 100 MILLIEQUIVALENT(S): at 18:46

## 2022-06-03 RX ADMIN — Medication 3 MILLIGRAM(S): at 23:43

## 2022-06-03 RX ADMIN — Medication 25 GRAM(S): at 16:06

## 2022-06-03 RX ADMIN — Medication 100 MILLIEQUIVALENT(S): at 17:11

## 2022-06-03 RX ADMIN — SODIUM CHLORIDE 75 MILLILITER(S): 9 INJECTION, SOLUTION INTRAVENOUS at 23:04

## 2022-06-03 NOTE — H&P ADULT - NSHPPHYSICALEXAM_GEN_ALL_CORE
PHYSICAL EXAM:  VITALS: Vital Signs Last 24 Hrs  T(C): 37 (03 Jun 2022 20:50), Max: 37.1 (03 Jun 2022 14:02)  T(F): 98.6 (03 Jun 2022 20:50), Max: 98.7 (03 Jun 2022 14:02)  HR: 82 (03 Jun 2022 20:50) (82 - 98)  BP: 145/73 (03 Jun 2022 20:50) (140/77 - 170/81)  BP(mean): --  RR: 18 (03 Jun 2022 20:50) (16 - 18)  SpO2: 96% (03 Jun 2022 20:50) (96% - 100%)  GENERAL: NAD, well-developed, well-nourished  HEAD:  Atraumatic, Normocephalic, small bump on the back (patient states it is chronic)  EYES: EOMI, PERRL, conjunctiva and sclera clear  ENT: Moist Mucus Membranes present, no ulcers appreciated. + normal tympanic membranes on the R ear, normal L ear and no erythema b/l  NECK: Supple, No JVD  CHEST/LUNG: Clear to auscultation bilaterally; No wheezes, rales or rhonchi, no accessory muscle use  HEART: Regular rate and rhythm; No murmurs, rubs, or gallops, (+)S1, S2  ABDOMEN: Soft, Nontender, Nondistended; Normal Bowel sounds   EXTREMITIES:  2+ Peripheral Pulses, No clubbing, cyanosis, or edema, 5/5 strength in UE and LE b/l  PSYCH: normal mood and affect  NEUROLOGY: AAOx2, non-focal deficits noted, CN in tact  SKIN: No rashes or lesions

## 2022-06-03 NOTE — ED ADULT TRIAGE NOTE - CHIEF COMPLAINT QUOTE
Pt from home by EMS for generalized weakness. Pt recent dx of breast CA s/p left sided lumpectomy 3 weeks ago first chemo treatment last week. Pt denies fevers, nausea, cp.

## 2022-06-03 NOTE — H&P ADULT - PROBLEM SELECTOR PLAN 3
Assessment:  - patient has signs of depression, sadness and forgetfulness as per roommates  - known to have anxiety - she was tearful and upset at bedside  - actively denies suicidal and homicidal ideations    Plan:  - psych consult  - c/w mirtazapine  - consider adding SSRI Assessment:  - patient has signs of depression, sadness and forgetfulness as per roommates  - known to have anxiety - she was tearful and upset at bedside  - actively denies suicidal and homicidal ideations    Plan:  - psych consult  - c/w mirtazapine  - consider adding SSRI  - there is also signs of social issues, patient would fight with her daughter frequently. Will get social work consult

## 2022-06-03 NOTE — H&P ADULT - NSHPREVIEWOFSYSTEMS_GEN_ALL_CORE
REVIEW OF SYSTEMS:    CONSTITUTIONAL: No weakness, fevers or chills  EYES/ENT: No visual changes;  No dysphagia; No sore throat; No rhinorrhea; No sinus pain/pressure  NECK: No pain or stiffness  RESPIRATORY: No cough, wheezing, hemoptysis; No shortness of breath  CARDIOVASCULAR: No chest pain or palpitations; No lower extremity edema  GASTROINTESTINAL: No abdominal or epigastric pain. No nausea, vomiting, or hematemesis; No diarrhea or constipation. No melena or hematochezia.  GENITOURINARY: No dysuria, frequency or hematuria  NEUROLOGICAL: No numbness, + weakness, + R sided face, ear and head pains  MSK: ambulates without assistance  SKIN: No itching, burning, rashes, or lesions   All other review of systems is negative unless indicated above.

## 2022-06-03 NOTE — ED ADULT NURSE NOTE - OBJECTIVE STATEMENT
Patient received in ED room 1. Patient A&O x 2. Patient states "I don't know why I'm here". Pt poor historian-hx memory loss. Patient denies any pain, SOB, dizziness, headache, fever, chills. Patient placed on bedside cardiac monitor. Call bell in reach. Bed in lowest position.

## 2022-06-03 NOTE — ED PROVIDER NOTE - PHYSICAL EXAMINATION
GENERAL: A&Ox2, non-toxic appearing, no acute distress  HEENT: NCAT, EOMI, oral mucosa moist, normal conjunctiva  RESP: CTAB, no respiratory distress, no wheezes/rhonchi/rales, speaking in full sentences  CV: RRR, no murmurs/rubs/gallops  ABDOMEN: soft, non-tender, non-distended, no guarding  MSK: no visible deformities  NEURO: no focal sensory or motor deficits, CN 2-12 grossly intact  SKIN: warm, normal color, well perfused, no rash  PSYCH: anxious affect

## 2022-06-03 NOTE — ED PROVIDER NOTE - ATTENDING CONTRIBUTION TO CARE
Dr. Heredia: 76 yo female with breast cancer, started on chemo 1 week ago, with COPD, HTN, HLD and OA, in ED with generalized fatigue.  Pt initially states that she does not know why she is in the ED, states she feels weak but otherwise no complaints.  Collateral from friend Ranjit is that pt has been laying in bed with no energy and he is concerned.  To me pt denies fever, CP/SOB, N/V/D, abdominal pain or other complaints.  On exam pt overall well appearing, in NAD, heart RRR, lungs CTAB, abd NTND, extremities without swelling, strength 5/5 in all extremities and skin without rash.

## 2022-06-03 NOTE — ED PROVIDER NOTE - NSICDXPASTSURGICALHX_GEN_ALL_CORE_FT
PAST SURGICAL HISTORY:  Fracture of toe of left foot repair    H/O colonoscopy     H/O tubal ligation     History of lumpectomy of left breast     S/P cataract surgery

## 2022-06-03 NOTE — H&P ADULT - HISTORY OF PRESENT ILLNESS
This is a 76 y/o F with pmhx of recently diagnosed breast Ca on chemo first session 5/27/2022, anxiety, COPD, HTN, HLD, presented to the ED for new onset worsening confusion and vague symptoms. The patient is a poor historian, often forgetful, wants me to call ivette for information. However, the patient was complaining of new onset R sided face, ear and head pain that is dull in nature. Reports that it is intermittent and on/off. She initially did not notice this in the past however today noticed it. Denies focal deficits. Denies ear drainage. No photophobia.    I spoke with ivette at the phone number listed above. The patient was sent to the ED before she has been in bed all day today. She has been having intermittent forgetfulness for the last few months. She also has been having dizziness whenever she walks. She would go shopping with the roommate and then on the first aisle in the supermarket she would say that she would feel dizziness and wants to go home. After she received chemotherapy, the confusion, forgetfullness and symptoms of dizziness is worse. The patient would ask the same question multiple times. Of note, the roommate endorsed that she has been more upset and sad lately, saying that the patient misses her . She also has been getting into fights with one of her daughters that lives on the first floor downstairs. They found a "suicide note" in her notebook when she was going to the ED today. She also has been known to have anxiety.    Spoke to patient again, she denies suicidal or homicidal ideations. She also became teary, sad and anxious saying that she feels like that there is something wrong with her and she does not know what. She states she has not been feeling well since chemotherapy and is unable to elaborate further because she does not remember.

## 2022-06-03 NOTE — H&P ADULT - NSHPLABSRESULTS_GEN_ALL_CORE
14.0   1.84  )-----------( 78       ( 03 Jun 2022 14:55 )             40.2       06-03    138  |  99  |  18  ----------------------------<  122<H>  2.8<LL>   |  27  |  0.78    Ca    9.4      03 Jun 2022 14:55  Phos  3.1     06-03  Mg     1.80     06-03    TPro  5.1<L>  /  Alb  3.6  /  TBili  1.7<H>  /  DBili  x   /  AST  12  /  ALT  55<H>  /  AlkPhos  105  06-03

## 2022-06-03 NOTE — ED ADULT NURSE REASSESSMENT NOTE - NS ED NURSE REASSESS COMMENT FT1
Received report from AKANKSHA Gillespie. Pt is baseline mental status. Breathing even and unlabored. NSR on the cardiac monitor. NAD. Gave report to E[facility] 1 RN, pt transported by ED micaela Rea

## 2022-06-03 NOTE — H&P ADULT - PROBLEM SELECTOR PLAN 2
Assessment:  - patient presents for worsening confusion and forgetfulness after chemotherapy  - presentation is vague, unclear picture  - CXR neg for opacities    Plan:  - will do infectious work up - pending procalcitonin level  - pending UA  - pending CT head  - possible depression component? will call psych

## 2022-06-03 NOTE — ED PROVIDER NOTE - OBJECTIVE STATEMENT
77 year old female PMH breast cancer recently started on chemo 5/27, COPD, HTN, HLD, LBBB, OA, presents to ED for generalized weakness. Patient states she is unsure why she is here. Spoke w/ housemate Ranjit who states patient has been laying in bed groaning and had no energy. Atif called Earl and was advised to bring patient to the ED. Patient denies complaints other than generalized malaise. Has been eating normally. No complaints of pain.

## 2022-06-03 NOTE — ED PROVIDER NOTE - CLINICAL SUMMARY MEDICAL DECISION MAKING FREE TEXT BOX
Shon Vega, PGY3: 77 year old female p/w generalized malaise in the setting of recently starting chemo. Patient CAOx2 but otherwise denies complaints. Well appearing but anxious, VSS. Plan for ekg, labs to screen for infectious process, reassess for dispo.

## 2022-06-03 NOTE — ED PROVIDER NOTE - NS ED ROS FT
GENERAL: no fever, no chills, no weight loss, +generalized weakness  EYES: no change in vision, no irritation, no discharge, no redness, no pain  HEENT: no trouble swallowing or speaking, no throat pain, no ear pain  CARDIAC: no chest pain, no palpitations   PULMONARY: no cough, no shortness of breath, no wheezing  GI: no abdominal pain, no nausea, no vomiting, no diarrhea, no constipation, no melena, no hematochezia, no hematemesis  : no changes in urination, no dysuria, no frequency, no hematuria, no discharge  SKIN: no rashes  NEURO: no headache, no numbness, no weakness  MSK: no joint pain, no muscle pain, no back pain, no calf pain

## 2022-06-03 NOTE — H&P ADULT - PROBLEM SELECTOR PLAN 8
- hold BP meds for now  - cause of dizziness could be from orthostatic hypotension - patient taking high doses of BP meds  - will obtain orthostatics

## 2022-06-03 NOTE — H&P ADULT - PROBLEM SELECTOR PROBLEM 3
Hospital Medicine    Follow up for :  COPD exacerbation    Interval History:  Graciela Stephenson reports that she is feeling significantly better this morning.  Denies any new complaints.  Glycemic control has improved with adjustments in insulin regimen.    Current Medications:  Current Facility-Administered Medications   Medication   • senna (SENOKOT) 8.6 mg   • bisacodyl (DULCOLAX) suppository 10 mg   • insulin glargine (LANTUS) injection 25 Units   • amLODIPine (NORVASC) tablet 5 mg   • calcium carbonate-vitamin D (CALTRATE+D) 600-400 MG-UNIT tablet 1 tablet   • predniSONE (DELTASONE) tablet 40 mg   • loperamide (IMODIUM) capsule 2 mg   • tiotropium (SPIRIVA HANDIHALER) capsule for inhaler 18 mcg   • prochlorperazine (COMPAZINE) tablet 10 mg   • furosemide (LASIX) tablet 20 mg   • ergocalciferol (DRISDOL) capsule 50,000 Units   • albuterol (VENTOLIN) nebulizer 2.5 mg   • pregabalin (LYRICA) capsule 100 mg   • aspirin (ECOTRIN) enteric coated tablet 81 mg   • docusate calcium (SURFAK) capsule 240 mg   • guaiFENesin (MUCINEX) ER tablet 1,200 mg   • acetaminophen (TYLENOL) tablet 650 mg   • diphenhydrAMINE-zinc acetate (BENADRYL) 2-0.1 % cream 1 application   • albuterol-ipratropium 2.5 mg/0.5 mg (DUONEB) nebulizer solution 3 mL   • butalbital-APAP-caffeine (FIORICET) -40 MG tablet 1 tablet   • HYDROcodone-acetaminophen (NORCO) 5-325 MG per tablet 1 tablet   • LORazepam (ATIVAN) tablet 0.5 mg   • pantoprazole (PROTONIX) EC tablet 40 mg   • ferrous sulfate (65 mg Fe per 325 mg) tablet 325 mg   • losartan (COZAAR) tablet 25 mg   • diphenhydrAMINE (BENADRYL) capsule 25 mg   • potassium CHLORIDE (KLOR-CON M) nash ER tablet 20 mEq   • potassium CHLORIDE (KLOR-CON) packet 20 mEq   • potassium CHLORIDE 20 mEq/100mL IVPB premix   • potassium CHLORIDE (KLOR-CON M) nash ER tablet 40 mEq   • potassium CHLORIDE (KLOR-CON) packet 40 mEq   • potassium CHLORIDE 20 mEq/100mL IVPB premix   • magnesium sulfate 1 g in dextrose 5%  100 mL IVPB premix   • magnesium sulfate 2 g in 50 mL premix IVPB   • magnesium sulfate 2 g in 50 mL premix IVPB   • sodium chloride 0.9 % flush bag 500 mL   • sodium chloride (PF) 0.9 % injection 2 mL   • sodium chloride (PF) 0.9 % injection 2 mL   • sodium chloride 0.9% infusion   • sodium chloride 0.9% infusion   • azithromycin (ZITHROMAX) 500 mg in sodium chloride 0.9 % 250 mL IVPB   • cefTRIAXone (ROCEPHIN) 1,000 mg in sodium chloride 0.9 % 100 mL IVPB   • insulin lispro (HumaLOG) sliding scale injection   • dextrose 50 % injection 25 g   • dextrose 5 % infusion   • glucagon (GLUCAGEN) injection 1 mg   • dextrose (GLUTOSE) 40 % gel 15 g   • venlafaxine XR (EFFEXOR XR) 24 hr capsule 150 mg   • rivaroxaban (XARELTO) tablet 15 mg       Examination:  General: In NAD. Conversant. Awake, Alert, Oriented to time, place, person.  Vital Signs:   Visit Vitals  /60 (BP Location: [facility], Patient Position: [facility])   Pulse 94   Temp 97.9 °F (36.6 °C) (Oral)   Resp 18   Ht 5' 2\" (1.575 m)   Wt 120.3 kg   SpO2 95%   BMI 48.51 kg/m²     Skin: Moist and warm, no rashes  Neck: Supple, symmetric. No JVD, No carotid bruit. Normal carotid upstroke and amplitude.   Respiratory: CTA B/L, wheezing has completely resolved. Good effort.  Cardiovascular: S1, S2. RRR. 2+ pedal pulses  Abdomen: Soft, nontender, NRT, No HSM  Extremities:  Bilateral lower extremities edema, present on admission. No acrocyanosis.   Neurologic: CN II-XII are grossly intact. Full strength in upper and lower extremities B/L      Intake/Output Summary (Last 24 hours) at 6/15/2019 1520  Last data filed at 6/15/2019 1414  Gross per 24 hour   Intake 940 ml   Output 775 ml   Net 165 ml       Glucose (mg/dL)   Date Value   06/14/2019 355 (H)   06/13/2019 159 (H)   04/08/2019 262 (H)       WBC (K/mcL)   Date Value   06/14/2019 9.6   06/13/2019 10.0   04/08/2019 11.2 (H)     HGB (g/dL)   Date Value   06/14/2019 12.9   06/13/2019 14.0   04/08/2019 15.2    Sodium  (mmol/L)   Date Value   06/14/2019 136   06/13/2019 137   04/08/2019 138    BUN (mg/dL)   Date Value   06/14/2019 25 (H)   06/13/2019 23 (H)   04/08/2019 19      PLT (K/mcL)   Date Value   06/14/2019 218   06/13/2019 232   04/08/2019 232   10/07/2013 286   10/06/2013 302   06/14/2013 297     Potassium (mmol/L)   Date Value   06/14/2019 4.6   06/13/2019 4.3   04/08/2019 4.3    Creatinine (mg/dL)   Date Value   06/14/2019 0.87   06/13/2019 0.99 (H)   04/08/2019 0.83        TROPONIN I (ng/mL)   Date Value   06/13/2019 <0.02   04/08/2019 <0.02   03/17/2013 <0.02      No results found for: CPK   No results found for: MMB  INR (no units)   Date Value   06/13/2019 1.0       Assessment:  1. Acute COPD exacerbation. Will treat with steroids, aerosols and antibiotics, considering severity of patient's symptoms.  Will transition from IV to oral steroids.     2. Type 2 diabetes mellitus managed with Levemir and sliding scale insulin. Glycohemoglobin this admission is 7.6.  Will increase insulin glargine given exposure to steroids and uncontrolled hyperglycemia and continue insulin sliding scale.     3. Class 3 obesity.     4. Patient is legally blind.     5. Dyslipidemia.  Will continue statin.     6. Stage II CKD.  Mild acute kidney injury likely related to intravascular volume loss.  Will hydrate and re-evaluate.       7. Gastroesophageal reflux disease.  Appears to be stable at this time.     8. History of DVT on chronic anticoagulation with rivaroxaban.     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

## 2022-06-03 NOTE — H&P ADULT - PROBLEM SELECTOR PLAN 1
Assessment:  - the patient complains of R sided ear pain extending to the R side of the face and head  - no neurological deficits noted at this time  - Ear exam wnl    Plan:  - unclear etiology at this time  - will obtain CT head to r/o acute pathology - r/o metastasis from breast Ca, mastoiditis?  - neurochecks Q8hr  - tylenol prn

## 2022-06-03 NOTE — ED PROVIDER NOTE - NSICDXPASTMEDICALHX_GEN_ALL_CORE_FT
PAST MEDICAL HISTORY:  Anxiety and depression     Breast cancer, left     COPD (chronic obstructive pulmonary disease) with emphysema     History of lumpectomy of left breast     HLD (hyperlipidemia)     Hypertension     LBBB (left bundle branch block)     Memory loss     Osteoarthritis

## 2022-06-03 NOTE — H&P ADULT - PROBLEM SELECTOR PLAN 5
Assessment:  - patient presented with new onset leukopenia after chemotherapy  - WBC 1.84, but lymphocytes and eosinophils elevated    Plan:  - will send RVP  - trend CBC  - infectious work up as above

## 2022-06-03 NOTE — H&P ADULT - ASSESSMENT
78 y/o F with pmhx of recently diagnosed breast Ca on chemo first session 5/27/2022, anxiety, COPD, HTN, HLD, presented to the ED for new onset worsening confusion and vague symptoms. Labs show leukopenia and hypokalemia but otherwise wnl. Admit for work up of confusion and weakness.

## 2022-06-04 DIAGNOSIS — R41.82 ALTERED MENTAL STATUS, UNSPECIFIED: ICD-10-CM

## 2022-06-04 LAB
ALBUMIN SERPL ELPH-MCNC: 3.2 G/DL — LOW (ref 3.3–5)
ALP SERPL-CCNC: 87 U/L — SIGNIFICANT CHANGE UP (ref 40–120)
ALT FLD-CCNC: 48 U/L — HIGH (ref 4–33)
ANION GAP SERPL CALC-SCNC: 12 MMOL/L — SIGNIFICANT CHANGE UP (ref 7–14)
AST SERPL-CCNC: 9 U/L — SIGNIFICANT CHANGE UP (ref 4–32)
B PERT DNA SPEC QL NAA+PROBE: SIGNIFICANT CHANGE UP
B PERT+PARAPERT DNA PNL SPEC NAA+PROBE: SIGNIFICANT CHANGE UP
BASOPHILS # BLD AUTO: 0 K/UL — SIGNIFICANT CHANGE UP (ref 0–0.2)
BASOPHILS NFR BLD AUTO: 0 % — SIGNIFICANT CHANGE UP (ref 0–2)
BILIRUB SERPL-MCNC: 1.3 MG/DL — HIGH (ref 0.2–1.2)
BORDETELLA PARAPERTUSSIS (RAPRVP): SIGNIFICANT CHANGE UP
BUN SERPL-MCNC: 14 MG/DL — SIGNIFICANT CHANGE UP (ref 7–23)
C PNEUM DNA SPEC QL NAA+PROBE: SIGNIFICANT CHANGE UP
CALCIUM SERPL-MCNC: 8.7 MG/DL — SIGNIFICANT CHANGE UP (ref 8.4–10.5)
CHLORIDE SERPL-SCNC: 101 MMOL/L — SIGNIFICANT CHANGE UP (ref 98–107)
CO2 SERPL-SCNC: 25 MMOL/L — SIGNIFICANT CHANGE UP (ref 22–31)
CREAT SERPL-MCNC: 0.73 MG/DL — SIGNIFICANT CHANGE UP (ref 0.5–1.3)
EGFR: 85 ML/MIN/1.73M2 — SIGNIFICANT CHANGE UP
EOSINOPHIL # BLD AUTO: 0.25 K/UL — SIGNIFICANT CHANGE UP (ref 0–0.5)
EOSINOPHIL NFR BLD AUTO: 11.9 % — HIGH (ref 0–6)
FLUAV SUBTYP SPEC NAA+PROBE: SIGNIFICANT CHANGE UP
FLUBV RNA SPEC QL NAA+PROBE: SIGNIFICANT CHANGE UP
GIANT PLATELETS BLD QL SMEAR: PRESENT — SIGNIFICANT CHANGE UP
GLUCOSE SERPL-MCNC: 98 MG/DL — SIGNIFICANT CHANGE UP (ref 70–99)
HADV DNA SPEC QL NAA+PROBE: SIGNIFICANT CHANGE UP
HCOV 229E RNA SPEC QL NAA+PROBE: SIGNIFICANT CHANGE UP
HCOV HKU1 RNA SPEC QL NAA+PROBE: SIGNIFICANT CHANGE UP
HCOV NL63 RNA SPEC QL NAA+PROBE: SIGNIFICANT CHANGE UP
HCOV OC43 RNA SPEC QL NAA+PROBE: SIGNIFICANT CHANGE UP
HMPV RNA SPEC QL NAA+PROBE: SIGNIFICANT CHANGE UP
HPIV1 RNA SPEC QL NAA+PROBE: SIGNIFICANT CHANGE UP
HPIV2 RNA SPEC QL NAA+PROBE: SIGNIFICANT CHANGE UP
HPIV3 RNA SPEC QL NAA+PROBE: SIGNIFICANT CHANGE UP
HPIV4 RNA SPEC QL NAA+PROBE: SIGNIFICANT CHANGE UP
LYMPHOCYTES # BLD AUTO: 1.17 K/UL — SIGNIFICANT CHANGE UP (ref 1–3.3)
LYMPHOCYTES # BLD AUTO: 55.9 % — HIGH (ref 13–44)
M PNEUMO DNA SPEC QL NAA+PROBE: SIGNIFICANT CHANGE UP
MAGNESIUM SERPL-MCNC: 2 MG/DL — SIGNIFICANT CHANGE UP (ref 1.6–2.6)
MANUAL SMEAR VERIFICATION: SIGNIFICANT CHANGE UP
MONOCYTES # BLD AUTO: 0.35 K/UL — SIGNIFICANT CHANGE UP (ref 0–0.9)
MONOCYTES NFR BLD AUTO: 16.9 % — HIGH (ref 2–14)
NEUTROPHILS # BLD AUTO: 0.18 K/UL — LOW (ref 1.8–7.4)
NEUTROPHILS NFR BLD AUTO: 6.8 % — LOW (ref 43–77)
NEUTS BAND # BLD: 1.7 % — SIGNIFICANT CHANGE UP (ref 0–6)
PHOSPHATE SERPL-MCNC: 2.8 MG/DL — SIGNIFICANT CHANGE UP (ref 2.5–4.5)
PLAT MORPH BLD: NORMAL — SIGNIFICANT CHANGE UP
PLATELET COUNT - ESTIMATE: ABNORMAL
POTASSIUM SERPL-MCNC: 3.1 MMOL/L — LOW (ref 3.5–5.3)
POTASSIUM SERPL-SCNC: 3.1 MMOL/L — LOW (ref 3.5–5.3)
PROCALCITONIN SERPL-MCNC: 0.09 NG/ML — SIGNIFICANT CHANGE UP (ref 0.02–0.1)
PROT SERPL-MCNC: 5 G/DL — LOW (ref 6–8.3)
RAPID RVP RESULT: SIGNIFICANT CHANGE UP
RBC BLD AUTO: NORMAL — SIGNIFICANT CHANGE UP
RSV RNA SPEC QL NAA+PROBE: SIGNIFICANT CHANGE UP
RV+EV RNA SPEC QL NAA+PROBE: SIGNIFICANT CHANGE UP
SARS-COV-2 RNA SPEC QL NAA+PROBE: SIGNIFICANT CHANGE UP
SMUDGE CELLS # BLD: PRESENT — SIGNIFICANT CHANGE UP
SODIUM SERPL-SCNC: 138 MMOL/L — SIGNIFICANT CHANGE UP (ref 135–145)
VARIANT LYMPHS # BLD: 6.8 % — HIGH (ref 0–6)

## 2022-06-04 PROCEDURE — 99223 1ST HOSP IP/OBS HIGH 75: CPT | Mod: GC

## 2022-06-04 PROCEDURE — 99233 SBSQ HOSP IP/OBS HIGH 50: CPT

## 2022-06-04 PROCEDURE — 90792 PSYCH DIAG EVAL W/MED SRVCS: CPT

## 2022-06-04 RX ORDER — VALSARTAN 80 MG/1
160 TABLET ORAL DAILY
Refills: 0 | Status: DISCONTINUED | OUTPATIENT
Start: 2022-06-04 | End: 2022-06-04

## 2022-06-04 RX ORDER — LANOLIN ALCOHOL/MO/W.PET/CERES
3 CREAM (GRAM) TOPICAL ONCE
Refills: 0 | Status: COMPLETED | OUTPATIENT
Start: 2022-06-04 | End: 2022-06-04

## 2022-06-04 RX ORDER — POTASSIUM CHLORIDE 20 MEQ
40 PACKET (EA) ORAL ONCE
Refills: 0 | Status: COMPLETED | OUTPATIENT
Start: 2022-06-04 | End: 2022-06-04

## 2022-06-04 RX ORDER — VALSARTAN 80 MG/1
160 TABLET ORAL DAILY
Refills: 0 | Status: DISCONTINUED | OUTPATIENT
Start: 2022-06-04 | End: 2022-06-12

## 2022-06-04 RX ADMIN — PANTOPRAZOLE SODIUM 40 MILLIGRAM(S): 20 TABLET, DELAYED RELEASE ORAL at 05:11

## 2022-06-04 RX ADMIN — Medication 650 MILLIGRAM(S): at 16:39

## 2022-06-04 RX ADMIN — MIRTAZAPINE 45 MILLIGRAM(S): 45 TABLET, ORALLY DISINTEGRATING ORAL at 22:31

## 2022-06-04 RX ADMIN — SODIUM CHLORIDE 75 MILLILITER(S): 9 INJECTION, SOLUTION INTRAVENOUS at 22:31

## 2022-06-04 RX ADMIN — Medication 650 MILLIGRAM(S): at 02:39

## 2022-06-04 RX ADMIN — HEPARIN SODIUM 5000 UNIT(S): 5000 INJECTION INTRAVENOUS; SUBCUTANEOUS at 16:52

## 2022-06-04 RX ADMIN — Medication 650 MILLIGRAM(S): at 17:39

## 2022-06-04 RX ADMIN — SODIUM CHLORIDE 75 MILLILITER(S): 9 INJECTION, SOLUTION INTRAVENOUS at 05:15

## 2022-06-04 RX ADMIN — Medication 0.5 MILLIGRAM(S): at 07:33

## 2022-06-04 RX ADMIN — Medication 3 MILLIGRAM(S): at 02:44

## 2022-06-04 RX ADMIN — Medication 650 MILLIGRAM(S): at 03:37

## 2022-06-04 RX ADMIN — ATORVASTATIN CALCIUM 20 MILLIGRAM(S): 80 TABLET, FILM COATED ORAL at 22:30

## 2022-06-04 RX ADMIN — SODIUM CHLORIDE 75 MILLILITER(S): 9 INJECTION, SOLUTION INTRAVENOUS at 07:33

## 2022-06-04 RX ADMIN — Medication 40 MILLIEQUIVALENT(S): at 05:09

## 2022-06-04 RX ADMIN — HEPARIN SODIUM 5000 UNIT(S): 5000 INJECTION INTRAVENOUS; SUBCUTANEOUS at 05:11

## 2022-06-04 RX ADMIN — VALSARTAN 160 MILLIGRAM(S): 80 TABLET ORAL at 16:52

## 2022-06-04 RX ADMIN — SODIUM CHLORIDE 75 MILLILITER(S): 9 INJECTION, SOLUTION INTRAVENOUS at 16:42

## 2022-06-04 RX ADMIN — SODIUM CHLORIDE 75 MILLILITER(S): 9 INJECTION, SOLUTION INTRAVENOUS at 16:52

## 2022-06-04 NOTE — PROGRESS NOTE ADULT - PROBLEM SELECTOR PLAN 2
Assessment:  - patient presents for worsening confusion and forgetfulness after chemotherapy  - Intermittent period of confusion   - Procal neg   - CXR neg   - CT head unremarkable   - F/u Bcx, ucx   - possible depression component? f/u psych

## 2022-06-04 NOTE — BH CONSULTATION LIAISON ASSESSMENT NOTE - CASE SUMMARY
Chart reviewed. I personally saw and examined this patient using video platform. GIN Gomes was the presenter. GIN Gomes saw and examined the patient in person. I agree with GIN Gomes's history, mental status exam, and A/P with below additions. In brief, patient adamantly denies SI and denies writing a suicide note. She does not particularly feel depressed. Her affect is more anxious than depressed/dysthymic with a restriction of range. Her thought process appeared goal directed.  At this current juncture, plan per above. Would also consider working up for dementia/memory issues, as this may be a trigger that is worsening her overall well being- it may be the precipitant causing her to ask for help.

## 2022-06-04 NOTE — BH CONSULTATION LIAISON ASSESSMENT NOTE - CURRENT MEDICATION
MEDICATIONS  (STANDING):  ALPRAZolam 0.5 milliGRAM(s) Oral daily  atorvastatin 20 milliGRAM(s) Oral at bedtime  heparin   Injectable 5000 Unit(s) SubCutaneous every 12 hours  lactated ringers. 1000 milliLiter(s) (75 mL/Hr) IV Continuous <Continuous>  mirtazapine 45 milliGRAM(s) Oral at bedtime  pantoprazole    Tablet 40 milliGRAM(s) Oral before breakfast  valsartan 160 milliGRAM(s) Oral daily    MEDICATIONS  (PRN):  acetaminophen     Tablet .. 650 milliGRAM(s) Oral every 6 hours PRN Temp greater or equal to 38C (100.4F), Mild Pain (1 - 3)  melatonin 3 milliGRAM(s) Oral at bedtime PRN Insomnia

## 2022-06-04 NOTE — BH CONSULTATION LIAISON ASSESSMENT NOTE - OTHER
mother daughter home ( 2 roommate upstairs w her, daughter and son in law downstairs) concrete. limited . confused

## 2022-06-04 NOTE — PROGRESS NOTE ADULT - PROBLEM SELECTOR PLAN 8
- Orthostatics neg   - At home on valsartan-HTZ 320mg-12.5mg   - Renal function wnl. Will restart Valsartan at 160mg and titrate up if needed, will add HTZ if further BP control needed

## 2022-06-04 NOTE — PHARMACOTHERAPY INTERVENTION NOTE - COMMENTS
Medication list in OMR updated with fill history provided by [facility] (patient unsure of home meds).   Of Note:  - Alprazolam 0.5mg tabs - 1 tab PO QD PRN (last filled March/2022 x 3 day supply)  - Pharmacy also noted Anastrazole 1mg QD filled on 4/4/22 x 3 months supply (unsure if patient is still taking?)

## 2022-06-04 NOTE — CONSULT NOTE ADULT - SUBJECTIVE AND OBJECTIVE BOX
HPI:  76 y/o F with pmhx of recently diagnosed breast Ca on chemo CMF +Onpro cycle 1 on 5/27/2022, anxiety, COPD, HTN, HLD, presented to the ED for new onset worsening confusion and vague symptoms. As per note, patient is a poor historian due to frequent forgetfulness.  She  complained of new onset intermittent R sided face, ear and head dull pain. Denied focal deficits, ear drainage, vision changes/photophobia.  She has been having intermittent forgetfulness for the last few months. She also has been having dizziness whenever she walks. She would go shopping with the roommate and then on the first aisle in the supermarket she would say that she would feel dizziness and wants to go home. After she received chemotherapy, the confusion, forgetfullness and symptoms of dizziness has been getting worse. The patient would ask the same question multiple times. Of note her roommate endorsed that she has been more upset and sad lately, saying that the patient misses her . She also has been getting into fights with one of her daughters that lives on the first floor downstairs. They found a "suicide note" in her notebook when she was going to the ED today. She also has been known to have anxiety. At ER she denies suicidal or homicidal ideations. She also became teary, sad and anxious saying that she feels like that there is something wrong with her and she does not know what. She states she has not been feeling well since chemotherapy and is unable to elaborate further because she does not remember.     oncology team consulted in view of recent diagnosis of breast cancer s/p cycle 1 chemo.   During the encounter she denied worsening headache, dizziness, and confusion after admission. Denied suicidal ideation.       PAST MEDICAL & SURGICAL HISTORY:  COPD (chronic obstructive pulmonary disease) with emphysema      Anxiety and depression      Hypertension      HLD (hyperlipidemia)      LBBB (left bundle branch block)      Osteoarthritis      Memory loss      History of lumpectomy of left breast      Breast cancer, left      H/O colonoscopy      H/O tubal ligation      Fracture of toe of left foot  repair      S/P cataract surgery      History of lumpectomy of left breast          Allergies    penicillin (Hives)    Intolerances        MEDICATIONS  (STANDING):  ALPRAZolam 0.5 milliGRAM(s) Oral daily  atorvastatin 20 milliGRAM(s) Oral at bedtime  heparin   Injectable 5000 Unit(s) SubCutaneous every 12 hours  lactated ringers. 1000 milliLiter(s) (75 mL/Hr) IV Continuous <Continuous>  mirtazapine 45 milliGRAM(s) Oral at bedtime  pantoprazole    Tablet 40 milliGRAM(s) Oral before breakfast  valsartan 160 milliGRAM(s) Oral daily    MEDICATIONS  (PRN):  acetaminophen     Tablet .. 650 milliGRAM(s) Oral every 6 hours PRN Temp greater or equal to 38C (100.4F), Mild Pain (1 - 3)  melatonin 3 milliGRAM(s) Oral at bedtime PRN Insomnia      FAMILY HISTORY:  FHx: throat cancer (Sibling)    Family history of bladder cancer (Father)        SOCIAL HISTORY: No EtOH, no tobacco    REVIEW OF SYSTEMS:    See HPI       Weight (kg): 72.6 (06-04 @ 04:55)    T(F): 99.3 (06-04-22 @ 11:58), Max: 99.3 (06-04-22 @ 11:58)  HR: 89 (06-04-22 @ 11:58)  BP: 174/91 (06-04-22 @ 11:58)  RR: 18 (06-04-22 @ 11:58)  SpO2: 96% (06-04-22 @ 11:58)  Wt(kg): --    GENERAL: NAD, well-developed; AAOX4; Responded verbally well   HEAD:  Atraumatic, Normocephalic  EYES: EOMI, PERRLA, conjunctiva and sclera clear  NECK: Supple, No JVD  CHEST/LUNG: Clear to auscultation bilaterally; No wheeze  HEART: Regular rate and rhythm; No murmurs, rubs, or gallops  ABDOMEN: Soft, Nontender, Nondistended; Bowel sounds present  EXTREMITIES:  2+ Peripheral Pulses, No clubbing, cyanosis, or edema  NEUROLOGY: non-focal  SKIN: No rashes or lesions                          13.0   2.10  )-----------( 70       ( 03 Jun 2022 23:15 )             39.8       06-03    138  |  101  |  14  ----------------------------<  98  3.1<L>   |  25  |  0.73    Ca    8.7      03 Jun 2022 23:15  Phos  2.8     06-03  Mg     2.00     06-03    TPro  5.0<L>  /  Alb  3.2<L>  /  TBili  1.3<H>  /  DBili  x   /  AST  9   /  ALT  48<H>  /  AlkPhos  87  06-03      Magnesium, Serum: 2.00 mg/dL (06-03 @ 23:15)  Phosphorus Level, Serum: 2.8 mg/dL (06-03 @ 23:15)          Clean Catch Clean Catch (Midstream)  04-25 @ 15:15   Normal Urogenital yessy present  --  --

## 2022-06-04 NOTE — CONSULT NOTE ADULT - ASSESSMENT
78 y/o F with recently diagnosed breast Ca on chemo CMF +Onpro cycle 1 on 5/27/2022, anxiety, COPD, HTN, HLD, presented to the ED for new onset worsening forgetfullness/dizziness/confusion and vague symptoms.     #Breast Cancer s/p cycle 1 chemo cyclophosphamide +methotrexate +fluorouracil CMF + onpro (neulasta) on 5/27     #leukopenia and thrombocytopenia may related to chemo   -no plan for chemo as inpatient; pt will f/u with her med oncologist Marilyn Putnam at [facility] [place] after d/c  -f/u CBC with diff; LDH/uric acid  -CT head showing : Age-appropriate involutional change and microvascular ischemic disease age indeterminate. no e/o mass effects/hemorrhage/midline shift.   -neuro check; fall precaution; PT eval;  if worsening of dizziness, focal deficit, may consider MRI brain and f/u Neurology   -may consider Psych consult if pt agrees in view of suspecting suicidal note found   -supportive care including nutritional support   -DVT ppx     Discussed with attending Dr. Kylie Nye PGY4 PAGER: [phone]

## 2022-06-04 NOTE — PHYSICAL THERAPY INITIAL EVALUATION ADULT - ADDITIONAL COMMENTS
Pt lives in an apartment with roommates, independent with all mobility and ADLs prior to admission.    Pt left semi-supine in bed, all lines intact, call bell in reach, in NAD.

## 2022-06-04 NOTE — PATIENT PROFILE ADULT - FALL HARM RISK - HARM RISK INTERVENTIONS
Assistance with ambulation/Assistance OOB with selected safe patient handling equipment/Communicate Risk of Fall with Harm to all staff/Monitor for mental status changes/Monitor gait and stability/Reinforce activity limits and safety measures with patient and family/Reorient to person, place and time as needed/Tailored Fall Risk Interventions/Use of alarms - bed, chair and/or voice tab/Visual Cue: Yellow wristband and red socks/Bed in lowest position, wheels locked, appropriate side rails in place/Call bell, personal items and telephone in reach/Instruct patient to call for assistance before getting out of bed or chair/Non-slip footwear when patient is out of bed/[place] to call system/Physically safe environment - no spills, clutter or unnecessary equipment/Purposeful Proactive Rounding/Room/bathroom lighting operational, light cord in reach

## 2022-06-04 NOTE — PROGRESS NOTE ADULT - SUBJECTIVE AND OBJECTIVE BOX
Patient is a 77y old  Female who presents with a chief complaint of confusion (03 Jun 2022 23:01)      SUBJECTIVE / OVERNIGHT EVENTS: Examined at bedside, NAD. Resting comfortably in bed. No complaints  ADDITIONAL REVIEW OF SYSTEMS: Denies Darling, CP, SOB, fever, chills    MEDICATIONS  (STANDING):  ALPRAZolam 0.5 milliGRAM(s) Oral daily  atorvastatin 20 milliGRAM(s) Oral at bedtime  heparin   Injectable 5000 Unit(s) SubCutaneous every 12 hours  lactated ringers. 1000 milliLiter(s) (75 mL/Hr) IV Continuous <Continuous>  mirtazapine 45 milliGRAM(s) Oral at bedtime  pantoprazole    Tablet 40 milliGRAM(s) Oral before breakfast    MEDICATIONS  (PRN):  acetaminophen     Tablet .. 650 milliGRAM(s) Oral every 6 hours PRN Temp greater or equal to 38C (100.4F), Mild Pain (1 - 3)  melatonin 3 milliGRAM(s) Oral at bedtime PRN Insomnia        CAPILLARY BLOOD GLUCOSE        I&O's Summary      PHYSICAL EXAM:  Vital Signs Last 24 Hrs  T(C): 37.4 (04 Jun 2022 11:58), Max: 37.4 (04 Jun 2022 11:58)  T(F): 99.3 (04 Jun 2022 11:58), Max: 99.3 (04 Jun 2022 11:58)  HR: 89 (04 Jun 2022 11:58) (82 - 89)  BP: 174/91 (04 Jun 2022 11:58) (145/73 - 174/91)  BP(mean): --  RR: 18 (04 Jun 2022 11:58) (16 - 18)  SpO2: 96% (04 Jun 2022 11:58) (96% - 100%)    CONSTITUTIONAL: NAD, well-developed, well-groomed  EYES: PERRLA; conjunctiva and sclera clear  RESPIRATORY: Normal respiratory effort; lungs are clear to auscultation bilaterally  CARDIOVASCULAR: Regular rate and rhythm, normal S1 and S2, no murmur/rub/gallop  ABDOMEN: Nontender to palpation, normoactive bowel sounds, no rebound/guarding  MUSCLOSKELETAL:  Normal gait; no joint swelling or tenderness to palpation  PSYCH: A+O x2-3; affect appropriate  NEUROLOGY: CN 2-12 are intact; no gross sensory deficits   SKIN: No rashes; no palpable lesions    LABS:                                        13.0   2.10  )-----------( 70       ( 03 Jun 2022 23:15 )             39.8     06-03    138  |  101  |  14  ----------------------------<  98  3.1<L>   |  25  |  0.73    Ca    8.7      03 Jun 2022 23:15  Phos  2.8     06-03  Mg     2.00     06-03    TPro  5.0<L>  /  Alb  3.2<L>  /  TBili  1.3<H>  /  DBili  x   /  AST  9   /  ALT  48<H>  /  AlkPhos  87  06-03            RADIOLOGY & ADDITIONAL TESTS:  Results Reviewed:   Imaging Personally Reviewed:  Electrocardiogram Personally Reviewed:    COORDINATION OF CARE:  Care Discussed with Consultants/Other Providers [Y/N]:  Prior or Outpatient Records Reviewed [Y/N]:

## 2022-06-04 NOTE — PROGRESS NOTE ADULT - PROBLEM SELECTOR PLAN 5
- patient presented with new onset leukopenia after chemotherapy  - RVP neg   - trend labs   - infectious work up as above

## 2022-06-04 NOTE — PHYSICAL THERAPY INITIAL EVALUATION ADULT - GENERAL OBSERVATIONS, REHAB EVAL
Pt received semi-supine in bed, +telemetry, AAOx4, comfortable and in NAD. Pt agreeable to participate in PT evaluation.

## 2022-06-04 NOTE — BH CONSULTATION LIAISON ASSESSMENT NOTE - NSBHCHARTREVIEWVS_PSY_A_CORE FT
Vital Signs Last 24 Hrs  T(C): 37.4 (04 Jun 2022 11:58), Max: 37.4 (04 Jun 2022 11:58)  T(F): 99.3 (04 Jun 2022 11:58), Max: 99.3 (04 Jun 2022 11:58)  HR: 89 (04 Jun 2022 11:58) (82 - 89)  BP: 174/91 (04 Jun 2022 11:58) (145/73 - 174/91)  BP(mean): --  RR: 18 (04 Jun 2022 11:58) (16 - 18)  SpO2: 96% (04 Jun 2022 11:58) (96% - 100%)

## 2022-06-04 NOTE — PHYSICAL THERAPY INITIAL EVALUATION ADULT - PERTINENT HX OF CURRENT PROBLEM, REHAB EVAL
37.2 77 year old Female with pmhx of recently diagnosed breast Ca on chemo first session 5/27/2022, anxiety, COPD, HTN, HLD, presented to the ED for new onset worsening confusion and vague symptoms. Labs show leukopenia and hypokalemia but otherwise WNL. Admit for work up of confusion and weakness.

## 2022-06-04 NOTE — BH CONSULTATION LIAISON ASSESSMENT NOTE - RISK ASSESSMENT
risk: hx depression and anxiety, confusion, acute illness  Protective: support from friends, domiciled, no substance use, help seeking, on medications, no hx of SA, no current SI or HI

## 2022-06-04 NOTE — BH CONSULTATION LIAISON ASSESSMENT NOTE - HPI (INCLUDE ILLNESS QUALITY, SEVERITY, DURATION, TIMING, CONTEXT, MODIFYING FACTORS, ASSOCIATED SIGNS AND SYMPTOMS)
Patient is a 78 y/o F with pmhx of recently diagnosed breast Ca on chemo first session 5/27/2022, anxiety, COPD, HTN, HLD, presented to the ED for new onset worsening confusion and vague symptoms. Labs show leukopenia and hypokalemia. Patient comes from home, lives with 2 roommates upstiats and daughter and son in law downstairs. Patient is  for 10 years. PPHx of anxiety and depression. Denies inpt admission. Denies substance abuse. Denies a hx of SA.   Psychiatry called for depression, anxiety   Patient is a 76 y/o F with pmhx of recently diagnosed breast Ca on chemo first session 5/27/2022, anxiety, COPD, HTN, HLD, presented to the ED for new onset worsening confusion and vague symptoms. Labs show leukopenia and hypokalemia. Patient comes from home, lives with 2 roommates upstiats and daughter and son in law downstairs. Patient is  for 10 years. PPHx of anxiety and depression. Denies inpt admission. Denies substance abuse. Denies a hx of SA.   Psychiatry called for depression, anxiety, and questionable suicide note? as per H&P    Patient was seen and assessed at bedside. patient is alert, confused, cooperative. Patient states she is forgetful often, confused, anxious. She also states she is "not depressed" but lonely. Patient states she had her first round of chemo last week and has not being feeling well since. She has felt weak, more confused. Patient states at times she wishes she were not feeling like this but adamantly denies wanting to die or wanting to kill herself. Patient has no hx of SA and denies hx of passive SI as well.  Patient states "im not chicken" when asked about self harm as well as having a good support system in her roommates. patient is future oriented, talking about future treatment, going back home. Patient does report having some anxiety - however denies using xanax daily at home. DOes receive help with medication from her roommates and is unsure what else she takes. Patient denies psychosis. States "I don't remember writing a note" when asked about this questionable note that was found in her home. Again reports she does not want to die.    Collateral obtained from roommates jorge and ivette.  As per roommates patient has been confused, anxious, unable to care for self at home. She has not made any statements of wanting to die. She has been getting remeron 45mg nightly and seroquel 100mg qhs for 6 weeks, given by roommate. xanax not given. Feel she requires psychiatric care at this time.  Note was read to provider:  Note stated patient felt confused and not like herself. she expressed missing her . additionally she wrote that she needed help and was seeking help.   Clarified - note did not mention patient wanting to die nor wanting to harm herself or others.

## 2022-06-04 NOTE — BH CONSULTATION LIAISON ASSESSMENT NOTE - NSBHCHARTREVIEWLAB_PSY_A_CORE FT
13.0   2.10  )-----------( 70       ( 03 Jun 2022 23:15 )             39.8   06-03    138  |  101  |  14  ----------------------------<  98  3.1<L>   |  25  |  0.73    Ca    8.7      03 Jun 2022 23:15  Phos  2.8     06-03  Mg     2.00     06-03    TPro  5.0<L>  /  Alb  3.2<L>  /  TBili  1.3<H>  /  DBili  x   /  AST  9   /  ALT  48<H>  /  AlkPhos  87  06-03

## 2022-06-04 NOTE — BH CONSULTATION LIAISON ASSESSMENT NOTE - SUMMARY
Patient is a 78 y/o F with pmhx of recently diagnosed breast Ca on chemo first session 5/27/2022, anxiety, COPD, HTN, HLD, presented to the ED for new onset worsening confusion and vague symptoms. Labs show leukopenia and hypokalemia. Patient comes from home, lives with 2 roommates upstiats and daughter and son in law downstairs. Patient is  for 10 years. PPHx of anxiety and depression. Denies inpt admission. Denies substance abuse. Denies a hx of SA.   Psychiatry called for depression, anxiety, and questionable suicide note? as per H&P    *Note was read to provider:  Note stated patient felt confused and not like herself. she expressed missing her . additionally she wrote that she needed help and was seeking help.   Clarified - note did not mention patient wanting to die nor wanting to harm herself or others. *    PLAN  - patient with no SI or HI, no hx of SA, no psychosis, help seeking - routine observation at this time   - CONTINUE home medications  -- Remeron 45mg qhs  -- Seroquel 100mg qhs ** if qtc < 500  ---- antipsychotics can only be given if qtc < 500   - Can make xanax 0.5mg a PRN as patient does not use this daily at home -> can give xanax 0.5mg BID PRN  ---(istop Reference #: [id number])  - labs: b12, folate, tsh Patient is a 78 y/o F with pmhx of recently diagnosed breast Ca on chemo first session 5/27/2022, anxiety, COPD, HTN, HLD, presented to the ED for new onset worsening confusion and vague symptoms. Labs show leukopenia and hypokalemia. Patient comes from home, lives with 2 roommates upstiats and daughter and son in law downstairs. Patient is  for 10 years. PPHx of anxiety and depression. Denies inpt admission. Denies substance abuse. Denies a hx of SA.   Psychiatry called for depression, anxiety, and questionable suicide note? as per H&P    *Note was read to provider:  Note stated patient felt confused and not like herself. she expressed missing her . additionally she wrote that she needed help and was seeking help.   Clarified - note did not mention patient wanting to die nor wanting to harm herself or others. *    PLAN  - patient with no SI or HI, no hx of SA, no psychosis, help seeking - routine observation at this time   - CONTINUE home medications  -- Remeron 45mg qhs  -- Seroquel 100mg qhs ** if qtc < 500  ---- antipsychotics can only be given if qtc < 500   - Can make xanax 0.5mg a PRN as patient does not use this daily at home -> can give xanax 0.5mg BID PRN  ---(istop Reference #: [id number])  - labs: b12, folate, tsh  - consider neurology consult, may warrant MRI brain for further workup of cognitive dysfunction in a patient with breast cancer

## 2022-06-05 LAB
ANION GAP SERPL CALC-SCNC: 12 MMOL/L — SIGNIFICANT CHANGE UP (ref 7–14)
BUN SERPL-MCNC: 13 MG/DL — SIGNIFICANT CHANGE UP (ref 7–23)
CALCIUM SERPL-MCNC: 9 MG/DL — SIGNIFICANT CHANGE UP (ref 8.4–10.5)
CHLORIDE SERPL-SCNC: 105 MMOL/L — SIGNIFICANT CHANGE UP (ref 98–107)
CO2 SERPL-SCNC: 24 MMOL/L — SIGNIFICANT CHANGE UP (ref 22–31)
CREAT SERPL-MCNC: 0.78 MG/DL — SIGNIFICANT CHANGE UP (ref 0.5–1.3)
EGFR: 78 ML/MIN/1.73M2 — SIGNIFICANT CHANGE UP
GLUCOSE SERPL-MCNC: 99 MG/DL — SIGNIFICANT CHANGE UP (ref 70–99)
HCT VFR BLD CALC: 37.7 % — SIGNIFICANT CHANGE UP (ref 34.5–45)
HGB BLD-MCNC: 12.5 G/DL — SIGNIFICANT CHANGE UP (ref 11.5–15.5)
MAGNESIUM SERPL-MCNC: 1.7 MG/DL — SIGNIFICANT CHANGE UP (ref 1.6–2.6)
MCHC RBC-ENTMCNC: 28.3 PG — SIGNIFICANT CHANGE UP (ref 27–34)
MCHC RBC-ENTMCNC: 33.2 GM/DL — SIGNIFICANT CHANGE UP (ref 32–36)
MCV RBC AUTO: 85.5 FL — SIGNIFICANT CHANGE UP (ref 80–100)
NRBC # BLD: 0 /100 WBCS — SIGNIFICANT CHANGE UP
NRBC # FLD: 0.02 K/UL — HIGH
PHOSPHATE SERPL-MCNC: 2.2 MG/DL — LOW (ref 2.5–4.5)
PLATELET # BLD AUTO: 59 K/UL — LOW (ref 150–400)
POTASSIUM SERPL-MCNC: 3.2 MMOL/L — LOW (ref 3.5–5.3)
POTASSIUM SERPL-SCNC: 3.2 MMOL/L — LOW (ref 3.5–5.3)
RBC # BLD: 4.41 M/UL — SIGNIFICANT CHANGE UP (ref 3.8–5.2)
RBC # FLD: 13.9 % — SIGNIFICANT CHANGE UP (ref 10.3–14.5)
SODIUM SERPL-SCNC: 141 MMOL/L — SIGNIFICANT CHANGE UP (ref 135–145)
TSH SERPL-MCNC: 0.95 UIU/ML — SIGNIFICANT CHANGE UP (ref 0.27–4.2)
WBC # BLD: 9.37 K/UL — SIGNIFICANT CHANGE UP (ref 3.8–10.5)
WBC # FLD AUTO: 9.37 K/UL — SIGNIFICANT CHANGE UP (ref 3.8–10.5)

## 2022-06-05 PROCEDURE — 99233 SBSQ HOSP IP/OBS HIGH 50: CPT

## 2022-06-05 RX ORDER — ALPRAZOLAM 0.25 MG
0.5 TABLET ORAL
Refills: 0 | Status: DISCONTINUED | OUTPATIENT
Start: 2022-06-05 | End: 2022-06-10

## 2022-06-05 RX ORDER — POTASSIUM CHLORIDE 20 MEQ
40 PACKET (EA) ORAL EVERY 4 HOURS
Refills: 0 | Status: COMPLETED | OUTPATIENT
Start: 2022-06-05 | End: 2022-06-05

## 2022-06-05 RX ORDER — SODIUM,POTASSIUM PHOSPHATES 278-250MG
1 POWDER IN PACKET (EA) ORAL ONCE
Refills: 0 | Status: COMPLETED | OUTPATIENT
Start: 2022-06-05 | End: 2022-06-05

## 2022-06-05 RX ADMIN — Medication 0.5 MILLIGRAM(S): at 21:28

## 2022-06-05 RX ADMIN — PANTOPRAZOLE SODIUM 40 MILLIGRAM(S): 20 TABLET, DELAYED RELEASE ORAL at 05:27

## 2022-06-05 RX ADMIN — Medication 0.5 MILLIGRAM(S): at 09:23

## 2022-06-05 RX ADMIN — HEPARIN SODIUM 5000 UNIT(S): 5000 INJECTION INTRAVENOUS; SUBCUTANEOUS at 05:27

## 2022-06-05 RX ADMIN — MIRTAZAPINE 45 MILLIGRAM(S): 45 TABLET, ORALLY DISINTEGRATING ORAL at 21:29

## 2022-06-05 RX ADMIN — ATORVASTATIN CALCIUM 20 MILLIGRAM(S): 80 TABLET, FILM COATED ORAL at 21:30

## 2022-06-05 RX ADMIN — Medication 650 MILLIGRAM(S): at 10:20

## 2022-06-05 RX ADMIN — HEPARIN SODIUM 5000 UNIT(S): 5000 INJECTION INTRAVENOUS; SUBCUTANEOUS at 18:38

## 2022-06-05 RX ADMIN — VALSARTAN 160 MILLIGRAM(S): 80 TABLET ORAL at 05:27

## 2022-06-05 RX ADMIN — Medication 3 MILLIGRAM(S): at 00:46

## 2022-06-05 RX ADMIN — Medication 1 PACKET(S): at 14:45

## 2022-06-05 RX ADMIN — SODIUM CHLORIDE 75 MILLILITER(S): 9 INJECTION, SOLUTION INTRAVENOUS at 18:39

## 2022-06-05 RX ADMIN — Medication 650 MILLIGRAM(S): at 09:23

## 2022-06-05 RX ADMIN — Medication 40 MILLIEQUIVALENT(S): at 14:45

## 2022-06-05 RX ADMIN — SODIUM CHLORIDE 75 MILLILITER(S): 9 INJECTION, SOLUTION INTRAVENOUS at 21:29

## 2022-06-05 RX ADMIN — Medication 40 MILLIEQUIVALENT(S): at 18:36

## 2022-06-05 RX ADMIN — SODIUM CHLORIDE 75 MILLILITER(S): 9 INJECTION, SOLUTION INTRAVENOUS at 14:44

## 2022-06-05 NOTE — PROGRESS NOTE ADULT - PROBLEM SELECTOR PLAN 5
- Improved   - patient presented with new onset leukopenia after chemotherapy  - RVP neg   - Also thrombocytopenia, no overt s&S bleeding, likely in setting of recent chemo  - trend labs   - infectious work up as above

## 2022-06-05 NOTE — PROGRESS NOTE ADULT - PROBLEM SELECTOR PLAN 2
Assessment:  - patient presents for worsening confusion and forgetfulness after chemotherapy  - Intermittent period of confusion, improving  - Possible underlying dementia   - Procal neg   - CXR neg, CT head unremarkable   - F/u Bcx NTD, ucx   - Apprec Psych rec:  TSH, B12, folate (ordered). Remeron 45mg qhs, Seroquel 100mg qhs   Consider neurology consult, may warrant MRI brain for further w/u of cognitive dysfunction as Pt w/breast cancer.   - Apprec hem/onc rec: no chemo inpatient, if worsening of dizziness, focal deficit, may consider MRI brain and f/u Neurology       - Hold off on MRI brain for now as CT neg, no overt focal neurologic deficit Assessment:  - patient presents for worsening confusion and forgetfulness after chemotherapy  - Intermittent period of confusion, improving  - Possible underlying dementia   - Procal neg   - CXR neg, CT head unremarkable   - F/u Bcx NTD, ucx   - Apprec Psych rec:  TSH, B12, folate (ordered). Remeron 45mg qhs, Seroquel 100mg qhs   Consider neurology consult, may warrant MRI brain for further w/u of cognitive dysfunction as Pt w/breast cancer.   - Apprec hem/onc rec: no chemo inpatient, if worsening of dizziness, focal deficit, may consider MRI brain and f/u Neurology   - Will hold off on Neuro & MRI brain for now as CT neg, no overt focal neurologic deficit Assessment:  - patient presents for worsening confusion and forgetfulness after chemotherapy  - Intermittent period of confusion, improving  - Possible underlying dementia   - Procal neg   - CXR neg, CT head unremarkable   - F/u Bcx NTD, ucx   - Apprec Psych rec:  TSH, B12, folate (ordered). Remeron 45mg qhs, Seroquel 100mg qhs   - Apprec hem/onc rec: no chemo inpatient,    - Will hold off MRI brain for now , will consult neuro for eval of cognitive dysfunction Assessment:  - patient presents for worsening confusion and forgetfulness after chemotherapy  - Intermittent period of confusion, improving  - Possible underlying dementia   - Procal neg   - CXR neg, CT head unremarkable   - F/u Bcx NTD, ucx   - Apprec Psych rec:  TSH, B12, folate (ordered). Remeron 45mg qhs, Seroquel 100mg qhs   - Apprec hem/onc rec: no chemo inpatient,    - Will hold off MRI brain for now , will consult neuro for eval of cognitive dysfunction  if worsening of dizziness, focal deficit, may consider MRI brain

## 2022-06-05 NOTE — PROGRESS NOTE ADULT - PROBLEM SELECTOR PLAN 6
- heme/onc consulted: no plan for chemo as inpatient; pt to [place] Marilyn [place] [facility]

## 2022-06-05 NOTE — PROGRESS NOTE ADULT - SUBJECTIVE AND OBJECTIVE BOX
Patient is a 77y old  Female who presents with a chief complaint of confusion (03 Jun 2022 23:01)      SUBJECTIVE / OVERNIGHT EVENTS: Pt seen at bedside, complained about breakfast tray being left at bedside w/o being woken up. Reports Rt ear discomfort chronic, recent CT head result reviewed with pt.   ADDITIONAL REVIEW OF SYSTEMS: Denies Darling, CP, SOB, fever, chills, vision change     MEDICATIONS  (STANDING):  atorvastatin 20 milliGRAM(s) Oral at bedtime  heparin   Injectable 5000 Unit(s) SubCutaneous every 12 hours  lactated ringers. 1000 milliLiter(s) (75 mL/Hr) IV Continuous <Continuous>  mirtazapine 45 milliGRAM(s) Oral at bedtime  pantoprazole    Tablet 40 milliGRAM(s) Oral before breakfast  valsartan 160 milliGRAM(s) Oral daily    MEDICATIONS  (PRN):  acetaminophen     Tablet .. 650 milliGRAM(s) Oral every 6 hours PRN Temp greater or equal to 38C (100.4F), Mild Pain (1 - 3)  ALPRAZolam 0.5 milliGRAM(s) Oral two times a day PRN anxious  melatonin 3 milliGRAM(s) Oral at bedtime PRN Insomnia      CAPILLARY BLOOD GLUCOSE        I&O's Summary      PHYSICAL EXAM:  Vital Signs Last 24 Hrs  T(C): 36.7 (05 Jun 2022 12:00), Max: 37.7 (05 Jun 2022 05:21)  T(F): 98.1 (05 Jun 2022 12:00), Max: 99.9 (05 Jun 2022 05:21)  HR: 91 (05 Jun 2022 12:00) (91 - 100)  BP: 143/75 (05 Jun 2022 12:00) (130/67 - 160/85)  BP(mean): --  RR: 17 (05 Jun 2022 12:00) (16 - 17)  SpO2: 95% (05 Jun 2022 12:00) (93% - 97%)    CONSTITUTIONAL: NAD, well-developed, well-groomed  EYES: PERRLA; conjunctiva and sclera clear, exam of ear unrevealing   RESPIRATORY: Normal respiratory effort; lungs are clear to auscultation bilaterally  CARDIOVASCULAR: Regular rate and rhythm, normal S1 and S2, no murmur/rub/gallop  ABDOMEN: Nontender to palpation, normoactive bowel sounds, no rebound/guarding  MUSCLOSKELETAL:  Normal gait; no joint swelling or tenderness to palpation  PSYCH: A+O x2-3; affect appropriate  NEUROLOGY: CN 2-12 are intact; no gross sensory deficits   SKIN: No rashes; no palpable lesions    LABS:                             12.5   9.37  )-----------( 59       ( 05 Jun 2022 07:00 )             37.7   06-05    141  |  105  |  13  ----------------------------<  99  3.2<L>   |  24  |  0.78    Ca    9.0      05 Jun 2022 07:00  Phos  2.2     06-05  Mg     1.70     06-05    TPro  5.0<L>  /  Alb  3.2<L>  /  TBili  1.3<H>  /  DBili  x   /  AST  9   /  ALT  48<H>  /  AlkPhos  87  06-03          RADIOLOGY & ADDITIONAL TESTS:  Results Reviewed:   Imaging Personally Reviewed:  Electrocardiogram Personally Reviewed:    COORDINATION OF CARE:  Care Discussed with Consultants/Other Providers [Y/N]:  Prior or Outpatient Records Reviewed [Y/N]:

## 2022-06-06 LAB
ANION GAP SERPL CALC-SCNC: 9 MMOL/L — SIGNIFICANT CHANGE UP (ref 7–14)
BASOPHILS # BLD AUTO: 0 K/UL — SIGNIFICANT CHANGE UP (ref 0–0.2)
BASOPHILS NFR BLD AUTO: 0 % — SIGNIFICANT CHANGE UP (ref 0–2)
BUN SERPL-MCNC: 9 MG/DL — SIGNIFICANT CHANGE UP (ref 7–23)
CALCIUM SERPL-MCNC: 8.8 MG/DL — SIGNIFICANT CHANGE UP (ref 8.4–10.5)
CHLORIDE SERPL-SCNC: 107 MMOL/L — SIGNIFICANT CHANGE UP (ref 98–107)
CO2 SERPL-SCNC: 26 MMOL/L — SIGNIFICANT CHANGE UP (ref 22–31)
CREAT SERPL-MCNC: 0.87 MG/DL — SIGNIFICANT CHANGE UP (ref 0.5–1.3)
EGFR: 69 ML/MIN/1.73M2 — SIGNIFICANT CHANGE UP
EOSINOPHIL # BLD AUTO: 0.69 K/UL — HIGH (ref 0–0.5)
EOSINOPHIL NFR BLD AUTO: 3.6 % — SIGNIFICANT CHANGE UP (ref 0–6)
FOLATE SERPL-MCNC: 9.1 NG/ML — SIGNIFICANT CHANGE UP (ref 3.1–17.5)
GLUCOSE SERPL-MCNC: 95 MG/DL — SIGNIFICANT CHANGE UP (ref 70–99)
HCT VFR BLD CALC: 36.9 % — SIGNIFICANT CHANGE UP (ref 34.5–45)
HGB BLD-MCNC: 12 G/DL — SIGNIFICANT CHANGE UP (ref 11.5–15.5)
IANC: 12.54 K/UL — HIGH (ref 1.8–7.4)
LYMPHOCYTES # BLD AUTO: 11.8 % — LOW (ref 13–44)
LYMPHOCYTES # BLD AUTO: 2.26 K/UL — SIGNIFICANT CHANGE UP (ref 1–3.3)
MAGNESIUM SERPL-MCNC: 1.6 MG/DL — SIGNIFICANT CHANGE UP (ref 1.6–2.6)
MCHC RBC-ENTMCNC: 28.6 PG — SIGNIFICANT CHANGE UP (ref 27–34)
MCHC RBC-ENTMCNC: 32.5 GM/DL — SIGNIFICANT CHANGE UP (ref 32–36)
MCV RBC AUTO: 88.1 FL — SIGNIFICANT CHANGE UP (ref 80–100)
MONOCYTES # BLD AUTO: 1.91 K/UL — HIGH (ref 0–0.9)
MONOCYTES NFR BLD AUTO: 10 % — SIGNIFICANT CHANGE UP (ref 2–14)
NEUTROPHILS # BLD AUTO: 13.75 K/UL — HIGH (ref 1.8–7.4)
NEUTROPHILS NFR BLD AUTO: 47.3 % — SIGNIFICANT CHANGE UP (ref 43–77)
NRBC # BLD: 0 /100 WBCS — SIGNIFICANT CHANGE UP
NRBC # FLD: 0.04 K/UL — HIGH
PHOSPHATE SERPL-MCNC: 2.4 MG/DL — LOW (ref 2.5–4.5)
PLATELET # BLD AUTO: 78 K/UL — LOW (ref 150–400)
POTASSIUM SERPL-MCNC: 4.5 MMOL/L — SIGNIFICANT CHANGE UP (ref 3.5–5.3)
POTASSIUM SERPL-SCNC: 4.5 MMOL/L — SIGNIFICANT CHANGE UP (ref 3.5–5.3)
RBC # BLD: 4.19 M/UL — SIGNIFICANT CHANGE UP (ref 3.8–5.2)
RBC # FLD: 14.5 % — SIGNIFICANT CHANGE UP (ref 10.3–14.5)
SODIUM SERPL-SCNC: 142 MMOL/L — SIGNIFICANT CHANGE UP (ref 135–145)
VIT B12 SERPL-MCNC: 1331 PG/ML — HIGH (ref 200–900)
WBC # BLD: 19.13 K/UL — HIGH (ref 3.8–10.5)
WBC # FLD AUTO: 19.13 K/UL — HIGH (ref 3.8–10.5)

## 2022-06-06 PROCEDURE — 90792 PSYCH DIAG EVAL W/MED SRVCS: CPT

## 2022-06-06 PROCEDURE — 99233 SBSQ HOSP IP/OBS HIGH 50: CPT

## 2022-06-06 RX ORDER — VENLAFAXINE HCL 75 MG
37.5 CAPSULE, EXT RELEASE 24 HR ORAL DAILY
Refills: 0 | Status: DISCONTINUED | OUTPATIENT
Start: 2022-06-06 | End: 2022-06-06

## 2022-06-06 RX ORDER — VENLAFAXINE HCL 75 MG
37.5 CAPSULE, EXT RELEASE 24 HR ORAL ONCE
Refills: 0 | Status: DISCONTINUED | OUTPATIENT
Start: 2022-06-06 | End: 2022-06-06

## 2022-06-06 RX ORDER — MIRTAZAPINE 45 MG/1
1 TABLET, ORALLY DISINTEGRATING ORAL
Qty: 0 | Refills: 0 | DISCHARGE

## 2022-06-06 RX ORDER — VENLAFAXINE HCL 75 MG
37.5 CAPSULE, EXT RELEASE 24 HR ORAL DAILY
Refills: 0 | Status: DISCONTINUED | OUTPATIENT
Start: 2022-06-07 | End: 2022-06-08

## 2022-06-06 RX ORDER — ATORVASTATIN CALCIUM 80 MG/1
1 TABLET, FILM COATED ORAL
Qty: 0 | Refills: 0 | DISCHARGE

## 2022-06-06 RX ORDER — SODIUM,POTASSIUM PHOSPHATES 278-250MG
1 POWDER IN PACKET (EA) ORAL
Refills: 0 | Status: COMPLETED | OUTPATIENT
Start: 2022-06-06 | End: 2022-06-07

## 2022-06-06 RX ORDER — QUETIAPINE FUMARATE 200 MG/1
100 TABLET, FILM COATED ORAL AT BEDTIME
Refills: 0 | Status: DISCONTINUED | OUTPATIENT
Start: 2022-06-06 | End: 2022-06-08

## 2022-06-06 RX ORDER — VENLAFAXINE HCL 75 MG
37.5 CAPSULE, EXT RELEASE 24 HR ORAL DAILY
Refills: 0 | Status: COMPLETED | OUTPATIENT
Start: 2022-06-06 | End: 2022-06-06

## 2022-06-06 RX ADMIN — MIRTAZAPINE 45 MILLIGRAM(S): 45 TABLET, ORALLY DISINTEGRATING ORAL at 22:18

## 2022-06-06 RX ADMIN — PANTOPRAZOLE SODIUM 40 MILLIGRAM(S): 20 TABLET, DELAYED RELEASE ORAL at 05:29

## 2022-06-06 RX ADMIN — Medication 0.5 MILLIGRAM(S): at 23:51

## 2022-06-06 RX ADMIN — HEPARIN SODIUM 5000 UNIT(S): 5000 INJECTION INTRAVENOUS; SUBCUTANEOUS at 05:29

## 2022-06-06 RX ADMIN — QUETIAPINE FUMARATE 100 MILLIGRAM(S): 200 TABLET, FILM COATED ORAL at 22:19

## 2022-06-06 RX ADMIN — Medication 37.5 MILLIGRAM(S): at 14:39

## 2022-06-06 RX ADMIN — Medication 1 PACKET(S): at 17:54

## 2022-06-06 RX ADMIN — VALSARTAN 160 MILLIGRAM(S): 80 TABLET ORAL at 17:55

## 2022-06-06 RX ADMIN — HEPARIN SODIUM 5000 UNIT(S): 5000 INJECTION INTRAVENOUS; SUBCUTANEOUS at 17:55

## 2022-06-06 RX ADMIN — ATORVASTATIN CALCIUM 20 MILLIGRAM(S): 80 TABLET, FILM COATED ORAL at 22:19

## 2022-06-06 NOTE — PROGRESS NOTE ADULT - PROBLEM SELECTOR PLAN 2
Assessment:  - patient presents for worsening confusion and forgetfulness after chemotherapy  - Intermittent period of confusion, improving  - Possible underlying dementia   - Procal neg   - CXR neg, CT head unremarkable   - F/u Bcx NTD, ucx   - Apprec Psych rec:  TSH: wnl  B12: elevated , folate: wnl  Remeron 45mg qhs, Seroquel 100mg qhs   - Apprec hem/onc rec: no chemo inpatient,    - Neurology consulted, appreciate recs Will f/u  MRI brain Assessment:  - patient presents for worsening confusion and forgetfulness after chemotherapy.  Intermittent period of confusion, improving: possible underlying dementia as reversible causes of dementia unremarkable  Bcx NTD, ucx   - Apprec Psych rec:  TSH: wnl  B12: elevated , folate: wnl  - Procal neg   - CXR neg, CT head unremarkable   - F/u  Remeron 45mg qhs, Seroquel 100mg qhs   - Apprec hem/onc rec: no chemo inpatient,    - Neurology consulted, appreciate recs Will f/u  MRI brain

## 2022-06-06 NOTE — PROGRESS NOTE ADULT - PROBLEM SELECTOR PLAN 6
- heme/onc consulted: no plan for chemo as inpatient; pt to f/u with her med oncologist Marilyn Putnam at [facility] [place]c

## 2022-06-06 NOTE — BH CONSULTATION LIAISON PROGRESS NOTE - OTHER
Patent kept answering with " I don't know" not sure if actual confusion or decreased cooperation/giving up MOCA 20/30

## 2022-06-06 NOTE — BH CONSULTATION LIAISON PROGRESS NOTE - NSBHASSESSMENTFT_PSY_ALL_CORE
Patient is a 78 y/o F with pmhx of recently diagnosed breast Ca on chemo first session 5/27/2022, anxiety, COPD, HTN, HLD, presented to the ED for new onset worsening confusion and vague symptoms.  Patient is a 76 y/o  F, living in her own house with daughter, with pmhx of recently diagnosed breast Ca on chemo first session 5/27/2022, anxiety, COPD, HTN, HLD, presented to the ED for new onset worsening confusion and vague symptoms. 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 Patient scored 20/30 on MoCA but on observation patient is less likely to answer questions that require more effort. Patient should be treated for dementia. Collateral will be taken from outpatient psychiatrist and daughter    PLAN  - patient with no SI or HI, no hx of SA, no psychosis, help seeking   - CONTINUE home medications  -- Remeron 45mg qhs  -- Seroquel 100mg qhs ** if qtc < 500  - add Effexor XR 37.5 PO AM  ---- antipsychotics can only be given if qtc < 500   - Can make xanax 0.5mg a PRN as patient does not use this daily at home -> can give xanax 0.5mg BID PRN  ---(istop Reference #: [id number])  - labs: b12, folate, tsh  - consider neurology consult, may warrant MRI brain for further workup of cognitive dysfunction in a patient with breast cancer   Patient is a 78 y/o  F, living in her own house with daughter, with pmhx of recently diagnosed breast Ca on chemo first session 5/27/2022, anxiety, COPD, HTN, HLD, presented to the ED for new onset worsening confusion and vague symptoms. 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 Patient scored 20/30 on MoCA but on observation patient is less likely to answer questions that require more effort. Patient should be treated for dementia. Collateral will be taken from outpatient psychiatrist and daughter    PLAN  - patient with no SI or HI, no hx of SA, no psychosis, help seeking   - CONTINUE home medications  -- Remeron 45mg qhs  -- Seroquel 100mg qhs ** if qtc < 500  - START Effexor XR 37.5 PO AM  ---- antipsychotics can only be given if qtc < 500   - Can make xanax 0.5mg a PRN as patient does not use this daily at home -> can give xanax 0.5mg BID PRN  ---(istop Reference #: [id number])  - labs: b12, folate, tsh  - consider neurology consult, may warrant MRI brain for further workup of cognitive dysfunction in a patient with breast cancer     Patient is a 76 y/o  F, living in her own house with daughter, with pmhx of recently diagnosed breast Ca on chemo first session 5/27/2022, anxiety, COPD, HTN, HLD, presented to the ED for new onset worsening confusion and vague symptoms. 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 Patient scored 20/30 on MoCA but on observation patient is less likely to answer questions that require more effort. Patient should be treated for dementia and undergo full dementia workup and workup for brain mets given active cancer. Collateral will be taken from outpatient psychiatrist and daughter    PLAN  - patient with no SI or HI, no hx of SA, no psychosis, help seeking   - CONTINUE home medications  -- Remeron 45mg qhs  -- Seroquel 100mg qhs ** if qtc < 500  - START Effexor XR 37.5 PO AM  ---- antipsychotics can only be given if qtc < 500   - Can make xanax 0.5mg a PRN as patient does not use this daily at home -> can give xanax 0.5mg BID PRN  ---(istop Reference #: [id number])  - labs: b12, folate, tsh  - consider neurology consult  - given risk of brain mets consider MRI with/without contrast   - psych will follow re: dispo

## 2022-06-06 NOTE — BH CONSULTATION LIAISON PROGRESS NOTE - NSBHFUPINTERVALHXFT_PSY_A_CORE
Patient is confused about what is going on with her memory: "something is going on in my brain." Patient cannot recall  the last time she felt well and cannot identify a precipitating factor. When asked any questions about history patient responds with "I don't know." Patient lives in her own house and has two daughters. Patient cannot make any definitive statements: "I think I have two cats," "I think my daughter is in her 40s," "I think I used to like to read." States people have been "annoying" at the hospital. Patient does not drink alcohol, take recreational drugs. Patient had 1pack/day smoking history fo 50-60 years. She stopped at the beginning of the pandemic. Patient states she has no energy, normal sleep and appetite. When asked about mood and anxiety patient responds "I try not to feel anything." Patient denies hallucinations, delusions, or paranoia. Patient denies SI/HI. Patient is confused about what is going on with her memory: "something is going on in my brain." Patient cannot recall  the last time she felt well and cannot identify a precipitating factor. When asked any questions about history patient responds with "I don't know." Patient lives in her own house and has two daughters. Patient cannot make any definitive statements: "I think I have two cats," "I think my daughter is in her 40s," "I think I used to like to read." States people have been "annoying" at the hospital. Patient does not drink alcohol, take recreational drugs. Patient had 1pack/day smoking history fo 50-60 years. She stopped at the beginning of the pandemic. Patient states she has no energy, normal sleep and appetite. When asked about mood and anxiety patient responds "I try not to feel anything." Patient denies hallucinations, delusions, or paranoia. Patient denies SI/HI.    Ear pain that was last described has moved to right front head.

## 2022-06-06 NOTE — PROGRESS NOTE ADULT - SUBJECTIVE AND OBJECTIVE BOX
[facility]  Division of Hospital Medicine  Becky Napoles MD  Pager: [phone]      Patient is a 77y old  Female who presents with a chief complaint of confusion (06 Jun 2022 13:16)      SUBJECTIVE / OVERNIGHT EVENTS: Patient examined at bedside. Patient is AOx 3 - j and June. Reports that she has trouble taking care of herself. Daughter lives downstairs. Open to A.  Spoke with daughter and roommate ivette. Neurology and psych evaluating patient. Denies any dizziness or weakness.   ADDITIONAL REVIEW OF SYSTEMS:    MEDICATIONS  (STANDING):  atorvastatin 20 milliGRAM(s) Oral at bedtime  heparin   Injectable 5000 Unit(s) SubCutaneous every 12 hours  lactated ringers. 1000 milliLiter(s) (75 mL/Hr) IV Continuous <Continuous>  mirtazapine 45 milliGRAM(s) Oral at bedtime  pantoprazole    Tablet 40 milliGRAM(s) Oral before breakfast  potassium phosphate / sodium phosphate Powder (PHOS-NaK) 1 Packet(s) Oral two times a day  QUEtiapine 100 milliGRAM(s) Oral at bedtime  valsartan 160 milliGRAM(s) Oral daily  venlafaxine 37.5 milliGRAM(s) Oral once    MEDICATIONS  (PRN):  acetaminophen     Tablet .. 650 milliGRAM(s) Oral every 6 hours PRN Temp greater or equal to 38C (100.4F), Mild Pain (1 - 3)  ALPRAZolam 0.5 milliGRAM(s) Oral two times a day PRN anxious  melatonin 3 milliGRAM(s) Oral at bedtime PRN Insomnia      CAPILLARY BLOOD GLUCOSE        I&O's Summary      PHYSICAL EXAM:  Vital Signs Last 24 Hrs  T(C): 37.2 (06 Jun 2022 11:57), Max: 37.2 (06 Jun 2022 11:57)  T(F): 98.9 (06 Jun 2022 11:57), Max: 98.9 (06 Jun 2022 11:57)  HR: 94 (06 Jun 2022 11:57) (80 - 94)  BP: 138/76 (06 Jun 2022 11:57) (131/72 - 138/76)  BP(mean): --  RR: 18 (06 Jun 2022 11:57) (17 - 18)  SpO2: 97% (06 Jun 2022 11:57) (97% - 98%)  CONSTITUTIONAL: Elderly woman in NAD, well-developed, well-groomed  RESPIRATORY: Normal respiratory effort; lungs are clear to auscultation bilaterally  CARDIOVASCULAR: Regular rate and rhythm, normal S1 and S2, no murmur/rub/gallop; No lower extremity edema; Peripheral pulses are 2+ bilaterally  ABDOMEN: Nontender to palpation, normoactive bowel sounds, no rebound/guarding; No hepatosplenomegaly  PSYCH: A+O to person, place, and time; - LIJ and June doesnt know month  affect appropriate  NEUROLOGY:  no gross sensory deficits   SKIN: No rashes; no palpable lesions    LABS:                        12.0   19.13 )-----------( 78       ( 06 Jun 2022 06:02 )             36.9     06-06    142  |  107  |  9   ----------------------------<  95  4.5   |  26  |  0.87    Ca    8.8      06 Jun 2022 06:02  Phos  2.4     06-06  Mg     1.60     06-06                Culture - Blood (collected 04 Jun 2022 00:16)  Source: .Blood Blood-Peripheral  Preliminary Report (05 Jun 2022 03:01):    No growth to date.    Culture - Blood (collected 03 Jun 2022 23:55)  Source: .Blood Blood-Peripheral  Preliminary Report (05 Jun 2022 03:01):    No growth to date.        RADIOLOGY & ADDITIONAL TESTS:  Results Reviewed:   Imaging Personally Reviewed:  Electrocardiogram Personally Reviewed:    COORDINATION OF CARE:  Care Discussed with Consultants/Other Providers [Y/N]:  Prior or Outpatient Records Reviewed [Y/N]:

## 2022-06-06 NOTE — CONSULT NOTE ADULT - ATTENDING COMMENTS
Patient is seen; she has confusion and memory loss; although she is in hospital one day, she thinks that she has been here for several days. She does not recollect recent events and may have dementia. Review of serum chemistry shows no electrolyte disturbance and there are no focal findings to suggest acute neurological event. Continue current monitoring. No chemotherapy planned in this admission
Cognitive decline.  DDx: related to medical illness, depression, r/o primary neurological cause.  MRI brain/IAC w/wo gado  I agree with work up and management as above.     Thank you

## 2022-06-06 NOTE — CONSULT NOTE ADULT - SUBJECTIVE AND OBJECTIVE BOX
HPI:  This is a 76 y/o F with pmhx of recently diagnosed breast Ca on chemo first session 5/27/2022, anxiety, COPD, HTN, HLD, presented to the ED for new onset worsening confusion and vague symptoms. The patient is a poor historian, often forgetful, wants me to call ivette for information. However, the patient was complaining of new onset R sided face, ear and head pain that is dull in nature. Reports that it is intermittent and on/off. She initially did not notice this in the past however today noticed it. Denies focal deficits. Denies ear drainage. No photophobia.    I spoke with ivette at the phone number listed above. The patient was sent to the ED before she has been in bed all day today. She has been having intermittent forgetfulness for the last few months. She also has been having dizziness whenever she walks. She would go shopping with the roommate and then on the first aisle in the supermarket she would say that she would feel dizziness and wants to go home. After she received chemotherapy, the confusion, forgetfullness and symptoms of dizziness is worse. The patient would ask the same question multiple times. Of note, the roommate endorsed that she has been more upset and sad lately, saying that the patient misses her . She also has been getting into fights with one of her daughters that lives on the first floor downstairs. They found a "suicide note" in her notebook when she was going to the ED today. She also has been known to have anxiety.    Spoke to patient again, she denies suicidal or homicidal ideations. She also became teary, sad and anxious saying that she feels like that there is something wrong with her and she does not know what. She states she has not been feeling well since chemotherapy and is unable to elaborate further because she does not remember. (03 Jun 2022 23:01)    (Stroke only)  LKN:  NIHSS:   preMRS:   Pt is not a candidate for tpa due to [outside tpa window / mild, non-disabling deficit]  Pt is not a candidate for mechanical thrombectomy due to no large vessel occlusion on CTA    REVIEW OF SYSTEMS  General:	  Skin/Breast:	  Ophthalmologic:  ENMT:	  Respiratory and Thorax:	  Cardiovascular:	  Gastrointestinal:	  Genitourinary:	  Musculoskeletal:	  Neurological:	  Psychiatric:	  Hematology/Lymphatics:	  Endocrine:	  Allergic/Immunologic:	    A 10-system ROS was performed and is negative except for those items noted above and/or in the HPI.    PAST MEDICAL & SURGICAL HISTORY:  COPD (chronic obstructive pulmonary disease) with emphysema      Anxiety and depression      Hypertension      HLD (hyperlipidemia)      LBBB (left bundle branch block)      Osteoarthritis      Memory loss      History of lumpectomy of left breast      Breast cancer, left      H/O colonoscopy      H/O tubal ligation      Fracture of toe of left foot  repair      S/P cataract surgery      History of lumpectomy of left breast        FAMILY HISTORY:  FHx: throat cancer (Sibling)    Family history of bladder cancer (Father)      SOCIAL HISTORY:   T/E/D:   Occupation:   Lives with:     MEDICATIONS (HOME):  Home Medications:  ALPRAZolam 0.5 mg oral tablet: 1 tab(s) orally once a day as needed (Last filled 3/24/22 x 3 day supply) (06 Jun 2022 07:28)  atorvastatin 20 mg oral tablet: 1 tab(s) orally once a day (06 Jun 2022 07:28)  Claritin 10 mg oral tablet: x 5days post treatment , prevent bone pain (04 Jun 2022 09:47)  Decadron: 4  orally days 2,3,4 post chemo (04 Jun 2022 09:47)  mirtazapine 45 mg oral tablet: 1 tab(s) orally once a day (at bedtime) (06 Jun 2022 07:28)  omeprazole 40 mg oral delayed release capsule: 1 cap(s) orally once a day (06 Jun 2022 07:28)  QUEtiapine 100 mg oral tablet: 1 tab(s) orally once a day (06 Jun 2022 07:28)  valsartan-hydrochlorothiazide 320 mg-12.5 mg oral tablet: 1 tab(s) orally once a day (06 Jun 2022 07:28)    MEDICATIONS  (STANDING):  atorvastatin 20 milliGRAM(s) Oral at bedtime  heparin   Injectable 5000 Unit(s) SubCutaneous every 12 hours  lactated ringers. 1000 milliLiter(s) (75 mL/Hr) IV Continuous <Continuous>  mirtazapine 45 milliGRAM(s) Oral at bedtime  pantoprazole    Tablet 40 milliGRAM(s) Oral before breakfast  potassium phosphate / sodium phosphate Powder (PHOS-NaK) 1 Packet(s) Oral two times a day  valsartan 160 milliGRAM(s) Oral daily  venlafaxine 37.5 milliGRAM(s) Oral once    MEDICATIONS  (PRN):  acetaminophen     Tablet .. 650 milliGRAM(s) Oral every 6 hours PRN Temp greater or equal to 38C (100.4F), Mild Pain (1 - 3)  ALPRAZolam 0.5 milliGRAM(s) Oral two times a day PRN anxious  melatonin 3 milliGRAM(s) Oral at bedtime PRN Insomnia    ALLERGIES/INTOLERANCES:  Allergies  penicillin (Hives)    Intolerances    VITALS & EXAMINATION:  Vital Signs Last 24 Hrs  T(C): 37.2 (06 Jun 2022 11:57), Max: 37.2 (06 Jun 2022 11:57)  T(F): 98.9 (06 Jun 2022 11:57), Max: 98.9 (06 Jun 2022 11:57)  HR: 94 (06 Jun 2022 11:57) (80 - 94)  BP: 138/76 (06 Jun 2022 11:57) (131/72 - 138/76)  BP(mean): --  RR: 18 (06 Jun 2022 11:57) (17 - 18)  SpO2: 97% (06 Jun 2022 11:57) (97% - 98%)  General:  Constitutional: Obese Female, appears stated age, in no apparent distress including pain  Head: Normocephalic & atraumatic.  ENT: Patent ear canals, intact TM, mucus membranes moist & pink, neck supple, no lymphadenopathy.   Respiratory: Patent airway. All lung fields are clear to auscultation bilaterally.  Extremities: No cyanosis, clubbing, or edema.  Skin: No rashes, bruising, or discoloration.    Cardiovascular (>2): RRR no murmurs. Carotid pulsations symmetric, no bruits. Normal capillary beds refill, 1-2 seconds or less.     Neurological (>12):  MS: Awake, alert, oriented to person, place, situation, time. Normal affect. Follows all commands.    Language: Speech is clear, fluent with good repetition & comprehension (able to name objects___)    CNs: PERRLA (R = 3mm, L = 3mm). VFF. EOMI no nystagmus, no diplopia. V1-3 intact to LT/pinprick, well developed masseter muscles b/l. No facial asymmetry b/l, full eye closure strength b/l. Hearing grossly normal (rubbing fingers) b/l. Symmetric palate elevation in midline. Gag reflex deferred. Head turning & shoulder shrug intact b/l. Tongue midline, normal movements, no atrophy.    Fundoscopic: pale w/ sharp discs margins No vascular changes.      Motor: Normal muscle bulk & tone. No noticeable tremor or seizure. No pronator drift.              Deltoid	Biceps	Triceps	Wrist	Finger ABd	   R	5	5	5	5	5		5 	  L	5	5	5	5	5		5    	H-Flex	H-Ext	H-ABd	H-ADd	K-Flex	K-Ext	D-Flex	P-Flex  R	5	5	5	5	5	5	5	5 	   L	5	5	5	5	5	5	5	5	     Sensation: Intact to LT/PP/Temp/Vibration/Position b/l throughout.     Cortical: Extinction on DSS (neglect): none    Reflexes:              Biceps(C5)       BR(C6)     Triceps(C7)               Patellar(L4)    Achilles(S1)    Plantar Resp  R	2	          2	             2		        2		    2		Down   L	2	          2	             2		        2		    2		Down     Coordination: intact rapid-alt movements. No dysmetria to FTN/HTS    Gait: Normal Romberg. No postural instability. Normal stance and tandem gait.     LABORATORY:  CBC                       12.0   19.13 )-----------( 78       ( 06 Jun 2022 06:02 )             36.9     Chem 06-06    142  |  107  |  9   ----------------------------<  95  4.5   |  26  |  0.87    Ca    8.8      06 Jun 2022 06:02  Phos  2.4     06-06  Mg     1.60     06-06      STUDIES & IMAGING:  Studies (EKG, EEG, EMG, etc):     Radiology (XR, CT, MR, U/S, TTE/LUIS):    < from: CT Head No Cont (06.03.22 @ 23:41) >  FINDINGS:  VENTRICLES AND SULCI: Age-appropriate involutional change  INTRA-AXIAL: Microvascular ischemic changes age indeterminate  EXTRA-AXIAL:  No mass or collection is seen.  VISUALIZED SINUSES:  Clear.  VISUALIZED MASTOIDS:  Clear.  CALVARIUM:  Normal.  MISCELLANEOUS: Bilateral cataract surgery    IMPRESSION: Age-appropriate involutional change and microvascular   ischemic disease age indeterminate.    < end of copied text >   HPI:  This is a 78 y/o F with pmhx of recently diagnosed breast Ca on chemo first session 5/27/2022, anxiety, COPD, HTN, HLD, presented to the ED for new onset worsening confusion and vague symptoms. The patient is a poor historian, often forgetful, wants me to call ivette for information. However, the patient was complaining of new onset R sided face, ear and head pain that is dull in nature. Reports that it is intermittent and on/off. She initially did not notice this in the past however today noticed it. Denies focal deficits. Denies ear drainage. No photophobia.  I spoke with ivette at the phone number listed above. The patient was sent to the ED before she has been in bed all day today. She has been having intermittent forgetfulness for the last few months. She also has been having dizziness whenever she walks. She would go shopping with the roommate and then on the first aisle in the supermarket she would say that she would feel dizziness and wants to go home. After she received chemotherapy, the confusion, forgetfullness and symptoms of dizziness is worse. The patient would ask the same question multiple times. Of note, the roommate endorsed that she has been more upset and sad lately, saying that the patient misses her . She also has been getting into fights with one of her daughters that lives on the first floor downstairs. They found a "suicide note" in her notebook when she was going to the ED today. She also has been known to have anxiety.  Spoke to patient again, she denies suicidal or homicidal ideations. She also became teary, sad and anxious saying that she feels like that there is something wrong with her and she does not know what. She states she has not been feeling well since chemotherapy and is unable to elaborate further because she does not remember. (03 Jun 2022 23:01)    Neurology consulted for confusion. At baseline, pt lives at home with a roommates, ambulates without assistance or assistive devices, does all ADLs independently (ex. showering, etc).     Pt reports R post-auricular pain, 6/10, non-radiating.      REVIEW OF SYSTEMS    A 10-system ROS was performed and is negative except for those items noted above and/or in the HPI.    PAST MEDICAL & SURGICAL HISTORY:  COPD (chronic obstructive pulmonary disease) with emphysema      Anxiety and depression      Hypertension      HLD (hyperlipidemia)      LBBB (left bundle branch block)      Osteoarthritis      Memory loss      History of lumpectomy of left breast      Breast cancer, left      H/O colonoscopy      H/O tubal ligation      Fracture of toe of left foot  repair      S/P cataract surgery      History of lumpectomy of left breast        FAMILY HISTORY:  FHx: throat cancer (Sibling)    Family history of bladder cancer (Father)      SOCIAL HISTORY:   T/E/D:   Occupation:   Lives with:     MEDICATIONS (HOME):  Home Medications:  ALPRAZolam 0.5 mg oral tablet: 1 tab(s) orally once a day as needed (Last filled 3/24/22 x 3 day supply) (06 Jun 2022 07:28)  atorvastatin 20 mg oral tablet: 1 tab(s) orally once a day (06 Jun 2022 07:28)  Claritin 10 mg oral tablet: x 5days post treatment , prevent bone pain (04 Jun 2022 09:47)  Decadron: 4  orally days 2,3,4 post chemo (04 Jun 2022 09:47)  mirtazapine 45 mg oral tablet: 1 tab(s) orally once a day (at bedtime) (06 Jun 2022 07:28)  omeprazole 40 mg oral delayed release capsule: 1 cap(s) orally once a day (06 Jun 2022 07:28)  QUEtiapine 100 mg oral tablet: 1 tab(s) orally once a day (06 Jun 2022 07:28)  valsartan-hydrochlorothiazide 320 mg-12.5 mg oral tablet: 1 tab(s) orally once a day (06 Jun 2022 07:28)    MEDICATIONS  (STANDING):  atorvastatin 20 milliGRAM(s) Oral at bedtime  heparin   Injectable 5000 Unit(s) SubCutaneous every 12 hours  lactated ringers. 1000 milliLiter(s) (75 mL/Hr) IV Continuous <Continuous>  mirtazapine 45 milliGRAM(s) Oral at bedtime  pantoprazole    Tablet 40 milliGRAM(s) Oral before breakfast  potassium phosphate / sodium phosphate Powder (PHOS-NaK) 1 Packet(s) Oral two times a day  valsartan 160 milliGRAM(s) Oral daily  venlafaxine 37.5 milliGRAM(s) Oral once    MEDICATIONS  (PRN):  acetaminophen     Tablet .. 650 milliGRAM(s) Oral every 6 hours PRN Temp greater or equal to 38C (100.4F), Mild Pain (1 - 3)  ALPRAZolam 0.5 milliGRAM(s) Oral two times a day PRN anxious  melatonin 3 milliGRAM(s) Oral at bedtime PRN Insomnia    ALLERGIES/INTOLERANCES:  Allergies  penicillin (Hives)    Intolerances    VITALS & EXAMINATION:  Vital Signs Last 24 Hrs  T(C): 37.2 (06 Jun 2022 11:57), Max: 37.2 (06 Jun 2022 11:57)  T(F): 98.9 (06 Jun 2022 11:57), Max: 98.9 (06 Jun 2022 11:57)  HR: 94 (06 Jun 2022 11:57) (80 - 94)  BP: 138/76 (06 Jun 2022 11:57) (131/72 - 138/76)  BP(mean): --  RR: 18 (06 Jun 2022 11:57) (17 - 18)  SpO2: 97% (06 Jun 2022 11:57) (97% - 98%)  General:  Constitutional: Obese Female, appears stated age, in no apparent distress including pain  Head: Normocephalic & atraumatic.  ENT: Patent ear canals, intact TM, mucus membranes moist & pink, neck supple, no lymphadenopathy.   Respiratory: Patent airway. All lung fields are clear to auscultation bilaterally.  Extremities: No cyanosis, clubbing, or edema.  Skin: No rashes, bruising, or discoloration.    Cardiovascular (>2): RRR no murmurs. Carotid pulsations symmetric, no bruits. Normal capillary beds refill, 1-2 seconds or less.     Neurological (>12):  MS: Awake, alert, oriented to person, place, situation, time. Normal affect. Follows all commands.    Language: Speech is clear, fluent with good repetition & comprehension (able to name objects___)    CNs: PERRLA (R = 3mm, L = 3mm). VFF. EOMI no nystagmus, no diplopia. V1-3 intact to LT/pinprick, well developed masseter muscles b/l. No facial asymmetry b/l, full eye closure strength b/l. Hearing grossly normal (rubbing fingers) b/l. Symmetric palate elevation in midline. Gag reflex deferred. Head turning & shoulder shrug intact b/l. Tongue midline, normal movements, no atrophy.    Fundoscopic: pale w/ sharp discs margins No vascular changes.      Motor: Normal muscle bulk & tone. No noticeable tremor or seizure. No pronator drift.              Deltoid	Biceps	Triceps	Wrist	Finger ABd	   R	5	5	5	5	5		5 	  L	5	5	5	5	5		5    	H-Flex	H-Ext	H-ABd	H-ADd	K-Flex	K-Ext	D-Flex	P-Flex  R	5	5	5	5	5	5	5	5 	   L	5	5	5	5	5	5	5	5	     Sensation: Intact to LT/PP/Temp/Vibration/Position b/l throughout.     Cortical: Extinction on DSS (neglect): none    Reflexes:              Biceps(C5)       BR(C6)     Triceps(C7)               Patellar(L4)    Achilles(S1)    Plantar Resp  R	2	          2	             2		        2		    2		Down   L	2	          2	             2		        2		    2		Down     Coordination: intact rapid-alt movements. No dysmetria to FTN/HTS    Gait: Normal Romberg. No postural instability. Normal stance and tandem gait.     LABORATORY:  CBC                       12.0   19.13 )-----------( 78       ( 06 Jun 2022 06:02 )             36.9     Chem 06-06    142  |  107  |  9   ----------------------------<  95  4.5   |  26  |  0.87    Ca    8.8      06 Jun 2022 06:02  Phos  2.4     06-06  Mg     1.60     06-06      STUDIES & IMAGING:  Studies (EKG, EEG, EMG, etc):     Radiology (XR, CT, MR, U/S, TTE/LUIS):    < from: CT Head No Cont (06.03.22 @ 23:41) >  FINDINGS:  VENTRICLES AND SULCI: Age-appropriate involutional change  INTRA-AXIAL: Microvascular ischemic changes age indeterminate  EXTRA-AXIAL:  No mass or collection is seen.  VISUALIZED SINUSES:  Clear.  VISUALIZED MASTOIDS:  Clear.  CALVARIUM:  Normal.  MISCELLANEOUS: Bilateral cataract surgery    IMPRESSION: Age-appropriate involutional change and microvascular   ischemic disease age indeterminate.    < end of copied text >   HPI:  Per H&P: This is a 76 y/o F with pmhx of recently diagnosed breast Ca on chemo first session 5/27/2022, anxiety, COPD, HTN, HLD, presented to the ED for new onset worsening confusion and vague symptoms. The patient is a poor historian, often forgetful, wants me to call ivette for information. However, the patient was complaining of new onset R sided face, ear and head pain that is dull in nature. Reports that it is intermittent and on/off. She initially did not notice this in the past however today noticed it. Denies focal deficits. Denies ear drainage. No photophobia.  I spoke with ivette at the phone number listed above. The patient was sent to the ED before she has been in bed all day today. She has been having intermittent forgetfulness for the last few months. She also has been having dizziness whenever she walks. She would go shopping with the roommate and then on the first aisle in the supermarket she would say that she would feel dizziness and wants to go home. After she received chemotherapy, the confusion, forgetfullness and symptoms of dizziness is worse. The patient would ask the same question multiple times. Of note, the roommate endorsed that she has been more upset and sad lately, saying that the patient misses her . She also has been getting into fights with one of her daughters that lives on the first floor downstairs. They found a "suicide note" in her notebook when she was going to the ED today. She also has been known to have anxiety.  Spoke to patient again, she denies suicidal or homicidal ideations. She also became teary, sad and anxious saying that she feels like that there is something wrong with her and she does not know what. She states she has not been feeling well since chemotherapy and is unable to elaborate further because she does not remember. (03 Jun 2022 23:01)    Neurology consulted for confusion. Pt reports worsening memory issues (mostly anterograde amnesia) for the last 2 weeks, notably worse since her chemotherapy. Pt reports that she "feels bad" and her mood is low. Pt has been eating and sleeping normally. She is tearful on examination and easily agitated by the staff and by the loud music playing.  At baseline, Patient comes from home, lives with 2 roommates upstairs and daughter and son in law downstairs. Patient is  for 10 years, ambulates without assistance or assistive devices, does all ADLs independently (ex. showering, etc). Pt reports R post-auricular pain, 6/10, non-radiating.      REVIEW OF SYSTEMS    A 10-system ROS was performed and is negative except for those items noted above and/or in the HPI.    PAST MEDICAL & SURGICAL HISTORY:  COPD (chronic obstructive pulmonary disease) with emphysema      Anxiety and depression      Hypertension      HLD (hyperlipidemia)      LBBB (left bundle branch block)      Osteoarthritis      Memory loss      History of lumpectomy of left breast      Breast cancer, left      H/O colonoscopy      H/O tubal ligation      Fracture of toe of left foot  repair      S/P cataract surgery      History of lumpectomy of left breast        FAMILY HISTORY:  FHx: throat cancer (Sibling)    Family history of bladder cancer (Father)      SOCIAL HISTORY:   T/E/D:   Occupation:   Lives with:     MEDICATIONS (HOME):  Home Medications:  ALPRAZolam 0.5 mg oral tablet: 1 tab(s) orally once a day as needed (Last filled 3/24/22 x 3 day supply) (06 Jun 2022 07:28)  atorvastatin 20 mg oral tablet: 1 tab(s) orally once a day (06 Jun 2022 07:28)  Claritin 10 mg oral tablet: x 5days post treatment , prevent bone pain (04 Jun 2022 09:47)  Decadron: 4  orally days 2,3,4 post chemo (04 Jun 2022 09:47)  mirtazapine 45 mg oral tablet: 1 tab(s) orally once a day (at bedtime) (06 Jun 2022 07:28)  omeprazole 40 mg oral delayed release capsule: 1 cap(s) orally once a day (06 Jun 2022 07:28)  QUEtiapine 100 mg oral tablet: 1 tab(s) orally once a day (06 Jun 2022 07:28)  valsartan-hydrochlorothiazide 320 mg-12.5 mg oral tablet: 1 tab(s) orally once a day (06 Jun 2022 07:28)    MEDICATIONS  (STANDING):  atorvastatin 20 milliGRAM(s) Oral at bedtime  heparin   Injectable 5000 Unit(s) SubCutaneous every 12 hours  lactated ringers. 1000 milliLiter(s) (75 mL/Hr) IV Continuous <Continuous>  mirtazapine 45 milliGRAM(s) Oral at bedtime  pantoprazole    Tablet 40 milliGRAM(s) Oral before breakfast  potassium phosphate / sodium phosphate Powder (PHOS-NaK) 1 Packet(s) Oral two times a day  valsartan 160 milliGRAM(s) Oral daily  venlafaxine 37.5 milliGRAM(s) Oral once    MEDICATIONS  (PRN):  acetaminophen     Tablet .. 650 milliGRAM(s) Oral every 6 hours PRN Temp greater or equal to 38C (100.4F), Mild Pain (1 - 3)  ALPRAZolam 0.5 milliGRAM(s) Oral two times a day PRN anxious  melatonin 3 milliGRAM(s) Oral at bedtime PRN Insomnia    ALLERGIES/INTOLERANCES:  Allergies  penicillin (Hives)    Intolerances    VITALS & EXAMINATION:  Vital Signs Last 24 Hrs  T(C): 37.2 (06 Jun 2022 11:57), Max: 37.2 (06 Jun 2022 11:57)  T(F): 98.9 (06 Jun 2022 11:57), Max: 98.9 (06 Jun 2022 11:57)  HR: 94 (06 Jun 2022 11:57) (80 - 94)  BP: 138/76 (06 Jun 2022 11:57) (131/72 - 138/76)  BP(mean): --  RR: 18 (06 Jun 2022 11:57) (17 - 18)  SpO2: 97% (06 Jun 2022 11:57) (97% - 98%)    General:  Constitutional: Obese Female, appears stated age, in no apparent distress including pain  Head: Normocephalic & atraumatic.  Respiratory: No increased work of breathing  Extremities: No cyanosis, clubbing, or edema.  Skin: No rashes, bruising, or discoloration.    Neurological (>12):  MS: Awake, alert, oriented to person, place, situation, time. Tearful affect. Mildly uncooperative with exam and interview saying "just let me sleep". Follows all commands, including crossed and complex commands. Able to spell WORLD backwards and forwards. Able to say 7 quarters in $1.75. Registration 3/3, recall 2/3.     Language: Speech is clear, fluent with good repetition & comprehension (able to name objects mask, thumb)    CNs: PERRL (R = 3mm, L = 3mm). VFF. EOMI no nystagmus, no diplopia. V1-3 intact to LT, well developed masseter muscles b/l. No facial asymmetry b/l, full eye closure strength b/l. Hearing grossly normal (rubbing fingers) b/l. Gag reflex deferred. Head turning & shoulder shrug intact b/l. Tongue midline, normal movements, no atrophy.    Motor: Normal muscle bulk. No noticeable tremor or seizure. No pronator drift. Strength exam limited by cooperation.  strength 5/5, biceps 5/5, b/l LE no drift in 5 seconds, dorsiflexion 5/5 b/l     Sensation: Intact to LT b/l throughout.     Reflexes:              Biceps(C5)       BR(C6)     Triceps(C7)               Patellar(L4)    Achilles(S1)    Plantar Resp  R	2	          2	             2		        1		    0		withdraw  L	2	          2	             2		        1		    0		withdraw    Coordination: No dysmetria to FTN    Gait: deferred due to pt refusal but pt reports ambulating to bathroom without issues.    LABORATORY:  CBC                       12.0   19.13 )-----------( 78       ( 06 Jun 2022 06:02 )             36.9     Chem 06-06    142  |  107  |  9   ----------------------------<  95  4.5   |  26  |  0.87    Ca    8.8      06 Jun 2022 06:02  Phos  2.4     06-06  Mg     1.60     06-06      STUDIES & IMAGING:  Studies (EKG, EEG, EMG, etc):     Radiology (XR, CT, MR, U/S, TTE/LUIS):    < from: CT Head No Cont (06.03.22 @ 23:41) >  FINDINGS:  VENTRICLES AND SULCI: Age-appropriate involutional change  INTRA-AXIAL: Microvascular ischemic changes age indeterminate  EXTRA-AXIAL:  No mass or collection is seen.  VISUALIZED SINUSES:  Clear.  VISUALIZED MASTOIDS:  Clear.  CALVARIUM:  Normal.  MISCELLANEOUS: Bilateral cataract surgery    IMPRESSION: Age-appropriate involutional change and microvascular   ischemic disease age indeterminate.    < end of copied text >   HPI:  Per H&P: This is a 76 y/o F with pmhx of recently diagnosed breast Ca, s/p lumpectomy, on chemo first session 5/27/2022, anxiety, COPD, HTN, HLD, presented to the ED for new onset worsening confusion and vague symptoms. The patient is a poor historian, often forgetful, wants me to call ivette for information. However, the patient was complaining of new onset R sided face, ear and head pain that is dull in nature. Reports that it is intermittent and on/off. She initially did not notice this in the past however today noticed it. Denies focal deficits. Denies ear drainage. No photophobia.  I spoke with ivette at the phone number listed above. The patient was sent to the ED before she has been in bed all day today. She has been having intermittent forgetfulness for the last few months. She also has been having dizziness whenever she walks. She would go shopping with the roommate and then on the first aisle in the supermarket she would say that she would feel dizziness and wants to go home. After she received chemotherapy, the confusion, forgetfullness and symptoms of dizziness is worse. The patient would ask the same question multiple times. Of note, the roommate endorsed that she has been more upset and sad lately, saying that the patient misses her . She also has been getting into fights with one of her daughters that lives on the first floor downstairs. They found a "suicide note" in her notebook when she was going to the ED today. She also has been known to have anxiety.  Spoke to patient again, she denies suicidal or homicidal ideations. She also became teary, sad and anxious saying that she feels like that there is something wrong with her and she does not know what. She states she has not been feeling well since chemotherapy and is unable to elaborate further because she does not remember. (03 Jun 2022 23:01)    Neurology consulted for confusion. Pt reports worsening memory issues (mostly anterograde amnesia) for the last 2 weeks, notably worse since her chemotherapy. Pt reports that she "feels bad" and her mood is low. Pt has been eating and sleeping normally. She is tearful on examination and easily agitated by the staff and by the loud music playing.  At baseline, Patient comes from home, lives with 2 roommates upstairs and daughter and son in law downstairs. Patient is  for 10 years, ambulates without assistance or assistive devices, does all ADLs independently (ex. showering, etc). Pt reports R post-auricular pain, 6/10, non-radiating.      REVIEW OF SYSTEMS    A 10-system ROS was performed and is negative except for those items noted above and/or in the HPI.    PAST MEDICAL & SURGICAL HISTORY:  COPD (chronic obstructive pulmonary disease) with emphysema      Anxiety and depression      Hypertension      HLD (hyperlipidemia)      LBBB (left bundle branch block)      Osteoarthritis      Memory loss      History of lumpectomy of left breast      Breast cancer, left      H/O colonoscopy      H/O tubal ligation      Fracture of toe of left foot  repair      S/P cataract surgery      History of lumpectomy of left breast        FAMILY HISTORY:  FHx: throat cancer (Sibling)    Family history of bladder cancer (Father)      SOCIAL HISTORY:   T/E/D:   Occupation:   Lives with:     MEDICATIONS (HOME):  Home Medications:  ALPRAZolam 0.5 mg oral tablet: 1 tab(s) orally once a day as needed (Last filled 3/24/22 x 3 day supply) (06 Jun 2022 07:28)  atorvastatin 20 mg oral tablet: 1 tab(s) orally once a day (06 Jun 2022 07:28)  Claritin 10 mg oral tablet: x 5days post treatment , prevent bone pain (04 Jun 2022 09:47)  Decadron: 4  orally days 2,3,4 post chemo (04 Jun 2022 09:47)  mirtazapine 45 mg oral tablet: 1 tab(s) orally once a day (at bedtime) (06 Jun 2022 07:28)  omeprazole 40 mg oral delayed release capsule: 1 cap(s) orally once a day (06 Jun 2022 07:28)  QUEtiapine 100 mg oral tablet: 1 tab(s) orally once a day (06 Jun 2022 07:28)  valsartan-hydrochlorothiazide 320 mg-12.5 mg oral tablet: 1 tab(s) orally once a day (06 Jun 2022 07:28)    MEDICATIONS  (STANDING):  atorvastatin 20 milliGRAM(s) Oral at bedtime  heparin   Injectable 5000 Unit(s) SubCutaneous every 12 hours  lactated ringers. 1000 milliLiter(s) (75 mL/Hr) IV Continuous <Continuous>  mirtazapine 45 milliGRAM(s) Oral at bedtime  pantoprazole    Tablet 40 milliGRAM(s) Oral before breakfast  potassium phosphate / sodium phosphate Powder (PHOS-NaK) 1 Packet(s) Oral two times a day  valsartan 160 milliGRAM(s) Oral daily  venlafaxine 37.5 milliGRAM(s) Oral once    MEDICATIONS  (PRN):  acetaminophen     Tablet .. 650 milliGRAM(s) Oral every 6 hours PRN Temp greater or equal to 38C (100.4F), Mild Pain (1 - 3)  ALPRAZolam 0.5 milliGRAM(s) Oral two times a day PRN anxious  melatonin 3 milliGRAM(s) Oral at bedtime PRN Insomnia    ALLERGIES/INTOLERANCES:  Allergies  penicillin (Hives)    Intolerances    VITALS & EXAMINATION:  Vital Signs Last 24 Hrs  T(C): 37.2 (06 Jun 2022 11:57), Max: 37.2 (06 Jun 2022 11:57)  T(F): 98.9 (06 Jun 2022 11:57), Max: 98.9 (06 Jun 2022 11:57)  HR: 94 (06 Jun 2022 11:57) (80 - 94)  BP: 138/76 (06 Jun 2022 11:57) (131/72 - 138/76)  BP(mean): --  RR: 18 (06 Jun 2022 11:57) (17 - 18)  SpO2: 97% (06 Jun 2022 11:57) (97% - 98%)    General:  Constitutional: Obese Female, appears stated age, in no apparent distress including pain  Head: Normocephalic & atraumatic.  Respiratory: No increased work of breathing  Extremities: No cyanosis, clubbing, or edema.  Skin: No rashes, bruising, or discoloration.    Neurological (>12):  MS: Awake, alert, oriented to person, place, situation, time. Tearful affect. Mildly uncooperative with exam and interview saying "just let me sleep". Follows all commands, including crossed and complex commands. Able to spell WORLD backwards and forwards. Able to say 7 quarters in $1.75. Registration 3/3, recall 2/3.     Language: Speech is clear, fluent with good repetition & comprehension (able to name objects mask, thumb)    CNs: PERRL (R = 3mm, L = 3mm). VFF. EOMI no nystagmus, no diplopia. V1-3 intact to LT, well developed masseter muscles b/l. No facial asymmetry b/l, full eye closure strength b/l. Hearing grossly normal (rubbing fingers) b/l. Gag reflex deferred. Head turning & shoulder shrug intact b/l. Tongue midline, normal movements, no atrophy.    Motor: Normal muscle bulk. No noticeable tremor or seizure. No pronator drift. Strength exam limited by cooperation.  strength 5/5, biceps 5/5, b/l LE no drift in 5 seconds, dorsiflexion 5/5 b/l     Sensation: Intact to LT b/l throughout.     Reflexes:              Biceps(C5)       BR(C6)     Triceps(C7)               Patellar(L4)    Achilles(S1)    Plantar Resp  R	2	          2	             2		        1		    0		withdraw  L	2	          2	             2		        1		    0		withdraw    Coordination: No dysmetria to FTN    Gait: deferred due to pt refusal but pt reports ambulating to bathroom without issues.    LABORATORY:  CBC                       12.0   19.13 )-----------( 78       ( 06 Jun 2022 06:02 )             36.9     Chem 06-06    142  |  107  |  9   ----------------------------<  95  4.5   |  26  |  0.87    Ca    8.8      06 Jun 2022 06:02  Phos  2.4     06-06  Mg     1.60     06-06      STUDIES & IMAGING:  Studies (EKG, EEG, EMG, etc):     Radiology (XR, CT, MR, U/S, TTE/LUIS):    < from: CT Head No Cont (06.03.22 @ 23:41) >  FINDINGS:  VENTRICLES AND SULCI: Age-appropriate involutional change  INTRA-AXIAL: Microvascular ischemic changes age indeterminate  EXTRA-AXIAL:  No mass or collection is seen.  VISUALIZED SINUSES:  Clear.  VISUALIZED MASTOIDS:  Clear.  CALVARIUM:  Normal.  MISCELLANEOUS: Bilateral cataract surgery    IMPRESSION: Age-appropriate involutional change and microvascular   ischemic disease age indeterminate.    < end of copied text >

## 2022-06-06 NOTE — CONSULT NOTE ADULT - ASSESSMENT
Impression:    Recommendations:  [] check UA, TSH wnl, RPR, antithyroglobulin Ab, TPO Ab, vitamin B1, B6, B12 wnl, folate 9, homocysteine, methylmalonic acid, lactate, ammonia  []  [] psych consulted - appreciate recs  [] Delirium recommendations:  Environmental interventions can be helpful. Taking into account risks/benefits:  A. Minimize the use of medications, especially anticholinergics, antihistaminics, opiates, sedative hypnotics and benzodiazepines. Pain medications should be used at lowest effective dose.  B. Minimize procedures, use of bladder catheters and restraints to the best extent possible.  C. Encourage natural sleep at night, with minimal interruptions. Keep room dark and quiet.  Consider consolidation of neuro checks with vital signs and blood draws.  D. Encourage wakefulness during the day. Keep room well-lit  E. Encourage use of assistive devices such as glasses, hearing aids, and walkers  F. Have staff/family frequently re-orient patients  G. Advise family members that delirium often lingers for days or weeks after correction of metabolic impairments, acute renal or hepatic dysfunction, infections, surgical procedures, hospitalizations, administration of delirium-inducing medications including antibiotics.      Case to be seen and discussed with Dr. Preciado 77F w/ recently diagnosed breast Ca on chemo first session 5/27/2022, anxiety, COPD, HTN, HLD, presented to the ED for new onset worsening confusion and vague symptoms. Per collateral, she has been having intermittent forgetfulness for the last few months (patient would ask the same question multiple times), intermittent dizziness, and mood changes (upset and sad). She also has been getting into fights with one of her daughters that lives on the first floor downstairs. Neuro exam w/ AAOx3, 2/3 recall, intact attention, tearful affect.    Impression: progressive memory issues w/ anxious/depressed mood possibly 2/2 adjustment disorder, superimposed delirium     Recommendations:  [] check UA, TSH wnl, RPR, antithyroglobulin Ab, TPO Ab, vitamin B1, B6, B12 wnl, folate 9, homocysteine, methylmalonic acid, lactate, ammonia  [] psych consulted - appreciate recs  [] Delirium recommendations:  Environmental interventions can be helpful. Taking into account risks/benefits:  A. Minimize the use of medications, especially anticholinergics, antihistaminics, opiates, sedative hypnotics and benzodiazepines. Pain medications should be used at lowest effective dose.  B. Minimize procedures, use of bladder catheters and restraints to the best extent possible.  C. Encourage natural sleep at night, with minimal interruptions. Keep room dark and quiet.  Consider consolidation of neuro checks with vital signs and blood draws.  D. Encourage wakefulness during the day. Keep room well-lit  E. Encourage use of assistive devices such as glasses, hearing aids, and walkers  F. Have staff/family frequently re-orient patients  G. Advise family members that delirium often lingers for days or weeks after correction of metabolic impairments, acute renal or hepatic dysfunction, infections, surgical procedures, hospitalizations, administration of delirium-inducing medications including antibiotics.  [] outpatient neurology f/u at [address] [place] [address]      Case to be seen and discussed with Dr. Preciado 77F w/ recently diagnosed breast Ca on chemo first session 5/27/2022, anxiety, COPD, HTN, HLD, presented to the ED for new onset worsening confusion and vague symptoms. Per collateral, she has been having intermittent forgetfulness for the last few months (patient would ask the same question multiple times), intermittent dizziness, and mood changes (upset and sad). She also has been getting into fights with one of her daughters that lives on the first floor downstairs. Neuro exam w/ AAOx3, 2/3 recall, intact attention, tearful affect.    Impression: progressive memory issues w/ anxious/depressed mood possibly 2/2 adjustment disorder, superimposed delirium, toxic/metabolic/infectious encephalopathy    Recommendations:  [] MRI brain w/w/o  [] toxic/metabolic/infectious workup  [] check UA, TSH wnl, RPR, antithyroglobulin Ab, TPO Ab, vitamin B1, B6, B12 wnl, folate 9, homocysteine, methylmalonic acid, lactate, ammonia  [] psych consulted - appreciate recs  [] Delirium recommendations:  Environmental interventions can be helpful. Taking into account risks/benefits:  A. Minimize the use of medications, especially anticholinergics, antihistaminics, opiates, sedative hypnotics and benzodiazepines. Pain medications should be used at lowest effective dose.  B. Minimize procedures, use of bladder catheters and restraints to the best extent possible.  C. Encourage natural sleep at night, with minimal interruptions. Keep room dark and quiet.  Consider consolidation of neuro checks with vital signs and blood draws.  D. Encourage wakefulness during the day. Keep room well-lit  E. Encourage use of assistive devices such as glasses, hearing aids, and walkers  F. Have staff/family frequently re-orient patients  G. Advise family members that delirium often lingers for days or weeks after correction of metabolic impairments, acute renal or hepatic dysfunction, infections, surgical procedures, hospitalizations, administration of delirium-inducing medications including antibiotics.  [] outpatient neurology f/u at [address] [place] [address]      Case to be seen and discussed with Dr. Preciado

## 2022-06-06 NOTE — BH CONSULTATION LIAISON PROGRESS NOTE - CASE SUMMARY
78 y/o F, PPH of mood d/o in treatment with Dr. Mahajan though appears to  be currently noncomppliant. PMH of recently diagnosed breast CA on chemo first session 5/27/2022, COPD, HTN, HLD, presented to the ED for worsening confusion/anxiety. Patient appears to have notable anxiety, possible depression/hopelessness though difficult to ascertain given patient's alexithymia and amotivational state. Does poorly on MOCA which appears to be somewhat motivation related. Would make med changes as above as pseudodementia is a likely/possible dx. Would ensure that other dementia related phenomenon and brain mets are ruled out as above. No SI/HI currently but patient unlikely to be able to care for self, psych will follow.

## 2022-06-06 NOTE — PROGRESS NOTE ADULT - ASSESSMENT
76 y/o F with pmhx of recently diagnosed breast Ca on chemo first session 5/27/2022, anxiety, COPD, HTN, HLD, presented to the ED for new onset worsening confusion and vague symptoms. Labs show leukopenia and hypokalemia but otherwise wnl. Admit for work up of confusion and weakness.

## 2022-06-06 NOTE — CONSULT NOTE ADULT - CONSULT REASON
[FreeTextEntry1] : 76 F ASD repaired surgically IDDM atrial fibrillation s/p ablation by dr kramer  SDH ILR DVT/PE (possibly COVID related) on AC,  atrial tachycardia which manifested as sob controlled with BB here today for BP management she has no complaints\par \par \par \par normal nuclear stress test 6/2020\par 
confusion, memory loss
Recently diagnosed breast Cancer on chemo

## 2022-06-07 DIAGNOSIS — D72.829 ELEVATED WHITE BLOOD CELL COUNT, UNSPECIFIED: ICD-10-CM

## 2022-06-07 DIAGNOSIS — F41.9 ANXIETY DISORDER, UNSPECIFIED: ICD-10-CM

## 2022-06-07 LAB
ALBUMIN SERPL ELPH-MCNC: 2.6 G/DL — LOW (ref 3.3–5)
ALP SERPL-CCNC: 151 U/L — HIGH (ref 40–120)
ALT FLD-CCNC: 182 U/L — HIGH (ref 4–33)
ANION GAP SERPL CALC-SCNC: 9 MMOL/L — SIGNIFICANT CHANGE UP (ref 7–14)
AST SERPL-CCNC: 62 U/L — HIGH (ref 4–32)
BASOPHILS # BLD AUTO: 0.03 K/UL — SIGNIFICANT CHANGE UP (ref 0–0.2)
BASOPHILS NFR BLD AUTO: 0.2 % — SIGNIFICANT CHANGE UP (ref 0–2)
BILIRUB SERPL-MCNC: 0.3 MG/DL — SIGNIFICANT CHANGE UP (ref 0.2–1.2)
BUN SERPL-MCNC: 8 MG/DL — SIGNIFICANT CHANGE UP (ref 7–23)
CALCIUM SERPL-MCNC: 9 MG/DL — SIGNIFICANT CHANGE UP (ref 8.4–10.5)
CHLORIDE SERPL-SCNC: 106 MMOL/L — SIGNIFICANT CHANGE UP (ref 98–107)
CO2 SERPL-SCNC: 27 MMOL/L — SIGNIFICANT CHANGE UP (ref 22–31)
CREAT SERPL-MCNC: 0.87 MG/DL — SIGNIFICANT CHANGE UP (ref 0.5–1.3)
EGFR: 69 ML/MIN/1.73M2 — SIGNIFICANT CHANGE UP
EOSINOPHIL # BLD AUTO: 0.17 K/UL — SIGNIFICANT CHANGE UP (ref 0–0.5)
EOSINOPHIL NFR BLD AUTO: 1.1 % — SIGNIFICANT CHANGE UP (ref 0–6)
GLUCOSE SERPL-MCNC: 107 MG/DL — HIGH (ref 70–99)
HCT VFR BLD CALC: 36.4 % — SIGNIFICANT CHANGE UP (ref 34.5–45)
HCYS SERPL-MCNC: 15.1 UMOL/L — HIGH
HGB BLD-MCNC: 11.6 G/DL — SIGNIFICANT CHANGE UP (ref 11.5–15.5)
IANC: 10.54 K/UL — HIGH (ref 1.8–7.4)
IMM GRANULOCYTES NFR BLD AUTO: 8.2 % — HIGH (ref 0–1.5)
LACTATE BLDV-MCNC: 1.6 MMOL/L — SIGNIFICANT CHANGE UP (ref 0.5–2)
LDH SERPL L TO P-CCNC: 254 U/L — HIGH (ref 135–225)
LYMPHOCYTES # BLD AUTO: 1.94 K/UL — SIGNIFICANT CHANGE UP (ref 1–3.3)
LYMPHOCYTES # BLD AUTO: 12.7 % — LOW (ref 13–44)
MAGNESIUM SERPL-MCNC: 1.5 MG/DL — LOW (ref 1.6–2.6)
MCHC RBC-ENTMCNC: 28.4 PG — SIGNIFICANT CHANGE UP (ref 27–34)
MCHC RBC-ENTMCNC: 31.9 GM/DL — LOW (ref 32–36)
MCV RBC AUTO: 89.2 FL — SIGNIFICANT CHANGE UP (ref 80–100)
MONOCYTES # BLD AUTO: 1.37 K/UL — HIGH (ref 0–0.9)
MONOCYTES NFR BLD AUTO: 9 % — SIGNIFICANT CHANGE UP (ref 2–14)
NEUTROPHILS # BLD AUTO: 10.54 K/UL — HIGH (ref 1.8–7.4)
NEUTROPHILS NFR BLD AUTO: 68.8 % — SIGNIFICANT CHANGE UP (ref 43–77)
NRBC # BLD: 0 /100 WBCS — SIGNIFICANT CHANGE UP
NRBC # FLD: 0.06 K/UL — HIGH
PHOSPHATE SERPL-MCNC: 3.5 MG/DL — SIGNIFICANT CHANGE UP (ref 2.5–4.5)
PLATELET # BLD AUTO: 72 K/UL — LOW (ref 150–400)
POTASSIUM SERPL-MCNC: 4.1 MMOL/L — SIGNIFICANT CHANGE UP (ref 3.5–5.3)
POTASSIUM SERPL-SCNC: 4.1 MMOL/L — SIGNIFICANT CHANGE UP (ref 3.5–5.3)
PROT SERPL-MCNC: 4.4 G/DL — LOW (ref 6–8.3)
RBC # BLD: 4.08 M/UL — SIGNIFICANT CHANGE UP (ref 3.8–5.2)
RBC # FLD: 14.7 % — HIGH (ref 10.3–14.5)
SODIUM SERPL-SCNC: 142 MMOL/L — SIGNIFICANT CHANGE UP (ref 135–145)
T PALLIDUM AB TITR SER: NEGATIVE — SIGNIFICANT CHANGE UP
URATE SERPL-MCNC: 5.5 MG/DL — SIGNIFICANT CHANGE UP (ref 2.5–7)
WBC # BLD: 15.3 K/UL — HIGH (ref 3.8–10.5)
WBC # FLD AUTO: 15.3 K/UL — HIGH (ref 3.8–10.5)

## 2022-06-07 PROCEDURE — 99233 SBSQ HOSP IP/OBS HIGH 50: CPT

## 2022-06-07 PROCEDURE — 99232 SBSQ HOSP IP/OBS MODERATE 35: CPT

## 2022-06-07 PROCEDURE — 99222 1ST HOSP IP/OBS MODERATE 55: CPT

## 2022-06-07 RX ORDER — MAGNESIUM SULFATE 500 MG/ML
2 VIAL (ML) INJECTION ONCE
Refills: 0 | Status: COMPLETED | OUTPATIENT
Start: 2022-06-07 | End: 2022-06-07

## 2022-06-07 RX ADMIN — Medication 1 PACKET(S): at 05:46

## 2022-06-07 RX ADMIN — SODIUM CHLORIDE 75 MILLILITER(S): 9 INJECTION, SOLUTION INTRAVENOUS at 21:23

## 2022-06-07 RX ADMIN — SODIUM CHLORIDE 75 MILLILITER(S): 9 INJECTION, SOLUTION INTRAVENOUS at 18:27

## 2022-06-07 RX ADMIN — QUETIAPINE FUMARATE 100 MILLIGRAM(S): 200 TABLET, FILM COATED ORAL at 21:24

## 2022-06-07 RX ADMIN — MIRTAZAPINE 45 MILLIGRAM(S): 45 TABLET, ORALLY DISINTEGRATING ORAL at 21:24

## 2022-06-07 RX ADMIN — PANTOPRAZOLE SODIUM 40 MILLIGRAM(S): 20 TABLET, DELAYED RELEASE ORAL at 05:45

## 2022-06-07 RX ADMIN — Medication 37.5 MILLIGRAM(S): at 18:28

## 2022-06-07 RX ADMIN — VALSARTAN 160 MILLIGRAM(S): 80 TABLET ORAL at 05:45

## 2022-06-07 RX ADMIN — Medication 25 GRAM(S): at 08:18

## 2022-06-07 RX ADMIN — SODIUM CHLORIDE 75 MILLILITER(S): 9 INJECTION, SOLUTION INTRAVENOUS at 05:47

## 2022-06-07 RX ADMIN — HEPARIN SODIUM 5000 UNIT(S): 5000 INJECTION INTRAVENOUS; SUBCUTANEOUS at 05:45

## 2022-06-07 RX ADMIN — HEPARIN SODIUM 5000 UNIT(S): 5000 INJECTION INTRAVENOUS; SUBCUTANEOUS at 18:28

## 2022-06-07 RX ADMIN — ATORVASTATIN CALCIUM 20 MILLIGRAM(S): 80 TABLET, FILM COATED ORAL at 21:24

## 2022-06-07 NOTE — PROGRESS NOTE ADULT - ASSESSMENT
78 yo F with recently diagnosed breast Ca on chemo with CMF +Onpro, s/p cycle 1 on 5/27/2022, and h/o anxiety, COPD, HTN, HLD, presented to the ED for new onset worsening confusion and dizziness    -Confusion may be due to recent chemo with steroids as well as underlying dementia  -Patient started on Remeron and Seroquel and will monitor  -No evidence infection  -CT scan head unremarkable  -MRI brain pending  -Leukopenia has resolved, s/p Neulasta with chemo  -Thrombocytopenia likely due to chemo  -Continue supportive care including nutritional support   -DVT ppx   -No plan for inpatient chemo  -She will follow up with her primary oncologist Dr. Marilyn Jain at [facility] following discharge

## 2022-06-07 NOTE — BH CONSULTATION LIAISON PROGRESS NOTE - NSBHASSESSMENTFT_PSY_ALL_CORE
Patient is a 78 y/o  F, living in her own house with daughter, with pmhx of recently diagnosed breast Ca on chemo first session 5/27/2022, anxiety, COPD, HTN, HLD, presented to the ED for new onset worsening confusion and vague symptoms. Labs show leukocytosis (leukopenia on admission 6/3).     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 Patient scored 20/30 on MoCA but on observation patient is less likely to answer questions that require more effort. After starting Remeron, patient is observed to be more animated and awake. Patient should be treated for dementia and undergo full dementia workup and workup for brain mets given active cancer.    PLAN  - patient with no SI or HI, no hx of SA, no psychosis, help seeking   - CONTINUE home medications  -- continue Remeron 45mg qhs  -- Seroquel 100mg qhs ** if qtc < 500  - START Effexor XR 37.5 PO AM  ---- antipsychotics can only be given if qtc < 500   - Can make xanax 0.5mg a PRN as patient does not use this daily at home -> can give xanax 0.5mg BID PRN  ---(istop Reference #: [id number])  - labs: b12, folate, tsh  - consider neurology consult  - given risk of brain mets consider MRI with/without contrast   - psych will follow re: dispo   Patient is a 78 y/o  F, living in her own house with daughter, with pmhx of recently diagnosed breast Ca on chemo first session 5/27/2022, anxiety, COPD, HTN, HLD, presented to the ED for new onset worsening confusion and vague symptoms. Labs show leukocytosis (leukopenia on admission 6/3).     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 Patient scored 20/30 on MoCA but on observation patient is less likely to answer questions that require more effort. After starting Remeron, patient is observed to be more animated and awake. Patient should be treated for dementia and undergo full dementia workup and workup for brain mets given active cancer.    6/7 - improvement in mood, tolerating effexor, still has cognitive deficits in short term memory though attention improved     PLAN  - patient with no SI or HI, no hx of SA, no psychosis, help seeking   - CONTINUE home medications  -- continue Remeron 45mg qhs  -- INCREASE Seroquel to 125mg qhs ** if qtc < 500  - CONTINUE Effexor XR 37.5 PO AM  ---- antipsychotics can only be given if qtc < 500   - Can make xanax 0.5mg a PRN as patient does not use this daily at home -> can give xanax 0.5mg BID PRN  ---(istop Reference #: [id number])  - labs: b12, folate, tsh  - neuro c/s appreciated   - given risk of brain mets consider MRI with/without contrast   - likely to go home but may benefit from HHA or other help at home, should f/u with Dr. Mahajan or [facility] geriatric psych [phone]

## 2022-06-07 NOTE — PROGRESS NOTE ADULT - PROBLEM SELECTOR PLAN 9
- heparin subq for dvt ppx  Dispo; PT: home; no skilled PT needs Pt initially with leukopenia. now with leukocytosis with bandemia. Afebrile. No signs of infection. potentially secondary to medication - patient received decadron from chemotherapy   - Blood culture and urine culture unremarkable   - Continue to monitor and watch off abx.

## 2022-06-07 NOTE — BH CONSULTATION LIAISON PROGRESS NOTE - NSBHFUPINTERVALHXFT_PSY_A_CORE
Patient is more talkative today. She mentions that she felt extremely anxious before bed last night but was able to get enough sleep except for an incident where she was "bleeding a lot and the nurses would not tell me what was going on." The patient is frustrated with her hospital stay, states that no one is telling her what is going on or results of her tests, and that she does not want to say anything bad about the hospital because people with "antagonize her." When asked what concerns her the patient mentions that she had an aunt that got dementia and got "locked up in a psychiatric facility." The patient states that she does not know what is causing the symptoms, and states that she has had memory issues before chemotherapy started. Patient has low energy    Patient seems more alert and awake t Patient is more talkative today. She mentions that she felt extremely anxious before bed last night but was able to get enough sleep except for an incident where she was "bleeding a lot and the nurses would not tell me what was going on." The patient is frustrated with her hospital stay, states that no one is telling her what is going on or results of her tests, and that she does not want to say anything bad about the hospital because people with "antagonize her." When asked what concerns her the patient mentions that she had an aunt that got dementia and got "locked up in a psychiatric facility." The patient states that she does not know what is causing the symptoms, and states that she has had memory issues before chemotherapy started. Patient has low energy but states normal sleep, no sad mood, no SI, HI, auditory and visual hallucinations, delusions, paranoia. Patient answers "I don't know" for many of questions asked of her, including what today's year was. Patient was able to identify place, and date though.     Collateral was taken from daughter (Deborah [phone])  Daughter is a 60 year old female who lives below her mother in their double family home. The patient lives with two roommates and the daughter lives below with her own family. Daughter states that the memory issues have been going on for 3 months now. Patient always responds with " I don't know" and daughter does not believe that she is acting out this time, as the patient has a history of frequently calling ambulances when she feels "sick". Daughter describes patient as a hypochondriac and says the patient wants to call an ambulance twice a day     Patient is more talkative today. She mentions that she felt extremely anxious before bed last night but was able to get enough sleep except for an incident where she was "bleeding a lot and the nurses would not tell me what was going on." The patient is frustrated with her hospital stay, states that no one is telling her what is going on or results of her tests, and that she does not want to say anything bad about the hospital because people with "antagonize her." When asked what concerns her the patient mentions that she had an aunt that got dementia and got "locked up in a psychiatric facility." The patient states that she does not know what is causing the symptoms, and states that she has had memory issues before chemotherapy started. Patient has low energy but states normal sleep, no sad mood, no SI, HI, auditory and visual hallucinations, delusions, paranoia. Patient answers "I don't know" for many of questions asked of her, including what today's year was. Patient was able to identify place, and date though.     Collateral was taken from daughter (Deborah [phone])  Daughter is a 60 year old female who lives below her mother in their double family home. The patient lives with two roommates and the daughter lives below with her own family. Daughter states that the memory issues have been going on for 3 months now. She describes the patient saying "I don't know" more often and it has been a gradual change. Daughter states that this has been a hard time for them - both mom and daughter have cancer right now. Daughter got operation two days ago and hasn't been able to see the mom in the hospital.  Daughter describes patient as a hypochondriac and says the patient wants to call an ambulance twice a month when she gets dizzy, heart palpitations, and panic attacks. Daughter also mentioned that patient UTIs and says that might be the cause of delirium. The daughter describes the mom as someone who wants to be taken care of, a person who doesn't leave the house for days, doesn't get dressed for days, and wont take her medication unless the roommate gives it to her. She has not seen her mom be more depressed than usual, but states that her mom has lots of anxiety. After patient's lumpectomy on 3/2022 the patient was transferred to [facility] for altered mental status and lots of anxiety. For this the patient was given Seroquel and another mood stabilizer. The daughter states that the anxiety was bad before the cancer was diagnosed and that the patient is scared to die. When asked if patient can take care of herself without support, the daughter said she might but she chooses not to and needs support. The patient also used to be able to take care of the house and keep it in "pristine" condition but now struggles even opening her mail. The daughter has now had to hire someone to keep the house clean. The patient has never forgotten where she has been, why she is in the house but does often repeat questions and often states that she is loosing her mind. The daughter denies knowing about any hallucinations/paranoia/delusions. Family history includes patient's mother, aunt and grandmother who had dementia.

## 2022-06-07 NOTE — PROGRESS NOTE ADULT - PROBLEM SELECTOR PLAN 2
Assessment:  - patient presents for worsening confusion and forgetfulness after chemotherapy.  Intermittent period of confusion, improving: possible underlying dementia as reversible causes of dementia unremarkable  Bcx NTD, ucx   - Apprec Psych rec:  TSH: wnl  B12: elevated , folate: wnl; f/u  RPR, antithyroglobulin Ab, TPO Ab, vitamin B1, B6,  - Procal neg   - CXR neg, CT head unremarkable   - F/u  Remeron 45mg qhs, Seroquel 100mg qhs   - Apprec hem/onc rec: no chemo inpatient,    - Neurology consulted, appreciate recs Will f/u  MRI brain - ordered on 6/7

## 2022-06-07 NOTE — PROGRESS NOTE ADULT - PROBLEM SELECTOR PLAN 8
- Orthostatics neg   - At home on valsartan-HTZ 320mg-12.5mg   - Renal function wnl. continue  Valsartan at 160mg and titrate up if needed, will add HTZ if further BP control needed - heparin subq for dvt ppx  Dispo; PT: home; no skilled PT needs

## 2022-06-07 NOTE — PROGRESS NOTE ADULT - PROBLEM SELECTOR PLAN 5
- Improved   - patient presented with new onset leukopenia after chemotherapy  - RVP neg   - Also thrombocytopenia, no overt s&S bleeding, likely in setting of recent chemo  - trend labs   - infectious work up as above - heme/onc consulted: no plan for chemo as inpatient; pt to f/u with her med oncologist Marilyn Putnam at [facility] after d/c

## 2022-06-07 NOTE — PROGRESS NOTE ADULT - SUBJECTIVE AND OBJECTIVE BOX
[facility]  Division of Hospital Medicine  Becky Napoles MD  Pager: [phone]      Patient is a 77y old  Female who presents with a chief complaint of confusion (06 Jun 2022 13:38)      SUBJECTIVE / OVERNIGHT EVENTS: Patient examined at bedside. Denies any pain. Aox 3 - hospital and June 2022. Discussed plan for MRI. Pt denies any metal denies feeling claustrophobic.   ADDITIONAL REVIEW OF SYSTEMS:    MEDICATIONS  (STANDING):  atorvastatin 20 milliGRAM(s) Oral at bedtime  heparin   Injectable 5000 Unit(s) SubCutaneous every 12 hours  lactated ringers. 1000 milliLiter(s) (75 mL/Hr) IV Continuous <Continuous>  mirtazapine 45 milliGRAM(s) Oral at bedtime  pantoprazole    Tablet 40 milliGRAM(s) Oral before breakfast  QUEtiapine 100 milliGRAM(s) Oral at bedtime  valsartan 160 milliGRAM(s) Oral daily  venlafaxine XR. 37.5 milliGRAM(s) Oral daily    MEDICATIONS  (PRN):  acetaminophen     Tablet .. 650 milliGRAM(s) Oral every 6 hours PRN Temp greater or equal to 38C (100.4F), Mild Pain (1 - 3)  ALPRAZolam 0.5 milliGRAM(s) Oral two times a day PRN anxious  melatonin 3 milliGRAM(s) Oral at bedtime PRN Insomnia      CAPILLARY BLOOD GLUCOSE        I&O's Summary      PHYSICAL EXAM:  Vital Signs Last 24 Hrs  T(C): 36.8 (07 Jun 2022 05:40), Max: 36.9 (06 Jun 2022 17:50)  T(F): 98.2 (07 Jun 2022 05:40), Max: 98.4 (06 Jun 2022 17:50)  HR: 93 (06 Jun 2022 21:27) (93 - 97)  BP: 132/75 (07 Jun 2022 05:40) (132/75 - 147/84)  BP(mean): --  RR: 18 (07 Jun 2022 05:40) (17 - 18)  SpO2: 97% (07 Jun 2022 05:40) (94% - 98%)  CONSTITUTIONAL: Elderly woman in NAD, well-developed, well-groomed  RESPIRATORY: Normal respiratory effort; lungs are clear to auscultation bilaterally  CARDIOVASCULAR: Regular rate and rhythm, normal S1 and S2, no murmur/rub/gallop; No lower extremity edema; Peripheral pulses are 2+ bilaterally  ABDOMEN: Nontender to palpation, normoactive bowel sounds, no rebound/guarding; No hepatosplenomegaly  PSYCH: A+O to person, place, and time; - J and June 2022  affect appropriate  NEUROLOGY:  no gross sensory deficits   SKIN: No rashes; no palpable lesions      LABS:                        11.6   15.30 )-----------( 72       ( 07 Jun 2022 05:30 )             36.4     06-07    142  |  106  |  8   ----------------------------<  107<H>  4.1   |  27  |  0.87    Ca    9.0      07 Jun 2022 05:30  Phos  3.5     06-07  Mg     1.50     06-07    TPro  4.4<L>  /  Alb  2.6<L>  /  TBili  0.3  /  DBili  x   /  AST  62<H>  /  ALT  182<H>  /  AlkPhos  151<H>  06-07                RADIOLOGY & ADDITIONAL TESTS:  Results Reviewed:   Imaging Personally Reviewed:  Electrocardiogram Personally Reviewed:    COORDINATION OF CARE:  Care Discussed with Consultants/Other Providers [Y/N]:  Prior or Outpatient Records Reviewed [Y/N]:

## 2022-06-07 NOTE — PROGRESS NOTE ADULT - PROBLEM SELECTOR PLAN 7
- PT rec: home, no skilled PT home - Orthostatics neg   - At home on valsartan-HTZ 320mg-12.5mg   - Renal function wnl. continue  Valsartan at 160mg and titrate up if needed, will add HTZ if further BP control needed

## 2022-06-07 NOTE — PROGRESS NOTE ADULT - ASSESSMENT
76 y/o F with pmhx of recently diagnosed breast Ca on chemo first session 5/27/2022, anxiety, COPD, HTN, HLD, presented to the ED for new onset worsening confusion and vague symptoms. Labs show leukopenia and hypokalemia but otherwise wnl. Admit for work up of confusion and weakness, concerning for dementia vs. toxic/metabolic/infectious encephalopathy. Pt awaiting MRI brain

## 2022-06-07 NOTE — PROGRESS NOTE ADULT - SUBJECTIVE AND OBJECTIVE BOX
INTERVAL HPI/OVERNIGHT EVENTS:  Patient S&E at bedside. No o/n events. She continues to be very anxious.    VITAL SIGNS:  T(F): 98.2 (06-07-22 @ 05:40)  HR: 93 (06-06-22 @ 21:27)  BP: 132/75 (06-07-22 @ 05:40)  RR: 18 (06-07-22 @ 05:40)  SpO2: 97% (06-07-22 @ 05:40)  Wt(kg): --    PHYSICAL EXAM:    Constitutional: NAD  Eyes: EOMI, sclera non-icteric  Neck: supple, no masses, no JVD  Respiratory: CTA b/l, good air entry b/l  Cardiovascular: RRR, no M/R/G  Gastrointestinal: soft, NTND, no masses palpable, + BS, no hepatosplenomegaly  Extremities: no c/c/e  Neurological: AAOx3      MEDICATIONS  (STANDING):  atorvastatin 20 milliGRAM(s) Oral at bedtime  heparin   Injectable 5000 Unit(s) SubCutaneous every 12 hours  lactated ringers. 1000 milliLiter(s) (75 mL/Hr) IV Continuous <Continuous>  mirtazapine 45 milliGRAM(s) Oral at bedtime  pantoprazole    Tablet 40 milliGRAM(s) Oral before breakfast  QUEtiapine 100 milliGRAM(s) Oral at bedtime  valsartan 160 milliGRAM(s) Oral daily  venlafaxine XR. 37.5 milliGRAM(s) Oral daily    MEDICATIONS  (PRN):  acetaminophen     Tablet .. 650 milliGRAM(s) Oral every 6 hours PRN Temp greater or equal to 38C (100.4F), Mild Pain (1 - 3)  ALPRAZolam 0.5 milliGRAM(s) Oral two times a day PRN anxious  melatonin 3 milliGRAM(s) Oral at bedtime PRN Insomnia      Allergies    penicillin (Hives)    Intolerances        LABS:                        11.6   15.30 )-----------( 72       ( 07 Jun 2022 05:30 )             36.4     06-07    142  |  106  |  8   ----------------------------<  107<H>  4.1   |  27  |  0.87    Ca    9.0      07 Jun 2022 05:30  Phos  3.5     06-07  Mg     1.50     06-07    TPro  4.4<L>  /  Alb  2.6<L>  /  TBili  0.3  /  DBili  x   /  AST  62<H>  /  ALT  182<H>  /  AlkPhos  151<H>  06-07          RADIOLOGY & ADDITIONAL TESTS:  Studies reviewed.   INTERVAL HPI/OVERNIGHT EVENTS:  Patient S&E at bedside. No o/n events. She continues to be very anxious.    VITAL SIGNS:  T(F): 98.2 (06-07-22 @ 05:40)  HR: 93 (06-06-22 @ 21:27)  BP: 132/75 (06-07-22 @ 05:40)  RR: 18 (06-07-22 @ 05:40)  SpO2: 97% (06-07-22 @ 05:40)  Wt(kg): --    PHYSICAL EXAM:    Constitutional: NAD  Eyes: EOMI, sclera non-icteric  Neck: supple, no masses, no JVD  Respiratory: CTA b/l, good air entry b/l  Cardiovascular: RRR, no M/R/G  Gastrointestinal: soft, NTND, no masses palpable, + BS, no hepatosplenomegaly  Extremities: no c/c/e  Neurological: AAOx3      MEDICATIONS  (STANDING):  atorvastatin 20 milliGRAM(s) Oral at bedtime  heparin   Injectable 5000 Unit(s) SubCutaneous every 12 hours  lactated ringers. 1000 milliLiter(s) (75 mL/Hr) IV Continuous <Continuous>  mirtazapine 45 milliGRAM(s) Oral at bedtime  pantoprazole    Tablet 40 milliGRAM(s) Oral before breakfast  QUEtiapine 100 milliGRAM(s) Oral at bedtime  valsartan 160 milliGRAM(s) Oral daily  venlafaxine XR. 37.5 milliGRAM(s) Oral daily    MEDICATIONS  (PRN):  acetaminophen     Tablet .. 650 milliGRAM(s) Oral every 6 hours PRN Temp greater or equal to 38C (100.4F), Mild Pain (1 - 3)  ALPRAZolam 0.5 milliGRAM(s) Oral two times a day PRN anxious  melatonin 3 milliGRAM(s) Oral at bedtime PRN Insomnia      Allergies    penicillin (Hives)    Intolerances        LABS:                        11.6   15.30 )-----------( 72       ( 07 Jun 2022 05:30 )             36.4     06-07    142  |  106  |  8   ----------------------------<  107<H>  4.1   |  27  |  0.87    Ca    9.0      07 Jun 2022 05:30  Phos  3.5     06-07  Mg     1.50     06-07    TPro  4.4<L>  /  Alb  2.6<L>  /  TBili  0.3  /  DBili  x   /  AST  62<H>  /  ALT  182<H>  /  AlkPhos  151<H>  06-07          RADIOLOGY & ADDITIONAL TESTS:      ACC: [id number] EXAM:  CT BRAIN                          PROCEDURE DATE:  06/03/2022          INTERPRETATION:  INDICATIONS:  Alteration of  Consciousness    TECHNIQUE:  Serial axial images were obtained from the skull base to the   vertex without intravenous contrast. Sagittal and Coronal reformats were   performed    COMPARISON EXAMINATION: None.    FINDINGS:  VENTRICLES AND SULCI: Age-appropriate involutional change  INTRA-AXIAL: Microvascular ischemic changes age indeterminate  EXTRA-AXIAL:  No mass or collection is seen.  VISUALIZED SINUSES:  Clear.  VISUALIZED MASTOIDS:  Clear.  CALVARIUM:  Normal.  MISCELLANEOUS: Bilateral cataract surgery    IMPRESSION: Age-appropriate involutional change and microvascular   ischemic disease age indeterminate.        DULCE MARIA GONZALEZ MD; Attending Radiologist  This document has been electronically signed. Jun 4 2022  8:35AM

## 2022-06-08 PROBLEM — I44.7 LBBB (LEFT BUNDLE BRANCH BLOCK): Status: ACTIVE | Noted: 2020-08-13

## 2022-06-08 LAB
ANION GAP SERPL CALC-SCNC: 9 MMOL/L — SIGNIFICANT CHANGE UP (ref 7–14)
BUN SERPL-MCNC: 7 MG/DL — SIGNIFICANT CHANGE UP (ref 7–23)
CALCIUM SERPL-MCNC: 8.6 MG/DL — SIGNIFICANT CHANGE UP (ref 8.4–10.5)
CHLORIDE SERPL-SCNC: 108 MMOL/L — HIGH (ref 98–107)
CO2 SERPL-SCNC: 26 MMOL/L — SIGNIFICANT CHANGE UP (ref 22–31)
CREAT SERPL-MCNC: 0.78 MG/DL — SIGNIFICANT CHANGE UP (ref 0.5–1.3)
EGFR: 78 ML/MIN/1.73M2 — SIGNIFICANT CHANGE UP
GLUCOSE SERPL-MCNC: 102 MG/DL — HIGH (ref 70–99)
HCT VFR BLD CALC: 33.7 % — LOW (ref 34.5–45)
HGB BLD-MCNC: 10.9 G/DL — LOW (ref 11.5–15.5)
MAGNESIUM SERPL-MCNC: 1.8 MG/DL — SIGNIFICANT CHANGE UP (ref 1.6–2.6)
MCHC RBC-ENTMCNC: 28.1 PG — SIGNIFICANT CHANGE UP (ref 27–34)
MCHC RBC-ENTMCNC: 32.3 GM/DL — SIGNIFICANT CHANGE UP (ref 32–36)
MCV RBC AUTO: 86.9 FL — SIGNIFICANT CHANGE UP (ref 80–100)
NRBC # BLD: 0 /100 WBCS — SIGNIFICANT CHANGE UP
NRBC # FLD: 0.04 K/UL — HIGH
PHOSPHATE SERPL-MCNC: 3.3 MG/DL — SIGNIFICANT CHANGE UP (ref 2.5–4.5)
PLATELET # BLD AUTO: 88 K/UL — LOW (ref 150–400)
POTASSIUM SERPL-MCNC: 4 MMOL/L — SIGNIFICANT CHANGE UP (ref 3.5–5.3)
POTASSIUM SERPL-SCNC: 4 MMOL/L — SIGNIFICANT CHANGE UP (ref 3.5–5.3)
RBC # BLD: 3.88 M/UL — SIGNIFICANT CHANGE UP (ref 3.8–5.2)
RBC # FLD: 15.1 % — HIGH (ref 10.3–14.5)
SODIUM SERPL-SCNC: 143 MMOL/L — SIGNIFICANT CHANGE UP (ref 135–145)
THYROPEROXIDASE AB SERPL-ACNC: <10 IU/ML — SIGNIFICANT CHANGE UP
WBC # BLD: 11.92 K/UL — HIGH (ref 3.8–10.5)
WBC # FLD AUTO: 11.92 K/UL — HIGH (ref 3.8–10.5)

## 2022-06-08 PROCEDURE — 99232 SBSQ HOSP IP/OBS MODERATE 35: CPT

## 2022-06-08 RX ORDER — QUETIAPINE FUMARATE 200 MG/1
125 TABLET, FILM COATED ORAL AT BEDTIME
Refills: 0 | Status: DISCONTINUED | OUTPATIENT
Start: 2022-06-08 | End: 2022-06-10

## 2022-06-08 RX ORDER — VENLAFAXINE HCL 75 MG
75 CAPSULE, EXT RELEASE 24 HR ORAL DAILY
Refills: 0 | Status: DISCONTINUED | OUTPATIENT
Start: 2022-06-09 | End: 2022-06-10

## 2022-06-08 RX ADMIN — SODIUM CHLORIDE 75 MILLILITER(S): 9 INJECTION, SOLUTION INTRAVENOUS at 20:29

## 2022-06-08 RX ADMIN — SODIUM CHLORIDE 75 MILLILITER(S): 9 INJECTION, SOLUTION INTRAVENOUS at 12:28

## 2022-06-08 RX ADMIN — MIRTAZAPINE 45 MILLIGRAM(S): 45 TABLET, ORALLY DISINTEGRATING ORAL at 21:24

## 2022-06-08 RX ADMIN — Medication 3 MILLIGRAM(S): at 21:24

## 2022-06-08 RX ADMIN — ATORVASTATIN CALCIUM 20 MILLIGRAM(S): 80 TABLET, FILM COATED ORAL at 21:24

## 2022-06-08 RX ADMIN — VALSARTAN 160 MILLIGRAM(S): 80 TABLET ORAL at 05:59

## 2022-06-08 RX ADMIN — HEPARIN SODIUM 5000 UNIT(S): 5000 INJECTION INTRAVENOUS; SUBCUTANEOUS at 17:25

## 2022-06-08 RX ADMIN — Medication 0.5 MILLIGRAM(S): at 23:05

## 2022-06-08 RX ADMIN — PANTOPRAZOLE SODIUM 40 MILLIGRAM(S): 20 TABLET, DELAYED RELEASE ORAL at 05:59

## 2022-06-08 RX ADMIN — Medication 37.5 MILLIGRAM(S): at 12:24

## 2022-06-08 RX ADMIN — HEPARIN SODIUM 5000 UNIT(S): 5000 INJECTION INTRAVENOUS; SUBCUTANEOUS at 05:59

## 2022-06-08 NOTE — BH CONSULTATION LIAISON PROGRESS NOTE - CASE SUMMARY
patient more alert, open and animated but still very depressed, anxious, denies SI/HI but does not want chemo because she feels her anxiety precludes her from tolerating it and taking care of herself, family concurs that patient has not been able to do basic ADLs, would increase meds as above for depression. May need [facility] inpt admission given severity of illness and debility.

## 2022-06-08 NOTE — PROGRESS NOTE ADULT - SUBJECTIVE AND OBJECTIVE BOX
[facility]  Division of Hospital Medicine  Becky Napoles MD  Pager: [phone]      Patient is a 77y old  Female who presents with a chief complaint of confusion (07 Jun 2022 12:37)      SUBJECTIVE / OVERNIGHT EVENTS: Pt awaiting MRI brain. Reports discomfort on external vaginal area. per discussion with PCA, noted to have erythema and redness. Denies dysuria. otherwise. No medical concerns;   ADDITIONAL REVIEW OF SYSTEMS:    MEDICATIONS  (STANDING):  atorvastatin 20 milliGRAM(s) Oral at bedtime  heparin   Injectable 5000 Unit(s) SubCutaneous every 12 hours  lactated ringers. 1000 milliLiter(s) (75 mL/Hr) IV Continuous <Continuous>  mirtazapine 45 milliGRAM(s) Oral at bedtime  pantoprazole    Tablet 40 milliGRAM(s) Oral before breakfast  QUEtiapine 125 milliGRAM(s) Oral at bedtime  valsartan 160 milliGRAM(s) Oral daily  venlafaxine XR. 37.5 milliGRAM(s) Oral daily    MEDICATIONS  (PRN):  acetaminophen     Tablet .. 650 milliGRAM(s) Oral every 6 hours PRN Temp greater or equal to 38C (100.4F), Mild Pain (1 - 3)  ALPRAZolam 0.5 milliGRAM(s) Oral two times a day PRN anxious  melatonin 3 milliGRAM(s) Oral at bedtime PRN Insomnia      CAPILLARY BLOOD GLUCOSE        I&O's Summary      PHYSICAL EXAM:  Vital Signs Last 24 Hrs  T(C): 37.3 (08 Jun 2022 12:57), Max: 37.3 (08 Jun 2022 12:57)  T(F): 99.2 (08 Jun 2022 12:57), Max: 99.2 (08 Jun 2022 12:57)  HR: 90 (08 Jun 2022 12:57) (87 - 91)  BP: 160/74 (08 Jun 2022 12:57) (134/79 - 160/74)  BP(mean): --  RR: 18 (08 Jun 2022 12:57) (18 - 18)  SpO2: 95% (08 Jun 2022 12:57) (95% - 97%)  CONSTITUTIONAL: Elderly woman in NAD, well-developed, well-groomed  RESPIRATORY: Normal respiratory effort; lungs are clear to auscultation bilaterally  CARDIOVASCULAR: Regular rate and rhythm, normal S1 and S2, no murmur/rub/gallop; No lower extremity edema; Peripheral pulses are 2+ bilaterally  ABDOMEN: Nontender to palpation, normoactive bowel sounds, no rebound/guarding; No hepatosplenomegaly  PSYCH: A+O to person, place, and time; - [facility] and June 2022  affect appropriate  NEUROLOGY:  no gross sensory deficits   SKIN: No rashes; no palpable lesions    LABS:                        10.9   11.92 )-----------( 88       ( 08 Jun 2022 06:05 )             33.7     06-08    143  |  108<H>  |  7   ----------------------------<  102<H>  4.0   |  26  |  0.78    Ca    8.6      08 Jun 2022 06:05  Phos  3.3     06-08  Mg     1.80     06-08    TPro  4.4<L>  /  Alb  2.6<L>  /  TBili  0.3  /  DBili  x   /  AST  62<H>  /  ALT  182<H>  /  AlkPhos  151<H>  06-07                RADIOLOGY & ADDITIONAL TESTS:  Results Reviewed:   Imaging Personally Reviewed:  Electrocardiogram Personally Reviewed:    COORDINATION OF CARE:  Care Discussed with Consultants/Other Providers [Y/N]:  Prior or Outpatient Records Reviewed [Y/N]:

## 2022-06-08 NOTE — BH CONSULTATION LIAISON PROGRESS NOTE - NSBHCHARTREVIEWLAB_PSY_A_CORE FT
Leukocytosis 
Patient has leukocytosis, anemic, decreased platelets, increased neutrophils, hyperchloremia, elevated glucose, decreased protein albumin, increased alk phos, increased AST/ALT, decreased magnesium, increased lactate and increased homocysteine

## 2022-06-08 NOTE — PROGRESS NOTE ADULT - PROBLEM SELECTOR PLAN 9
Pt initially with leukopenia. now with leukocytosis with bandemia. Afebrile. No signs of infection.  secondary to medication - patient received decadron from chemotherapy and neulasta   - Blood culture and urine culture unremarkable   - Continue to monitor and watch off abx.

## 2022-06-08 NOTE — HISTORY OF PRESENT ILLNESS
[de-identified] : Ms. Olga Womack is a 77 year old female here for an evaluation of breast cancer. Her oncologic history is as follows:\par \par She underwent routine breast imaging on 01/25/2022 (BI-RADS 0)  which showed possible irregular shaped mass with associated distortion lower inner left breast middle to posterior third which likely correlates with an irregular shaped hypoechoic nodule with associated acoustic shadowing left breast 9:00 6 cm from nipple on screening breast ultrasound. for which additional imaging was rec. Additional left breast imaging on 02/03/2022 (BI-RADS 5) showed a 7 mm irregular mass in the left breast at the 9:00 position for which ultrasound-guided core biopsy is advised.\par \par  She underwent  left breast 9 o'clock 6cmfn ultrasound guided biopsy core on 2/9/2022 which showed invasive moderately differentiated ductal carcinoma, [place] score 6/9, measuring 0.5 cm ER +, 70%, PgR Negative,HER-2 Negative. Microcalcifications and focal ADH and  columnar cell change noted.\par \par She underwent a breast MRI on 02/21/2022 (BI-RADS 6) which showed  a 5 mm enhancing mass in the left inner breast, recently diagnosed left breast malignancy.\par No other suspicious enhancing masses. \par \par She underwent left breast lumpectomy: on  3/7/2022 \par 3/07/2022 L lumpectomy, L SLNB\par  - IDC, G2, 7 mm\par  - DCIS, intermediate grade, at least 1 mm (3/28 slides)\par  - Margins negative\par  - SLNB 0/7\par  - ER 70%, AL 0%, HER2 negative\par  - [id number], AJCC Stage IA\par \par Initial consult 4/4/22- AI prescribed\par ODX reported 4/13/22: RS 36\par D/w pt over the phone 4/14/22- rec to come for visit\par \par She is here today 4/25/22 to discuss ODX. \par She doesn’t know why she is here today\par Pt reports she is forgetful and has no recollection of ODX discussion or visit ith me 3 weeks ago\par She is having major anxiety and reports " acting crazy" at home. She lives with friends Dianne and Deidre\par She is here with friend- Shania. Shania reports pt is very forgetful lately. Was here last time with friend Dianne but she doesn’t remember \par She says that she thought she was going to see someone for "acting crazy" \par She is tearful and reports " she cant think straight". She dosent remember the name of her psychiatrist or when she was seen last. \par Denies SI or HI \par After a lot of redirecting, she wanted to discuss cancer treatment option. We discussed CMF chemo but she is not interested. pt needs to be evaluated by psychiatrist first to control anxiety issues and then have better discussion re risks/benefits of chemo. Given her age, comorbidities and uncontrolled anxiety- may not be able to tolerate anything more than AI but will need a discussion about her options when she is able to comprehend risks/benefits. \par \par addendum 4/25: Pt discharged from ER w/o psyche intervention\par addendum 4/26: referred to see Dr Marie urgently\par addendum 4/27: Dr Marie rec to see her primary psychiatrist, Dr Mahajan\par addendum 4/28- spoke to pt. She says she is alone and doesn’t know how to place call to make appt. She says Her mind isnt working.\par  My office conference her in with Dr Mahajan office. She declined an earlier appt and wanted to keep appt for next month. \par \par We will schedule office visit in 1-2 weeks to f/u \par Continue AI in the meantine [de-identified] : In summary, Ms. 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 She is status post lumpectomy and ALNbx 3/2022. ODX: RS 36 She has h/o severe anxiety and depression.\par \par 5/13/22: She is here today with friend Ranjit. They live together and he is well aware of her medical conditions\par She is in better mood today. Says she is here today because she has an appt to discuss cancer treatment. Not tearful or anxious TODAY, able to have a conversation\par Ranjit had RT for prostate cancer and is very supportive \par She is f/b psychiatrist for anxiety/depression. f/b Dr Mahajan \par We discussed CMF chemo and RT due to high risk ODX\par \par I discussed the expected and unexpected side effects of chemotherapy, including but not limited to hair loss, fatigue, change in taste, mouth sores, nausea, vomiting, diarrhea, infusion reaction, allergic reaction, significant myelosuppression, need for blood transfusion, infection, bleeding, cardiac toxicity, neuropathy, chemical cystitis, kidney injury, nerve pain, muscle pain and detrimental effect on liver, lung and heart function. I also discussed a small but potential risk of development of acute leukemia with the use of cyclophosphamide therapy.\par \par The patient understood all the potential side effects and signed an informed consent after discussing the risks, benefits and alternatives.\par Chemo consent obtained \par Patient is willing to do chemo. Concerned about transportation.  called\par  Holding AI while on chemo\par Case was presented in the TB last week. Consensus was to give CMF chemo f/b RT\par Will refer for RT after chemo\par \par Here to start CMF + onpro #1 today 5/27/22 Counts good \par Chemo s/e discussed,\par She reports being very forgetful . I reminded her to HOLD anastrozole during Chemo  \par Medication for symptom management reviewed and questions answered\par EKG done 5/13/22 she is noted to have left bundle branch block and will f/u with cardio oncology , \par Patient is a anxious about stating chemo today\par Emotional support given, Written instructions given \par \par

## 2022-06-08 NOTE — BH CONSULTATION LIAISON PROGRESS NOTE - NSBHASSESSMENTFT_PSY_ALL_CORE
Patient is a 78 y/o  F, living in her own house with daughter, with pmhx of recently diagnosed breast Ca on chemo first session 5/27/2022, anxiety, COPD, HTN, HLD, presented to the ED for new onset worsening confusion and vague symptoms. Labs show leukocytosis (leukopenia on admission 6/3).     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 Patient scored 20/30 on MoCA (6/6)  After starting Remeron, patient is observed to be more animated and awake. Patient should be treated for dementia and undergo full dementia workup and workup for brain mets given active cancer.    6/7 - improvement in mood, tolerating effexor, still has cognitive deficits in short term memory though attention improved     6/7- improvement in affect, toleration effexor, attention improved,  On observation patient used to answer more questions with "I don't know" but as time is going on patient has been answering less questions. Patient is aware of cognitive/memory decline.     PLAN  - patient with no SI or HI, no hx of SA, no psychosis, help seeking   - CONTINUE home medications  -- continue Remeron 45mg qhs  -- INCREASE Seroquel to 125mg qhs ** if qtc < 500  - CONTINUE Effexor XR 37.5 PO AM  ---- antipsychotics can only be given if qtc < 500   - Can make xanax 0.5mg a PRN as patient does not use this daily at home -> can give xanax 0.5mg BID PRN  ---(istop Reference #: [id number])  - labs: b12, folate, tsh  - neuro c/s appreciated   - given risk of brain mets consider MRI with/without contrast   - patient has increased LFTs, check for ammonia levels in the setting of dementia   - likely to go home but may benefit from HHA or other help at home, should f/u with Dr. Mahajan or [facility] geriatric psych [phone]     Patient is a 78 y/o  F, living in her own house with daughter, with pmhx of recently diagnosed breast Ca on chemo first session 5/27/2022, anxiety, COPD, HTN, HLD, presented to the ED for new onset worsening confusion and vague symptoms. Labs show leukocytosis (leukopenia on admission 6/3).     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 Patient scored 20/30 on MoCA (6/6)  After starting Remeron, patient is observed to be more animated and awake. Patient should be treated for dementia and undergo full dementia workup and workup for brain mets given active cancer.    6/7 - improvement in mood, tolerating effexor, still has cognitive deficits in short term memory though attention improved     6/7- improvement in affect, toleration effexor, attention improved,  On observation patient used to answer more questions with "I don't know" but as time is going on patient has been answering less questions. Patient is aware of cognitive/memory decline.     PLAN  - patient with no SI or HI, no hx of SA, no psychosis, help seeking   - CONTINUE home medications  -- continue Remeron 45mg qhs  -- INCREASE Seroquel to 125mg qhs ** if qtc < 500  - CONTINUE Effexor XR 37.5 PO AM  ---- antipsychotics can only be given if qtc < 500   - Can make xanax 0.5mg a PRN as patient does not use this daily at home -> can give xanax 0.5mg BID PRN  ---(istop Reference #: [id number])  - labs: b12, folate, tsh  - neuro c/s appreciated   - given risk of brain mets consider MRI with/without contrast   - patient has increased LFTs, check for ammonia levels in the setting of dementia   - likely to go home but may benefit from HHA or impatient [facility] geriatric psych, should f/u with Dr. Mahajan or [facility] geriatric psych [phone]     Patient is a 76 y/o  F, living in her own house with daughter, with pmhx of recently diagnosed breast Ca on chemo first session 5/27/2022, anxiety, COPD, HTN, HLD, presented to the ED for new onset worsening confusion and vague symptoms. Labs show leukocytosis (leukopenia on admission 6/3).     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 Patient scored 20/30 on MoCA (6/6)  After starting Remeron, patient is observed to be more animated and awake. Patient should be treated for dementia and undergo full dementia workup and workup for brain mets given active cancer.    6/7 - improvement in mood, tolerating effexor, still has cognitive deficits in short term memory though attention improved     6/8- improvement in affect, tolerating effexor, attention improved,  On observation patient used to answer more questions with "I don't know" but as time is going on patient has been answering less questions with that response. Patient is aware of cognitive/memory decline.     PLAN  - patient with no SI or HI, no hx of SA, no psychosis, help seeking   - CONTINUE home medications  -- continue Remeron 45mg qhs  -- INCREASE Seroquel to 125mg qhs ** if qtc < 500  - INCREASE Effexor XR 75 PO AM  ---- antipsychotics can only be given if qtc < 500   - Can make xanax 0.5mg a PRN as patient does not use this daily at home -> can give xanax 0.5mg BID PRN  ---(istop Reference #: [id number])  - labs: b12, folate, tsh  - neuro c/s appreciated   - given risk of brain mets consider MRI with/without contrast   - Patient should be either sent home with [facility] or inpatient [facility] geriatrics. Patient needs more observation to make decision on which option is better for overall health   Patient is a 78 y/o  F, living in her own house with daughter, with pmhx of recently diagnosed breast Ca on chemo first session 5/27/2022, anxiety, COPD, HTN, HLD, presented to the ED for new onset worsening confusion and vague symptoms. Labs show leukocytosis (leukopenia on admission 6/3).     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 Patient scored 20/30 on MoCA (6/6)  After starting Remeron, patient is observed to be more animated and awake. Patient should be treated for dementia and undergo full dementia workup and workup for brain mets given active cancer.    6/7 - improvement in mood, tolerating effexor, still has cognitive deficits in short term memory though attention improved     6/8- improvement in affect, tolerating effexor, attention improved,  On observation patient used to answer more questions with "I don't know" but as time is going on patient has been answering less questions with that response. Patient is aware of cognitive/memory decline. Still very anxious, depressed    PLAN  - patient with no SI or HI, no hx of SA, no psychosis, help seeking   - CONTINUE home medications  -- continue Remeron 45mg qhs  -- INCREASE Seroquel to 125mg qhs ** if qtc < 500  - INCREASE Effexor XR 75 PO AM  ---- antipsychotics can only be given if qtc < 500   - Can make xanax 0.5mg a PRN as patient does not use this daily at home -> can give xanax 0.5mg BID PRN  ---(istop Reference #: [id number])  - labs: b12, folate, tsh  - neuro c/s appreciated   - given risk of brain mets consider MRI with/without contrast   - may need [facility] admission as patient's depression/anxiety is effecting judgement, cognition, and ability to tolerate chemo

## 2022-06-08 NOTE — PHYSICAL EXAM
[Restricted in physically strenuous activity but ambulatory and able to carry out work of a light or sedentary nature] : Status 1- Restricted in physically strenuous activity but ambulatory and able to carry out work of a light or sedentary nature, e.g., light house work, office work [Normal] : normoactive bowel sounds, soft and nontender, no hepatosplenomegaly or masses appreciated [de-identified] : healing left breast lumpectomy and axillary scar

## 2022-06-08 NOTE — BH CONSULTATION LIAISON PROGRESS NOTE - NSBHCONSULTRECOMMENDOTHER_PSY_A_CORE FT
check ammonia levels in the background of elevated ALT/AST check ammonia levels in the background of elevated ALT/AST. Patient should be considered for inpatient [facility] as she could benefit from geriatric inpatient therapy Patient should be considered for inpatient [facility] as she could benefit from geriatric inpatient therapy

## 2022-06-08 NOTE — REASON FOR VISIT
[Follow-Up Visit] : a follow-up [Friend] : friend [Other: _____] : [unfilled] [FreeTextEntry2] : BREAST CANCER

## 2022-06-08 NOTE — PHYSICAL EXAM
[Restricted in physically strenuous activity but ambulatory and able to carry out work of a light or sedentary nature] : Status 1- Restricted in physically strenuous activity but ambulatory and able to carry out work of a light or sedentary nature, e.g., light house work, office work [Normal] : normoactive bowel sounds, soft and nontender, no hepatosplenomegaly or masses appreciated [de-identified] : healing left breast lumpectomy and axillary scar

## 2022-06-08 NOTE — PHYSICAL EXAM
[Restricted in physically strenuous activity but ambulatory and able to carry out work of a light or sedentary nature] : Status 1- Restricted in physically strenuous activity but ambulatory and able to carry out work of a light or sedentary nature, e.g., light house work, office work [Normal] : normoactive bowel sounds, soft and nontender, no hepatosplenomegaly or masses appreciated [de-identified] : healing left breast lumpectomy and axillary scar

## 2022-06-08 NOTE — ASSESSMENT
[FreeTextEntry1] : In summary, Ms. MARVIN CHISHOLM is a 77 year old postmenopausal female with stage IA (T1b, N0, Mx) ER positive, [place] negative, HER-2/susan negative invasive ductal carcinoma of the left breast. She is status post lumpectomy and ALNbx 3/2022. She has h/o severe anxiety and depression. ODX reported 4/13/22: RS 36\par \par 5/13/22: She is here today with friend Ranjit. They live together and he is well aware of her medical conditions\par She is in better mood today. Says she is here today because she has an appt to discuss cancer treatment. Not tearful or anxious TODAY, able to have a conversation. Ranjit had RT for prostate cancer and is very supportive \par She is f/b psychiatrist for anxiety/depression. f/b Dr Mahajan \par \par We discussed CMF chemo and RT due to high risk ODX\par \par I discussed the expected and unexpected side effects of chemotherapy, including but not limited to hair loss, fatigue, change in taste, mouth sores, nausea, vomiting, diarrhea, infusion reaction, allergic reaction, significant myelosuppression, need for blood transfusion, infection, bleeding, cardiac toxicity, neuropathy, chemical cystitis, kidney injury, nerve pain, muscle pain and detrimental effect on liver, lung and heart function. I also discussed a small but potential risk of development of acute leukemia with the use of cyclophosphamide therapy.\par \par The patient understood all the potential side effects and signed an informed consent after discussing the risks, benefits and alternatives.\par Patient is willing to do chemo. Concerned about transportation.  called\par Case was presented in the TB last week. Consensus was to give CMF chemo f/b RT\par \par \par - Breast ca: Starting DDcmf CHEMO #1/8 today 5/27/22 S/E REVIEWED IN DETAIL. Premeds reviewed. \par She is noted to have left bundle branch block on 5/12/22 EKG, No CP or SOB will f/u with cardio oncology \par - Chemo induced N/V- Will get premedications with Emend, dexamethasone and Aloxi. She will continue dexamethasone for next 3 days for antinausea prophylaxis. She will use omeprazole daily. IV fluids prn.\par -Memory loss/ forgetfulness- She reports forgetfulness most likely 2/2 anxiety. reminded her to hold anastrozole during chemo\par - GF induced bone pain- She will take Claritin. \par - Chemo induced dysgeusia and fatigue: Encourage p.o. fluids, small frequent meals \par - CANdidate for Endocrine therapy in the form of AI  x 10 years She started anastrozole on 4/4/2022 but was instructed to HOLD AI while on chemo. Will refer for RT after chemo\par - Instructed to call office and go directly to the emergency room with fever more than 100.4, shaking chills, productive cough, sore throat, shortness of breath or urinary symptoms. Patient verbalized understanding and agreement.\par - Emotional support provided, all questions answered.\par \par RTO 2 weeks. for MD/NP and tx. \par  \par  Appointments made until 7/8/22 and schedule reviewed with patient. Patient was instructed to make additional appointments prn with checkout desk prior to leaving [facility].\par \par \par \par

## 2022-06-08 NOTE — HISTORY OF PRESENT ILLNESS
[de-identified] : Ms. Olga Womack is a 77 year old female here for an evaluation of breast cancer. Her oncologic history is as follows:\par \par She underwent routine breast imaging on 01/25/2022 (BI-RADS 0)  which showed possible irregular shaped mass with associated distortion lower inner left breast middle to posterior third which likely correlates with an irregular shaped hypoechoic nodule with associated acoustic shadowing left breast 9:00 6 cm from nipple on screening breast ultrasound. for which additional imaging was rec. Additional left breast imaging on 02/03/2022 (BI-RADS 5) showed a 7 mm irregular mass in the left breast at the 9:00 position for which ultrasound-guided core biopsy is advised.\par \par  She underwent  left breast 9 o'clock 6cmfn ultrasound guided biopsy core on 2/9/2022 which showed invasive moderately differentiated ductal carcinoma, [place] score 6/9, measuring 0.5 cm ER +, 70%, PgR Negative,HER-2 Negative. Microcalcifications and focal ADH and  columnar cell change noted.\par \par She underwent a breast MRI on 02/21/2022 (BI-RADS 6) which showed  a 5 mm enhancing mass in the left inner breast, recently diagnosed left breast malignancy.\par No other suspicious enhancing masses. \par \par She underwent left breast lumpectomy: on  3/7/2022 \par 3/07/2022 L lumpectomy, L SLNB\par  - IDC, G2, 7 mm\par  - DCIS, intermediate grade, at least 1 mm (3/28 slides)\par  - Margins negative\par  - SLNB 0/7\par  - ER 70%, TX 0%, HER2 negative\par  - [id number], AJCC Stage IA\par \par Initial consult 4/4/22- AI prescribed\par ODX reported 4/13/22: RS 36\par D/w pt over the phone 4/14/22- rec to come for visit\par \par She is here today 4/25/22 to discuss ODX. \par She doesn’t know why she is here today\par Pt reports she is forgetful and has no recollection of ODX discussion or visit ith me 3 weeks ago\par She is having major anxiety and reports " acting crazy" at home. She lives with friends Dianne and Deidre\par She is here with friend- Shania. Shania reports pt is very forgetful lately. Was here last time with friend Dianne but she doesn’t remember \par She says that she thought she was going to see someone for "acting crazy" \par She is tearful and reports " she cant think straight". She dosent remember the name of her psychiatrist or when she was seen last. \par Denies SI or HI \par After a lot of redirecting, she wanted to discuss cancer treatment option. We discussed CMF chemo but she is not interested. pt needs to be evaluated by psychiatrist first to control anxiety issues and then have better discussion re risks/benefits of chemo. Given her age, comorbidities and uncontrolled anxiety- may not be able to tolerate anything more than AI but will need a discussion about her options when she is able to comprehend risks/benefits. \par \par addendum 4/25: Pt discharged from ER w/o psyche intervention\par addendum 4/26: referred to see Dr Marie urgently\par addendum 4/27: Dr Marie rec to see her primary psychiatrist, Dr Mahajan\par addendum 4/28- spoke to pt. She says she is alone and doesn’t know how to place call to make appt. She says Her mind isnt working.\par  My office conference her in with Dr Mahajan office. She declined an earlier appt and wanted to keep appt for next month. \par \par We will schedule office visit in 1-2 weeks to f/u \par Continue AI in the meantine [de-identified] : In summary, Ms. MARVIN CHISHOLM is a 77 year old postmenopausal female with stage IA (T1b, N0, Mx) ER positive, DC negative, HER-2/susan negative invasive ductal carcinoma of the left breast. She is status post lumpectomy and ALNbx 3/2022. ODX: RS 36 She has h/o severe anxiety and depression.\par \par 5/13/22: She is here today with friend Ranjit. They live together and he is well aware of her medical conditions\par She is in better mood today. Says she is here today because she has an appt to discuss cancer treatment. Not tearful or anxious TODAY, able to have a conversation\par Ranjit had RT for prostate cancer and is very supportive \par She is f/b psychiatrist for anxiety/depression. f/b Dr Mahajan \par We discussed CMF chemo and RT due to high risk ODX\par \par I discussed the expected and unexpected side effects of chemotherapy, including but not limited to hair loss, fatigue, change in taste, mouth sores, nausea, vomiting, diarrhea, infusion reaction, allergic reaction, significant myelosuppression, need for blood transfusion, infection, bleeding, cardiac toxicity, neuropathy, chemical cystitis, kidney injury, nerve pain, muscle pain and detrimental effect on liver, lung and heart function. I also discussed a small but potential risk of development of acute leukemia with the use of cyclophosphamide therapy.\par \par The patient understood all the potential side effects and signed an informed consent after discussing the risks, benefits and alternatives.\par Chemo consent obtained \par Patient is willing to do chemo. Concerned about transportation.  called\par  Holding AI while on chemo\par Case was presented in the TB last week. Consensus was to give CMF chemo f/b RT\par Will refer for RT after chemo\par \par Here to start CMF + onpro #1 today 5/27/22 Counts good \par Chemo s/e discussed,\par She reports being very forgetful . I reminded her to HOLD anastrozole during Chemo  \par Medication for symptom management reviewed and questions answered\par EKG done 5/13/22 she is noted to have left bundle branch block and will f/u with cardio oncology , \par Patient is a anxious about stating chemo today\par Emotional support given, Written instructions given \par \par

## 2022-06-08 NOTE — HISTORY OF PRESENT ILLNESS
[de-identified] : Ms. Olga Womack is a 77 year old female here for an evaluation of breast cancer. Her oncologic history is as follows:\par \par She underwent routine breast imaging on 01/25/2022 (BI-RADS 0)  which showed possible irregular shaped mass with associated distortion lower inner left breast middle to posterior third which likely correlates with an irregular shaped hypoechoic nodule with associated acoustic shadowing left breast 9:00 6 cm from nipple on screening breast ultrasound. for which additional imaging was rec. Additional left breast imaging on 02/03/2022 (BI-RADS 5) showed a 7 mm irregular mass in the left breast at the 9:00 position for which ultrasound-guided core biopsy is advised.\par \par  She underwent  left breast 9 o'clock 6cmfn ultrasound guided biopsy core on 2/9/2022 which showed invasive moderately differentiated ductal carcinoma, Rogers score 6/9, measuring 0.5 cm ER +, 70%, PgR Negative,HER-2 Negative. Microcalcifications and focal ADH and  columnar cell change noted.\par \par She underwent a breast MRI on 02/21/2022 (BI-RADS 6) which showed  a 5 mm enhancing mass in the left inner breast, recently diagnosed left breast malignancy.\par No other suspicious enhancing masses. \par \par She underwent left breast lumpectomy: on  3/7/2022 \par 3/07/2022 L lumpectomy, L SLNB\par  - IDC, G2, 7 mm\par  - DCIS, intermediate grade, at least 1 mm (3/28 slides)\par  - Margins negative\par  - SLNB 0/7\par  - ER 70%, SD 0%, HER2 negative\par  - [id number], AJCC Stage IA\par \par Initial consult 4/4/22- AI prescribed\par ODX reported 4/13/22: RS 36\par D/w pt over the phone 4/14/22- rec to come for visit\par \par She is here today 4/25/22 to discuss ODX. \par She doesn’t know why she is here today\par Pt reports she is forgetful and has no recollection of ODX discussion or visit ith me 3 weeks ago\par She is having major anxiety and reports " acting crazy" at home. She lives with friends Dinane and Deidre\par She is here with friend- Shania. Shania reports pt is very forgetful lately. Was here last time with friend Dianne but she doesn’t remember \par She says that she thought she was going to see someone for "acting crazy" \par She is tearful and reports " she cant think straight". She dosent remember the name of her psychiatrist or when she was seen last. \par Denies SI or HI \par After a lot of redirecting, she wanted to discuss cancer treatment option. We discussed CMF chemo but she is not interested. pt needs to be evaluated by psychiatrist first to control anxiety issues and then have better discussion re risks/benefits of chemo. Given her age, comorbidities and uncontrolled anxiety- may not be able to tolerate anything more than AI but will need a discussion about her options when she is able to comprehend risks/benefits. \par \par addendum 4/25: Pt discharged from ER w/o psyche intervention\par addendum 4/26: referred to see Dr Marie urgently\par addendum 4/27: Dr Marie rec to see her primary psychiatrist, Dr Mahajan\par addendum 4/28- spoke to pt. She says she is alone and doesn’t know how to place call to make appt. She says Her mind isnt working.\par  My office conference her in with Dr Mahajan office. She declined an earlier appt and wanted to keep appt for next month. \par \par We will schedule office visit in 1-2 weeks to f/u \par Continue AI in the meantine [de-identified] : In summary, Ms. MARVIN CHISHOLM is a 77 year old postmenopausal female with stage IA (T1b, N0, Mx) ER positive, DE negative, HER-2/susan negative invasive ductal carcinoma of the left breast. She is status post lumpectomy and ALNbx 3/2022. ODX: RS 36 She has h/o severe anxiety and depression.\par \par 5/13/22: She is here today with friend Ranjit. They live together and he is well aware of her medical conditions\par She is in better mood today. Says she is here today because she has an appt to discuss cancer treatment. Not tearful or anxious TODAY, able to have a conversation\par Ranjit had RT for prostate cancer and is very supportive \par She is f/b psychiatrist for anxiety/depression. f/b Dr Mahajan \par We discussed CMF chemo and RT due to high risk ODX\par \par I discussed the expected and unexpected side effects of chemotherapy, including but not limited to hair loss, fatigue, change in taste, mouth sores, nausea, vomiting, diarrhea, infusion reaction, allergic reaction, significant myelosuppression, need for blood transfusion, infection, bleeding, cardiac toxicity, neuropathy, chemical cystitis, kidney injury, nerve pain, muscle pain and detrimental effect on liver, lung and heart function. I also discussed a small but potential risk of development of acute leukemia with the use of cyclophosphamide therapy.\par \par The patient understood all the potential side effects and signed an informed consent after discussing the risks, benefits and alternatives.\par Chemo consent obtained \par Patient is willing to do chemo. Concerned about transportation.  called\par  Holding AI while on chemo\par Case was presented in the TB last week. Consensus was to give CMF chemo f/b RT\par Will refer for RT after chemo\par \par Here to start CMF + onpro #1 today 5/27/22 Counts good \par Chemo s/e discussed,\par She reports being very forgetful . I reminded her to HOLD anastrozole during Chemo  \par Medication for symptom management reviewed and questions answered\par EKG done 5/13/22 she is noted to have left bundle branch block and will f/u with cardio oncology , \par Patient is a anxious about stating chemo today\par Emotional support given, Written instructions given \par \par

## 2022-06-08 NOTE — PROGRESS NOTE ADULT - PROBLEM SELECTOR PLAN 5
- heme/onc consulted: no plan for chemo as inpatient; pt to f/u with her med oncologist Marilyn Putnam at [facility] [place] after d/c

## 2022-06-08 NOTE — PROGRESS NOTE ADULT - PROBLEM SELECTOR PLAN 7
- Orthostatics neg   - At home on valsartan-HTZ 320mg-12.5mg   - Renal function wnl. continue  Valsartan at 160mg and titrate up if needed, will add HTZ if further BP control needed

## 2022-06-08 NOTE — BH CONSULTATION LIAISON PROGRESS NOTE - NSBHFUPINTERVALHXFT_PSY_A_CORE
Patient is feeling more and more frustrated with hospital stay. Says that she has been feeling more depressed and more anxious since being here. Patient states that she has no intent on killing her self currently but if she were to kill herself it would be via overdosing on pills. More history was able to be elucidated today as patient seems more talkative. She states that the pandemic has changed a lot in her life - she has not been able to see her friends, she has not been able to play cards with her friends. Before she used to take walks but now she doesn't anymore because she is "lazy." The patient states over and over again that she is scared of dying and scared of being old. Patient denied having anxiety before bed after we started the Effexor (stated that she did have anxiety on 6/6 day of starting the Effexor). Patient denies active SI/HI, AH/VH, paranoia. Patient was AOx3 Patient is feeling more and more frustrated with hospital stay. Says that she has been feeling more depressed and more anxious since being here. Patient states that she has no intent on killing her self currently but if she were to kill herself it would be via overdosing on pills. More history was able to be elucidated today as patient seems more talkative. She states that the pandemic has changed a lot in her life - she has not been able to see her friends, she has not been able to play cards with her friends. Before she used to take walks but now she doesn't anymore because she is "lazy." The patient states over and over again that she is scared of dying and scared of being old. Patient states that she has been overly depressed for the past month or so and it was gradual.   Patient denied having anxiety before bed after we started the Effexor (stated that she did have anxiety on 6/6 day of starting the Effexor). Patient denies active SI/HI, AH/VH, paranoia. Patient was AOx3 Patient is feeling more and more frustrated with hospital stay. Says that she has been feeling more depressed and more anxious since being here. Patient states that she has no intent on killing her self currently but made a vague SI saying if she were to kill herself it would be via overdosing on pills. The patient retracts the statement as a joke and apologizes as for having "sarcastic sense of humor." More history was able to be elucidated today as patient seems more talkative. She states that the pandemic has changed a lot in her life - she has not been able to see her friends, she has not been able to play cards with her friends. Before she used to take walks but now she doesn't anymore because she is "lazy." The patient states over and over again that she is scared of dying and scared of being old. Patient states that she has been overly depressed for the past month or so and it was gradual.   Patient denied having anxiety before bed after we started the Effexor (stated that she did have anxiety on 6/6 day of starting the Effexor). Patient denies active SI/HI, AH/VH, paranoia. Patient was AOx3

## 2022-06-08 NOTE — ASSESSMENT
[FreeTextEntry1] : In summary, Ms. MARVIN CHISHOLM is a 77 year old postmenopausal female with stage IA (T1b, N0, Mx) ER positive, IL negative, HER-2/susan negative invasive ductal carcinoma of the left breast. She is status post lumpectomy and ALNbx 3/2022. She has h/o severe anxiety and depression. ODX reported 4/13/22: RS 36\par \par 5/13/22: She is here today with friend Ranjit. They live together and he is well aware of her medical conditions\par She is in better mood today. Says she is here today because she has an appt to discuss cancer treatment. Not tearful or anxious TODAY, able to have a conversation. Ranjit had RT for prostate cancer and is very supportive \par She is f/b psychiatrist for anxiety/depression. f/b Dr Mahajan \par \par We discussed CMF chemo and RT due to high risk ODX\par \par I discussed the expected and unexpected side effects of chemotherapy, including but not limited to hair loss, fatigue, change in taste, mouth sores, nausea, vomiting, diarrhea, infusion reaction, allergic reaction, significant myelosuppression, need for blood transfusion, infection, bleeding, cardiac toxicity, neuropathy, chemical cystitis, kidney injury, nerve pain, muscle pain and detrimental effect on liver, lung and heart function. I also discussed a small but potential risk of development of acute leukemia with the use of cyclophosphamide therapy.\par \par The patient understood all the potential side effects and signed an informed consent after discussing the risks, benefits and alternatives.\par Patient is willing to do chemo. Concerned about transportation.  called\par Case was presented in the TB last week. Consensus was to give CMF chemo f/b RT\par \par \par - Breast ca: Starting DDcmf CHEMO #1/8 today 5/27/22 S/E REVIEWED IN DETAIL. Premeds reviewed. \par She is noted to have left bundle branch block on 5/12/22 EKG, No CP or SOB will f/u with cardio oncology \par - Chemo induced N/V- Will get premedications with Emend, dexamethasone and Aloxi. She will continue dexamethasone for next 3 days for antinausea prophylaxis. She will use omeprazole daily. IV fluids prn.\par -Memory loss/ forgetfulness- She reports forgetfulness most likely 2/2 anxiety. reminded her to hold anastrozole during chemo\par - GF induced bone pain- She will take Claritin. \par - Chemo induced dysgeusia and fatigue: Encourage p.o. fluids, small frequent meals \par - CANdidate for Endocrine therapy in the form of AI  x 10 years She started anastrozole on 4/4/2022 but was instructed to HOLD AI while on chemo. Will refer for RT after chemo\par - Instructed to call office and go directly to the emergency room with fever more than 100.4, shaking chills, productive cough, sore throat, shortness of breath or urinary symptoms. Patient verbalized understanding and agreement.\par - Emotional support provided, all questions answered.\par \par RTO 2 weeks. for MD/NP and tx. \par  \par  Appointments made until 7/8/22 and schedule reviewed with patient. Patient was instructed to make additional appointments prn with checkout desk [place] [facility] [place] \par \par \par

## 2022-06-08 NOTE — ASSESSMENT
[FreeTextEntry1] : In summary, Ms. MARVIN CHISHOLM is a 77 year old postmenopausal female with stage IA (T1b, N0, Mx) ER positive, ME negative, HER-2/susan negative invasive ductal carcinoma of the left breast. She is status post lumpectomy and ALNbx 3/2022. She has h/o severe anxiety and depression. ODX reported 4/13/22: RS 36\par \par 5/13/22: She is here today with friend Ranjit. They live together and he is well aware of her medical conditions\par She is in better mood today. Says she is here today because she has an appt to discuss cancer treatment. Not tearful or anxious TODAY, able to have a conversation. Ranjit had RT for prostate cancer and is very supportive \par She is f/b psychiatrist for anxiety/depression. f/b Dr Mahajan \par \par We discussed CMF chemo and RT due to high risk ODX\par \par I discussed the expected and unexpected side effects of chemotherapy, including but not limited to hair loss, fatigue, change in taste, mouth sores, nausea, vomiting, diarrhea, infusion reaction, allergic reaction, significant myelosuppression, need for blood transfusion, infection, bleeding, cardiac toxicity, neuropathy, chemical cystitis, kidney injury, nerve pain, muscle pain and detrimental effect on liver, lung and heart function. I also discussed a small but potential risk of development of acute leukemia with the use of cyclophosphamide therapy.\par \par The patient understood all the potential side effects and signed an informed consent after discussing the risks, benefits and alternatives.\par Patient is willing to do chemo. Concerned about transportation.  called\par Case was presented in the TB last week. Consensus was to give CMF chemo f/b RT\par \par \par - Breast ca: Starting DDcmf CHEMO #1/8 today 5/27/22 S/E REVIEWED IN DETAIL. Premeds reviewed. \par She is noted to have left bundle branch block on 5/12/22 EKG, No CP or SOB will f/u with cardio oncology \par - Chemo induced N/V- Will get premedications with Emend, dexamethasone and Aloxi. She will continue dexamethasone for next 3 days for antinausea prophylaxis. She will use omeprazole daily. IV fluids prn.\par -Memory loss/ forgetfulness- She reports forgetfulness most likely 2/2 anxiety. reminded her to hold anastrozole during chemo\par - GF induced bone pain- She will take Claritin. \par - Chemo induced dysgeusia and fatigue: Encourage p.o. fluids, small frequent meals \par - CANdidate for Endocrine therapy in the form of AI  x 10 years She started anastrozole on 4/4/2022 but was instructed to HOLD AI while on chemo. Will refer for RT after chemo\par - Instructed to call office and go directly to the emergency room with fever more than 100.4, shaking chills, productive cough, sore throat, shortness of breath or urinary symptoms. Patient verbalized understanding and agreement.\par - Emotional support provided, all questions answered.\par \par RTO 2 weeks. for MD/NP and tx. \par  \par  Appointments made until 7/8/22 and schedule reviewed with patient. Patient was instructed to make additional appointments prn with checkout desk prior to leaving [facility] [place].\par \par \par \par

## 2022-06-09 LAB
ANION GAP SERPL CALC-SCNC: 10 MMOL/L — SIGNIFICANT CHANGE UP (ref 7–14)
APPEARANCE UR: CLEAR — SIGNIFICANT CHANGE UP
BASOPHILS # BLD AUTO: 0.04 K/UL — SIGNIFICANT CHANGE UP (ref 0–0.2)
BASOPHILS NFR BLD AUTO: 0.3 % — SIGNIFICANT CHANGE UP (ref 0–2)
BILIRUB UR-MCNC: NEGATIVE — SIGNIFICANT CHANGE UP
BUN SERPL-MCNC: 6 MG/DL — LOW (ref 7–23)
CALCIUM SERPL-MCNC: 9 MG/DL — SIGNIFICANT CHANGE UP (ref 8.4–10.5)
CHLORIDE SERPL-SCNC: 106 MMOL/L — SIGNIFICANT CHANGE UP (ref 98–107)
CO2 SERPL-SCNC: 26 MMOL/L — SIGNIFICANT CHANGE UP (ref 22–31)
COLOR SPEC: COLORLESS — SIGNIFICANT CHANGE UP
CREAT SERPL-MCNC: 0.79 MG/DL — SIGNIFICANT CHANGE UP (ref 0.5–1.3)
CULTURE RESULTS: SIGNIFICANT CHANGE UP
CULTURE RESULTS: SIGNIFICANT CHANGE UP
DIFF PNL FLD: NEGATIVE — SIGNIFICANT CHANGE UP
EGFR: 77 ML/MIN/1.73M2 — SIGNIFICANT CHANGE UP
EOSINOPHIL # BLD AUTO: 0.2 K/UL — SIGNIFICANT CHANGE UP (ref 0–0.5)
EOSINOPHIL NFR BLD AUTO: 1.5 % — SIGNIFICANT CHANGE UP (ref 0–6)
GLUCOSE SERPL-MCNC: 105 MG/DL — HIGH (ref 70–99)
GLUCOSE UR QL: NEGATIVE — SIGNIFICANT CHANGE UP
HCT VFR BLD CALC: 38.7 % — SIGNIFICANT CHANGE UP (ref 34.5–45)
HGB BLD-MCNC: 12.2 G/DL — SIGNIFICANT CHANGE UP (ref 11.5–15.5)
IANC: 10.32 K/UL — HIGH (ref 1.8–7.4)
IMM GRANULOCYTES NFR BLD AUTO: 5.4 % — HIGH (ref 0–1.5)
KETONES UR-MCNC: NEGATIVE — SIGNIFICANT CHANGE UP
LEUKOCYTE ESTERASE UR-ACNC: NEGATIVE — SIGNIFICANT CHANGE UP
LYMPHOCYTES # BLD AUTO: 1.59 K/UL — SIGNIFICANT CHANGE UP (ref 1–3.3)
LYMPHOCYTES # BLD AUTO: 11.7 % — LOW (ref 13–44)
MAGNESIUM SERPL-MCNC: 1.8 MG/DL — SIGNIFICANT CHANGE UP (ref 1.6–2.6)
MCHC RBC-ENTMCNC: 28.7 PG — SIGNIFICANT CHANGE UP (ref 27–34)
MCHC RBC-ENTMCNC: 31.5 GM/DL — LOW (ref 32–36)
MCV RBC AUTO: 91.1 FL — SIGNIFICANT CHANGE UP (ref 80–100)
MONOCYTES # BLD AUTO: 0.71 K/UL — SIGNIFICANT CHANGE UP (ref 0–0.9)
MONOCYTES NFR BLD AUTO: 5.2 % — SIGNIFICANT CHANGE UP (ref 2–14)
NEUTROPHILS # BLD AUTO: 10.32 K/UL — HIGH (ref 1.8–7.4)
NEUTROPHILS NFR BLD AUTO: 75.9 % — SIGNIFICANT CHANGE UP (ref 43–77)
NITRITE UR-MCNC: NEGATIVE — SIGNIFICANT CHANGE UP
NRBC # BLD: 0 /100 WBCS — SIGNIFICANT CHANGE UP
NRBC # FLD: 0.04 K/UL — HIGH
PH UR: 8 — SIGNIFICANT CHANGE UP (ref 5–8)
PHOSPHATE SERPL-MCNC: 3 MG/DL — SIGNIFICANT CHANGE UP (ref 2.5–4.5)
PLATELET # BLD AUTO: 100 K/UL — LOW (ref 150–400)
POTASSIUM SERPL-MCNC: 3.7 MMOL/L — SIGNIFICANT CHANGE UP (ref 3.5–5.3)
POTASSIUM SERPL-SCNC: 3.7 MMOL/L — SIGNIFICANT CHANGE UP (ref 3.5–5.3)
PROT UR-MCNC: NEGATIVE — SIGNIFICANT CHANGE UP
RBC # BLD: 4.25 M/UL — SIGNIFICANT CHANGE UP (ref 3.8–5.2)
RBC # FLD: 14.9 % — HIGH (ref 10.3–14.5)
SODIUM SERPL-SCNC: 142 MMOL/L — SIGNIFICANT CHANGE UP (ref 135–145)
SP GR SPEC: 1.01 — SIGNIFICANT CHANGE UP (ref 1–1.05)
SPECIMEN SOURCE: SIGNIFICANT CHANGE UP
SPECIMEN SOURCE: SIGNIFICANT CHANGE UP
UROBILINOGEN FLD QL: SIGNIFICANT CHANGE UP
WBC # BLD: 13.6 K/UL — HIGH (ref 3.8–10.5)
WBC # FLD AUTO: 13.6 K/UL — HIGH (ref 3.8–10.5)

## 2022-06-09 PROCEDURE — 99232 SBSQ HOSP IP/OBS MODERATE 35: CPT

## 2022-06-09 RX ADMIN — HEPARIN SODIUM 5000 UNIT(S): 5000 INJECTION INTRAVENOUS; SUBCUTANEOUS at 05:45

## 2022-06-09 RX ADMIN — Medication 1 DROP(S): at 22:30

## 2022-06-09 RX ADMIN — SODIUM CHLORIDE 75 MILLILITER(S): 9 INJECTION, SOLUTION INTRAVENOUS at 00:11

## 2022-06-09 RX ADMIN — PANTOPRAZOLE SODIUM 40 MILLIGRAM(S): 20 TABLET, DELAYED RELEASE ORAL at 05:45

## 2022-06-09 RX ADMIN — MIRTAZAPINE 45 MILLIGRAM(S): 45 TABLET, ORALLY DISINTEGRATING ORAL at 21:40

## 2022-06-09 RX ADMIN — VALSARTAN 160 MILLIGRAM(S): 80 TABLET ORAL at 05:45

## 2022-06-09 RX ADMIN — QUETIAPINE FUMARATE 125 MILLIGRAM(S): 200 TABLET, FILM COATED ORAL at 21:41

## 2022-06-09 RX ADMIN — SODIUM CHLORIDE 75 MILLILITER(S): 9 INJECTION, SOLUTION INTRAVENOUS at 12:05

## 2022-06-09 RX ADMIN — Medication 75 MILLIGRAM(S): at 12:04

## 2022-06-09 RX ADMIN — HEPARIN SODIUM 5000 UNIT(S): 5000 INJECTION INTRAVENOUS; SUBCUTANEOUS at 17:10

## 2022-06-09 RX ADMIN — Medication 650 MILLIGRAM(S): at 04:10

## 2022-06-09 RX ADMIN — Medication 650 MILLIGRAM(S): at 03:10

## 2022-06-09 RX ADMIN — ATORVASTATIN CALCIUM 20 MILLIGRAM(S): 80 TABLET, FILM COATED ORAL at 21:41

## 2022-06-09 NOTE — BH CONSULTATION LIAISON PROGRESS NOTE - NSBHCONSULTRECOMMENDOTHER_PSY_A_CORE FT
Patient should be considered for inpatient H as she could benefit from geriatric inpatient therapy. Patient should only be discharged home if HHA is set in place

## 2022-06-09 NOTE — PROGRESS NOTE ADULT - PROBLEM SELECTOR PLAN 5
- heme/onc consulted: no plan for chemo as inpatient; pt to f/u with her med oncologist Marilyn Putnam at [facility] [place] after d/c

## 2022-06-09 NOTE — PROGRESS NOTE ADULT - PROBLEM SELECTOR PLAN 8
- heparin subq for dvt ppx  Dispo; PT: home; no skilled PT needs. pending psych clearance [facility] vs. home

## 2022-06-09 NOTE — BH CONSULTATION LIAISON PROGRESS NOTE - NSBHASSESSMENTFT_PSY_ALL_CORE
Patient is a 76 y/o  F, living in her own house with daughter, with pmhx of recently diagnosed breast Ca on chemo first session 5/27/2022, anxiety, COPD, HTN, HLD, presented to the ED for new onset worsening confusion and vague symptoms. Labs show leukocytosis (leukopenia on admission 6/3).     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 Patient scored 20/30 on MoCA (6/6)  After starting Remeron, patient is observed to be more animated and awake. Patient should be treated for dementia and undergo full dementia workup and workup for brain mets given active cancer.    6/7 - improvement in mood, tolerating effexor, still has cognitive deficits in short term memory though attention improved     6/8- improvement in affect, tolerating effexor, attention improved,  On observation patient used to answer more questions with "I don't know" but as time is going on patient has been answering less questions with that response. Patient is aware of cognitive/memory decline. Still very anxious, depressed    6/9- patient has improvement in mood, patient feels ready to go home and doesn't feel sick enough to stay in hospital, patient amenable to HHA to help her with medication taking/getting to chemo appointments    PLAN  - patient with no SI or HI, no hx of SA, no psychosis, help seeking   - CONTINUE home medications  -- continue Remeron 45mg qhs  -- INCREASE Seroquel to 125mg qhs ** if qtc < 500  - continue Effexor XR 75 PO AM  ---- antipsychotics can only be given if qtc < 500   - Can make xanax 0.5mg a PRN as patient does not use this daily at home -> can give xanax 0.5mg BID PRN  ---(istop Reference #: [id number])  - labs: b12, folate, tsh  - neuro c/s appreciated   - given risk of brain mets consider MRI with/without contrast   - may need [facility] admission as patient's depression/anxiety is effecting judgement, cognition, and ability to tolerate chemo   - patient should be discharged with HHA to help with medication taking and making to chemo appointments    Patient is a 78 y/o  F, living in her own house with daughter, with pmhx of recently diagnosed breast Ca on chemo first session 5/27/2022, anxiety, COPD, HTN, HLD, presented to the ED for new onset worsening confusion and vague symptoms. Labs show leukocytosis (leukopenia on admission 6/3).     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 Patient scored 20/30 on MoCA (6/6)  After starting Remeron, patient is observed to be more animated and awake. Patient should be treated for dementia and undergo full dementia workup and workup for brain mets given active cancer.    6/7 - improvement in mood, tolerating effexor, still has cognitive deficits in short term memory though attention improved     6/8- improvement in affect, tolerating effexor, attention improved,  On observation patient used to answer more questions with "I don't know" but as time is going on patient has been answering less questions with that response. Patient is aware of cognitive/memory decline. Still very anxious, depressed    6/9- patient has improvement in mood, patient feels ready to go home and doesn't feel sick enough to stay in hospital, patient amenable to A to help her with medication taking/getting to chemo appointments    PLAN  - patient with no SI or HI, no hx of SA, no psychosis, help seeking   - CONTINUE home medications  -- continue Remeron 45mg qhs  -- continue Seroquel to 125mg qhs ** if qtc < 500  - continue Effexor XR 75 PO AM  ---- antipsychotics can only be given if qtc < 500   - Can make xanax 0.5mg a PRN as patient does not use this daily at home -> can give xanax 0.5mg BID PRN  ---(istop Reference #: [id number])  - labs: b12, folate, tsh  - neuro c/s appreciated   - given risk of brain mets consider MRI with/without contrast   - may need [facility] admission as patient's depression/anxiety is effecting judgement, cognition, and ability to tolerate chemo

## 2022-06-09 NOTE — PROGRESS NOTE ADULT - PROBLEM SELECTOR PLAN 2
Assessment:  - patient presents for worsening confusion and forgetfulness after chemotherapy.  Intermittent period of confusion, improving: possible underlying dementia as reversible causes of dementia unremarkable  Bcx NTD, ucx   - Apprec Psych rec:  TSH: wnl  B12: elevated , folate: wnl; f/u  RPR, antithyroglobulin Ab, TPO Ab, vitamin B1, B6,  - Procal neg   - CXR neg, CT head unremarkable   - F/u  Remeron 45mg qhs, Seroquel 100mg qhs -> increased seroquel to 125mg qhs and effexor 75mg   - Apprec hem/onc rec: no chemo inpatient,    - Neurology consulted, appreciate recs Will f/u  MRI brain - ordered on 6/7

## 2022-06-09 NOTE — PROGRESS NOTE ADULT - SUBJECTIVE AND OBJECTIVE BOX
[facility]  Division of Hospital Medicine  Becky Napoles MD  Pager: [phone]      Patient is a 77y old  Female who presents with a chief complaint of confusion (2022 13:32)      SUBJECTIVE / OVERNIGHT EVENTS: Patient examined at bedside. no more external vaginal discomfort. Asking when she will be able to go home. Discussed that pt medications were adjusted/   ADDITIONAL REVIEW OF SYSTEMS:    MEDICATIONS  (STANDING):  atorvastatin 20 milliGRAM(s) Oral at bedtime  heparin   Injectable 5000 Unit(s) SubCutaneous every 12 hours  lactated ringers. 1000 milliLiter(s) (75 mL/Hr) IV Continuous <Continuous>  mirtazapine 45 milliGRAM(s) Oral at bedtime  pantoprazole    Tablet 40 milliGRAM(s) Oral before breakfast  QUEtiapine 125 milliGRAM(s) Oral at bedtime  valsartan 160 milliGRAM(s) Oral daily  venlafaxine XR. 75 milliGRAM(s) Oral daily    MEDICATIONS  (PRN):  acetaminophen     Tablet .. 650 milliGRAM(s) Oral every 6 hours PRN Temp greater or equal to 38C (100.4F), Mild Pain (1 - 3)  ALPRAZolam 0.5 milliGRAM(s) Oral two times a day PRN anxious  melatonin 3 milliGRAM(s) Oral at bedtime PRN Insomnia      CAPILLARY BLOOD GLUCOSE        I&O's Summary      PHYSICAL EXAM:  Vital Signs Last 24 Hrs  T(C): 36.8 (2022 05:40), Max: 37.3 (2022 12:57)  T(F): 98.3 (2022 05:40), Max: 99.2 (2022 12:57)  HR: 98 (2022 05:40) (90 - 98)  BP: 172/86 (2022 05:40) (149/87 - 172/86)  BP(mean): --  RR: 18 (2022 05:40) (18 - 18)  SpO2: 95% (2022 05:40) (95% - 95%)  CONSTITUTIONAL: Elderly woman in NAD, well-developed, well-groomed  RESPIRATORY: Normal respiratory effort; lungs are clear to auscultation bilaterally  CARDIOVASCULAR: Regular rate and rhythm, normal S1 and S2, no murmur/rub/gallop; No lower extremity edema; Peripheral pulses are 2+ bilaterally  ABDOMEN: Nontender to palpation, normoactive bowel sounds, no rebound/guarding; No hepatosplenomegaly  PSYCH: A+O to person, place, and time; -  and 2022  affect appropriate  NEUROLOGY:  no gross sensory deficits   SKIN: No rashes; no palpable lesions    LABS:                        12.2   13.60 )-----------( 100      ( 2022 06:45 )             38.7         142  |  106  |  6<L>  ----------------------------<  105<H>  3.7   |  26  |  0.79    Ca    9.0      2022 06:45  Phos  3.0       Mg     1.80                 Urinalysis Basic - ( 2022 07:55 )    Color: Colorless / Appearance: Clear / S.008 / pH: x  Gluc: x / Ketone: Negative  / Bili: Negative / Urobili: <2 mg/dL   Blood: x / Protein: Negative / Nitrite: Negative   Leuk Esterase: Negative / RBC: x / WBC x   Sq Epi: x / Non Sq Epi: x / Bacteria: x          RADIOLOGY & ADDITIONAL TESTS:  Results Reviewed:   Imaging Personally Reviewed:  Electrocardiogram Personally Reviewed:    COORDINATION OF CARE:  Care Discussed with Consultants/Other Providers [Y/N]:  Prior or Outpatient Records Reviewed [Y/N]:

## 2022-06-09 NOTE — BH CONSULTATION LIAISON PROGRESS NOTE - NSBHFUPINTERVALHXFT_PSY_A_CORE
Patient feels well. She states that she is ready to go home. When asked if she would be able to take care of herself, she states that people would be there to help her. She is amenable to a HHA. Patient states that her mood is a lot better, and does not feel any sadness, or hopelessness. Patient denies SI/H AH/VH delusions or paranoia. Patient states that she will try to make plans with her friends as that has not happened in a long time because of COVID 19 pandemic. Patient said she had a panic attack yesterday and could not explain precipitating factors, but was given PRN Ativan and it helped her. Patient states energy is a lot better, she is sleeping well, and she has a normal appetite. Patient is nervous about chemotherapy and says that she does not feel comfortable driving herself around to appointments, even though she was driving last week before hospitalization. Patient says that she will try to take all her medications, as long as we write them all down for her. Patient says that she still has weakness, and knows she will not be able to climb the stairs in her duplex house where she lives on the top floor.

## 2022-06-09 NOTE — BH CONSULTATION LIAISON PROGRESS NOTE - CASE SUMMARY
patient more alert, open and animated but still very depressed, anxious, denies SI/HI but does not want chemo because she feels her anxiety precludes her from tolerating it and taking care of herself, family concurs that patient has not been able to do basic ADLs, would increase meds as above for depression. May need [facility] inpt admission given severity of illness and debility.  patient alert, denies depression, anxiety, or SI, appears more cognitively stable, will retest MOCA tomorrow and confer with family regarding future planning

## 2022-06-10 ENCOUNTER — APPOINTMENT (OUTPATIENT)
Dept: INFUSION THERAPY | Facility: HOSPITAL | Age: 77
End: 2022-06-10

## 2022-06-10 ENCOUNTER — APPOINTMENT (OUTPATIENT)
Dept: HEMATOLOGY ONCOLOGY | Facility: CLINIC | Age: 77
End: 2022-06-10

## 2022-06-10 ENCOUNTER — APPOINTMENT (OUTPATIENT)
Dept: CARDIOLOGY | Facility: CLINIC | Age: 77
End: 2022-06-10

## 2022-06-10 DIAGNOSIS — F02.80 DEMENTIA IN OTHER DISEASES CLASSIFIED ELSEWHERE, UNSPECIFIED SEVERITY, WITHOUT BEHAVIORAL DISTURBANCE, PSYCHOTIC DISTURBANCE, MOOD DISTURBANCE, AND ANXIETY: ICD-10-CM

## 2022-06-10 LAB
AMMONIA BLD-MCNC: 59 UMOL/L — HIGH (ref 11–55)
ANION GAP SERPL CALC-SCNC: 9 MMOL/L — SIGNIFICANT CHANGE UP (ref 7–14)
ANISOCYTOSIS BLD QL: SLIGHT — SIGNIFICANT CHANGE UP
BASOPHILS # BLD AUTO: 0 K/UL — SIGNIFICANT CHANGE UP (ref 0–0.2)
BASOPHILS NFR BLD AUTO: 0 % — SIGNIFICANT CHANGE UP (ref 0–2)
BUN SERPL-MCNC: 9 MG/DL — SIGNIFICANT CHANGE UP (ref 7–23)
CALCIUM SERPL-MCNC: 8.8 MG/DL — SIGNIFICANT CHANGE UP (ref 8.4–10.5)
CHLORIDE SERPL-SCNC: 104 MMOL/L — SIGNIFICANT CHANGE UP (ref 98–107)
CO2 SERPL-SCNC: 27 MMOL/L — SIGNIFICANT CHANGE UP (ref 22–31)
CREAT SERPL-MCNC: 0.84 MG/DL — SIGNIFICANT CHANGE UP (ref 0.5–1.3)
EGFR: 72 ML/MIN/1.73M2 — SIGNIFICANT CHANGE UP
EOSINOPHIL # BLD AUTO: 0.26 K/UL — SIGNIFICANT CHANGE UP (ref 0–0.5)
EOSINOPHIL NFR BLD AUTO: 1.8 % — SIGNIFICANT CHANGE UP (ref 0–6)
GIANT PLATELETS BLD QL SMEAR: PRESENT — SIGNIFICANT CHANGE UP
GLUCOSE SERPL-MCNC: 80 MG/DL — SIGNIFICANT CHANGE UP (ref 70–99)
HCT VFR BLD CALC: 36.7 % — SIGNIFICANT CHANGE UP (ref 34.5–45)
HGB BLD-MCNC: 11.6 G/DL — SIGNIFICANT CHANGE UP (ref 11.5–15.5)
IANC: 10.99 K/UL — HIGH (ref 1.8–7.4)
LYMPHOCYTES # BLD AUTO: 0.65 K/UL — LOW (ref 1–3.3)
LYMPHOCYTES # BLD AUTO: 4.5 % — LOW (ref 13–44)
MACROCYTES BLD QL: SLIGHT — SIGNIFICANT CHANGE UP
MAGNESIUM SERPL-MCNC: 1.7 MG/DL — SIGNIFICANT CHANGE UP (ref 1.6–2.6)
MANUAL SMEAR VERIFICATION: SIGNIFICANT CHANGE UP
MCHC RBC-ENTMCNC: 28.9 PG — SIGNIFICANT CHANGE UP (ref 27–34)
MCHC RBC-ENTMCNC: 31.6 GM/DL — LOW (ref 32–36)
MCV RBC AUTO: 91.3 FL — SIGNIFICANT CHANGE UP (ref 80–100)
METAMYELOCYTES # FLD: 0.9 % — SIGNIFICANT CHANGE UP (ref 0–1)
MONOCYTES # BLD AUTO: 0.13 K/UL — SIGNIFICANT CHANGE UP (ref 0–0.9)
MONOCYTES NFR BLD AUTO: 0.9 % — LOW (ref 2–14)
NEUTROPHILS # BLD AUTO: 13.33 K/UL — HIGH (ref 1.8–7.4)
NEUTROPHILS NFR BLD AUTO: 84.7 % — HIGH (ref 43–77)
NEUTS BAND # BLD: 7.2 % — HIGH (ref 0–6)
NRBC # BLD: 2 /100 — HIGH (ref 0–0)
OVALOCYTES BLD QL SMEAR: SLIGHT — SIGNIFICANT CHANGE UP
PHOSPHATE SERPL-MCNC: 3.1 MG/DL — SIGNIFICANT CHANGE UP (ref 2.5–4.5)
PLAT MORPH BLD: NORMAL — SIGNIFICANT CHANGE UP
PLATELET # BLD AUTO: 120 K/UL — LOW (ref 150–400)
PLATELET COUNT - ESTIMATE: ABNORMAL
POIKILOCYTOSIS BLD QL AUTO: SLIGHT — SIGNIFICANT CHANGE UP
POLYCHROMASIA BLD QL SMEAR: SLIGHT — SIGNIFICANT CHANGE UP
POTASSIUM SERPL-MCNC: 3.8 MMOL/L — SIGNIFICANT CHANGE UP (ref 3.5–5.3)
POTASSIUM SERPL-SCNC: 3.8 MMOL/L — SIGNIFICANT CHANGE UP (ref 3.5–5.3)
RBC # BLD: 4.02 M/UL — SIGNIFICANT CHANGE UP (ref 3.8–5.2)
RBC # FLD: 15 % — HIGH (ref 10.3–14.5)
RBC BLD AUTO: ABNORMAL
SARS-COV-2 RNA SPEC QL NAA+PROBE: SIGNIFICANT CHANGE UP
SMUDGE CELLS # BLD: PRESENT — SIGNIFICANT CHANGE UP
SODIUM SERPL-SCNC: 140 MMOL/L — SIGNIFICANT CHANGE UP (ref 135–145)
SPHEROCYTES BLD QL SMEAR: SLIGHT — SIGNIFICANT CHANGE UP
WBC # BLD: 14.51 K/UL — HIGH (ref 3.8–10.5)
WBC # FLD AUTO: 14.51 K/UL — HIGH (ref 3.8–10.5)

## 2022-06-10 PROCEDURE — 99232 SBSQ HOSP IP/OBS MODERATE 35: CPT

## 2022-06-10 PROCEDURE — 70553 MRI BRAIN STEM W/O & W/DYE: CPT | Mod: 26

## 2022-06-10 PROCEDURE — 99233 SBSQ HOSP IP/OBS HIGH 50: CPT

## 2022-06-10 RX ORDER — HYDROCHLOROTHIAZIDE 25 MG
12.5 TABLET ORAL DAILY
Refills: 0 | Status: DISCONTINUED | OUTPATIENT
Start: 2022-06-10 | End: 2022-06-12

## 2022-06-10 RX ORDER — VENLAFAXINE HCL 75 MG
75 CAPSULE, EXT RELEASE 24 HR ORAL DAILY
Refills: 0 | Status: DISCONTINUED | OUTPATIENT
Start: 2022-06-11 | End: 2022-06-12

## 2022-06-10 RX ORDER — QUETIAPINE FUMARATE 200 MG/1
150 TABLET, FILM COATED ORAL AT BEDTIME
Refills: 0 | Status: DISCONTINUED | OUTPATIENT
Start: 2022-06-10 | End: 2022-06-12

## 2022-06-10 RX ORDER — ALPRAZOLAM 0.25 MG
0.25 TABLET ORAL
Refills: 0 | Status: DISCONTINUED | OUTPATIENT
Start: 2022-06-10 | End: 2022-06-12

## 2022-06-10 RX ORDER — MAGNESIUM OXIDE 400 MG ORAL TABLET 241.3 MG
400 TABLET ORAL
Refills: 0 | Status: COMPLETED | OUTPATIENT
Start: 2022-06-10 | End: 2022-06-11

## 2022-06-10 RX ADMIN — Medication 650 MILLIGRAM(S): at 05:38

## 2022-06-10 RX ADMIN — MAGNESIUM OXIDE 400 MG ORAL TABLET 400 MILLIGRAM(S): 241.3 TABLET ORAL at 18:29

## 2022-06-10 RX ADMIN — Medication 1 DROP(S): at 22:03

## 2022-06-10 RX ADMIN — VALSARTAN 160 MILLIGRAM(S): 80 TABLET ORAL at 05:39

## 2022-06-10 RX ADMIN — MAGNESIUM OXIDE 400 MG ORAL TABLET 400 MILLIGRAM(S): 241.3 TABLET ORAL at 13:29

## 2022-06-10 RX ADMIN — QUETIAPINE FUMARATE 150 MILLIGRAM(S): 200 TABLET, FILM COATED ORAL at 22:08

## 2022-06-10 RX ADMIN — Medication 650 MILLIGRAM(S): at 06:38

## 2022-06-10 RX ADMIN — MIRTAZAPINE 45 MILLIGRAM(S): 45 TABLET, ORALLY DISINTEGRATING ORAL at 22:04

## 2022-06-10 RX ADMIN — PANTOPRAZOLE SODIUM 40 MILLIGRAM(S): 20 TABLET, DELAYED RELEASE ORAL at 05:38

## 2022-06-10 RX ADMIN — HEPARIN SODIUM 5000 UNIT(S): 5000 INJECTION INTRAVENOUS; SUBCUTANEOUS at 18:27

## 2022-06-10 RX ADMIN — ATORVASTATIN CALCIUM 20 MILLIGRAM(S): 80 TABLET, FILM COATED ORAL at 22:04

## 2022-06-10 RX ADMIN — Medication 0.5 MILLIGRAM(S): at 07:58

## 2022-06-10 RX ADMIN — Medication 1 DROP(S): at 05:38

## 2022-06-10 RX ADMIN — Medication 1 DROP(S): at 13:25

## 2022-06-10 RX ADMIN — Medication 75 MILLIGRAM(S): at 13:25

## 2022-06-10 RX ADMIN — HEPARIN SODIUM 5000 UNIT(S): 5000 INJECTION INTRAVENOUS; SUBCUTANEOUS at 05:39

## 2022-06-10 RX ADMIN — Medication 12.5 MILLIGRAM(S): at 13:31

## 2022-06-10 NOTE — BH CONSULTATION LIAISON PROGRESS NOTE - NSICDXBHSECONDARYDX_PSY_ALL_CORE
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

## 2022-06-10 NOTE — BH CONSULTATION LIAISON PROGRESS NOTE - NSBHCHARTREVIEWVS_PSY_A_CORE FT
Vital Signs Last 24 Hrs  T(C): 36.8 (07 Jun 2022 05:40), Max: 37.2 (06 Jun 2022 11:57)  T(F): 98.2 (07 Jun 2022 05:40), Max: 98.9 (06 Jun 2022 11:57)  HR: 93 (06 Jun 2022 21:27) (93 - 97)  BP: 132/75 (07 Jun 2022 05:40) (132/75 - 147/84)  BP(mean): --  RR: 18 (07 Jun 2022 05:40) (17 - 18)  SpO2: 97% (07 Jun 2022 05:40) (94% - 98%)
Vital Signs Last 24 Hrs  T(C): 36.7 (05 Jun 2022 21:06), Max: 36.7 (05 Jun 2022 12:00)  T(F): 98.1 (05 Jun 2022 21:06), Max: 98.1 (05 Jun 2022 12:00)  HR: 80 (06 Jun 2022 06:28) (80 - 91)  BP: 137/78 (06 Jun 2022 06:28) (131/72 - 143/75)  BP(mean): --  RR: 18 (06 Jun 2022 06:28) (17 - 18)  SpO2: 98% (06 Jun 2022 06:28) (95% - 98%)
Vital Signs Last 24 Hrs  T(C): 36.8 (08 Jun 2022 06:15), Max: 37.2 (07 Jun 2022 12:49)  T(F): 98.2 (08 Jun 2022 06:15), Max: 99 (07 Jun 2022 12:49)  HR: 87 (08 Jun 2022 06:15) (87 - 93)  BP: 134/79 (08 Jun 2022 06:15) (134/79 - 149/84)  BP(mean): --  RR: 18 (08 Jun 2022 06:15) (18 - 18)  SpO2: 97% (08 Jun 2022 06:15) (95% - 97%)
Vital Signs Last 24 Hrs  T(C): 36.8 (09 Jun 2022 05:40), Max: 37.3 (08 Jun 2022 12:57)  T(F): 98.3 (09 Jun 2022 05:40), Max: 99.2 (08 Jun 2022 12:57)  HR: 98 (09 Jun 2022 05:40) (90 - 98)  BP: 172/86 (09 Jun 2022 05:40) (149/87 - 172/86)  BP(mean): --  RR: 18 (09 Jun 2022 05:40) (18 - 18)  SpO2: 95% (09 Jun 2022 05:40) (95% - 95%)
Vital Signs Last 24 Hrs  T(C): 36.5 (10 Bandar 2022 12:49), Max: 36.7 (09 Jun 2022 21:34)  T(F): 97.7 (10 Bandar 2022 12:49), Max: 98.1 (09 Jun 2022 21:34)  HR: 90 (10 Bandar 2022 12:49) (90 - 94)  BP: 155/93 (10 Bandar 2022 12:49) (128/65 - 159/94)  BP(mean): --  RR: 18 (10 Bandar 2022 12:49) (18 - 18)  SpO2: 95% (10 Bandar 2022 12:49) (94% - 95%)

## 2022-06-10 NOTE — BH CONSULTATION LIAISON PROGRESS NOTE - CASE SUMMARY
patient alert, denies depression, anxiety, or SI, appears more cognitively stable, will retest MOCA tomorrow and confer with family regarding future planning  patient continues to display improvement with depressoin/anxiety, still has some cognitive limitations particularly with regards to short term memory, better able to do tasks that require concentration /motivation. MOCA as above. Unlikely to need [facility] admission, should f/u with outpt geriatric psych as above [phone]. We will begin setup for outpt [facility] f/u with daughter closer to DC. unclear contribution of MRI as above, per report unlikely to be mets? however location still concerning as may concern executive function deficits. May consider discussion with neuro regarding this.

## 2022-06-10 NOTE — CHART NOTE - NSCHARTNOTEFT_GEN_A_CORE
MRI brain: Bilateral choroid plexus cyst are seen  Discussed with neurology team, no acute intervention.  Team to continue to monitor.

## 2022-06-10 NOTE — PROGRESS NOTE ADULT - PROBLEM SELECTOR PLAN 7
[facility]  Nephrology  Progress Note    Patient Name: Femi Enciso  MRN: [id number]  Admission Date: 10/27/2018  Hospital Length of Stay: 20 days  Attending Provider: Femi Patel MD   Primary Care Physician: Primary Doctor No  Principal Problem:S/P liver transplant    Subjective:     HPI: 54 y/o man with DM2 presents to the ED with family for liver failure (likely due to EtOH abundant history of drinking - diagnosed in Sept 2018 in [place]).  He reports jaundice, generalized weakness, nausea, diarrhea, and decreased appetite since Sept 2018.  He is from [place], [place], and Dr. Sharma (patients physician) recommended bringing him to hospital for evaluation.  Patient denies any fever, chills, vomiting, chest pain, palpitations, SOB, abdominal pain.      Nephrology consulted for evaluation/management Adri.     Interval History:   Hg kept dropping and he was taken to OR x 2 for wash out. He tolerated SLED overnight remains anuric. He return from OR on Norepi and intubated.     Review of patient's allergies indicates:   Allergen Reactions    Penicillins Nausea And Vomiting and Rash     Current Facility-Administered Medications   Medication Frequency    0.9%  NaCl infusion (CRRT USE ONLY) Continuous    0.9%  NaCl infusion (CRRT USE ONLY) Continuous    0.9%  NaCl infusion (for blood administration) Q24H PRN    ciprofloxacin (CIPRO)400mg/200ml D5W IVPB 400 mg On Call Procedure    dextrose 50% injection 12.5 g PRN    famotidine (PF) injection 20 mg Daily    fentaNYL injection 25 mcg Q1H PRN    fluconazole (DIFLUCAN) IVPB 200 mg 100 mL Q24H    glucagon (human recombinant) injection 1 mg PRN    heparin (porcine) injection 5,000 Units Q8H    insulin aspart U-100 pen 0-5 Units Q6H PRN    linezolid 600mg/300ml 600 mg/300 mL IVPB 600 mg On Call Procedure    magnesium sulfate 2g in water 50mL IVPB (premix) PRN    magnesium sulfate 2g in water 50mL IVPB (premix) PRN    methylPREDNISolone sodium  succinate injection 20 mg Q12H    Followed by    [START ON 11/17/2018] predniSONE tablet 20 mg Daily    mycophenolate (CELLCEPT) 1,000 mg in dextrose 5 % 250 mL IVPB Daily    norepinephrine 4 mg in dextrose 5% 250 mL infusion (premix) (titrating) Continuous    oxyCODONE immediate release tablet 5 mg Q6H PRN    oxyCODONE immediate release tablet Tab 10 mg Q4H PRN    phenylephrine HCl in 0.9% NaCl 1 mg/10 mL (100 mcg/mL) syringe     propofol (DIPRIVAN) 10 mg/mL infusion Continuous    ramelteon tablet 8 mg Nightly PRN    [START ON 11/17/2018] Standard [employer] ADULT TPN for patient WITH electrolyte abnormality or renal dysfunction (CENTRAL) Continuous    tacrolimus capsule 1 mg BID    [START ON 11/21/2018] valganciclovir 50 mg/ml oral solution 450 mg Daily       Objective:     Vital Signs (Most Recent):  Temp: 97.8 °F (36.6 °C) (11/16/18 1500)  Pulse: 107 (11/16/18 1545)  Resp: (!) 23 (11/16/18 1545)  BP: (!) 91/56 (11/16/18 1500)  SpO2: 100 % (11/16/18 1545)  O2 Device (Oxygen Therapy): room air (11/16/18 1500) Vital Signs (24h Range):  Temp:  [97.7 °F (36.5 °C)-98.6 °F (37 °C)] 97.8 °F (36.6 °C)  Pulse:  [] 107  Resp:  [11-39] 23  SpO2:  [95 %-100 %] 100 %  BP: ()/(56-81) 91/56  Arterial Line BP: ()/(35-81) 106/58     Weight: 98.1 kg (216 lb 4.3 oz) (11/15/18 0500)  Body mass index is 31.03 kg/m².  Body surface area is 2.2 meters squared.    I/O last 3 completed shifts:  In: 48491.3 [I.V.:41980.3; Blood:2463]  Out: 7798 [Urine:5; Drains:120; Other:7573; Blood:100]    Physical Exam   Constitutional: He appears well-developed. He appears cachectic. He is cooperative. No distress. He is sedated and intubated.   Pleasantly disoriented but improved from yesterday.    HENT:   Head: Normocephalic and atraumatic.   Eyes: Conjunctivae are normal. Pupils are equal, round, and reactive to light.   Neck: Trachea normal. Neck supple. No JVD present.   Cardiovascular: Normal rate, regular rhythm,  S1 normal, S2 normal and normal pulses. Exam reveals no gallop and no friction rub.   No murmur heard.  Pulmonary/Chest: Effort normal. He is intubated. He has decreased breath sounds in the right lower field and the left lower field. He has no rhonchi.   Abdominal: Soft. He exhibits distension. He exhibits no ascites. Bowel sounds are decreased. There is no tenderness.       Musculoskeletal: Normal range of motion. He exhibits edema (edema+ trace pitting sacral).   Neurological:   Pleasantly disoriented no focal deficit.    Skin: Skin is warm and dry. Capillary refill takes less than 2 seconds.   Psychiatric: He has a normal mood and affect. His behavior is normal.   Vitals reviewed.      Significant Labs:  ABGs:   Recent Labs   Lab 11/16/18  1842   PH 7.289*   PCO2 48.2*   HCO3 23.2*   POCSATURATED 100   BE -3     BMP:   Recent Labs   Lab 11/16/18  1506 11/16/18  1845   * 186*    110   CO2 25 21*   BUN 12 13   CREATININE 0.9 0.8   CALCIUM 7.7* 8.3*   MG 2.0  2.0  --      Cardiac Markers: No results for input(s): CKMB, TROPONINT, MYOGLOBIN in the last 168 hours.  CBC:   Recent Labs   Lab 11/16/18  1845   WBC 22.74*   RBC 3.12*   HGB 9.4*   HCT 28.4*   PLT 83*   MCV 91   MCH 30.1   MCHC 33.1     CMP:   Recent Labs   Lab 11/16/18  1845   *   CALCIUM 8.3*   ALBUMIN 2.0*   PROT 3.1*      K 4.8   CO2 21*      BUN 13   CREATININE 0.8   ALKPHOS 120   *   *   BILITOT 9.3*     Coagulation:   Recent Labs   Lab 11/16/18  0320  11/16/18  1845   INR 1.1   < > 1.4*   APTT 26.0  --   --     < > = values in this interval not displayed.     No results for input(s): COLORU, CLARITYU, SPECGRAV, PHUR, PROTEINUA, GLUCOSEU, BILIRUBINCON, BLOODU, WBCU, RBCU, BACTERIA, MUCUS, NITRITE, LEUKOCYTESUR, UROBILINOGEN, HYALINECASTS in the last 168 hours.  All labs within the past 24 hours have been reviewed.     Significant Imaging:  Labs: Reviewed  US: Reviewed    Assessment/Plan:     DEL (acute  kidney injury)    DEL oliguric with unknown baseline sCr, most likely suspect iATN multifactorial from ischemia due to hypotension/volume depletion ( high output diarrhea) and possible pigmented nephropathy in setting of very high BB 39-40 and component of HRS physiology.   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 I will follow-up with patient. Please contact us if you have any additional questions.    Nikolay Nino MD  Nephrology  [facility]   - Orthostatics neg   - At home on valsartan-HTZ 320mg-12.5mg   - Renal function wnl. continue  Valsartan at 160mg and titrate up if needed, will add HTZ if further BP control needed - Orthostatics neg   - At home on valsartan-HTZ 320mg-12.5mg   - Renal function wnl. continue  Valsartan at 160mg and titrate up if needed, will add HTZ if further BP control needed  - BP above goal - re-start HCTZ on 6/10

## 2022-06-10 NOTE — DIETITIAN INITIAL EVALUATION ADULT - ENTER TO (CAL/KG)
Patient Specific Counseling (Will Not Stick From Patient To Patient): Will give additional refills if labs WNL.
Detail Level: Detailed
30

## 2022-06-10 NOTE — BH CONSULTATION LIAISON PROGRESS NOTE - NSCDBILL_PSY_A_CORE
[id number] - Inpatient, moderate complexity
[id number] - Inpatient, moderate complexity
Time based billing
[id number] - Inpatient, moderate complexity
[id number] - Inpatient, moderate complexity

## 2022-06-10 NOTE — PROGRESS NOTE ADULT - ASSESSMENT
78 y/o F with pmhx of recently diagnosed breast Ca on chemo first session 5/27/2022, anxiety, COPD, HTN, HLD, presented to the ED for new onset worsening confusion and vague symptoms. Labs show leukopenia and hypokalemia but otherwise wnl. Admit for work up of confusion and weakness, concerning for dementia vs. toxic/metabolic/infectious encephalopathy. Pt with found to have Bilateral choroid plexus cyst are seen on  MRI brain.

## 2022-06-10 NOTE — BH CONSULTATION LIAISON PROGRESS NOTE - NSBHCONSULTRECOMMENDOTHER_PSY_A_CORE FT
Patient should only be discharged home if HHA is set in place. Previous recommendation was patient to go to geriatric [facility]. This recommendation is less likely to be our final recommendation as patient has been making improvements in memory and mood.

## 2022-06-10 NOTE — PROGRESS NOTE ADULT - ASSESSMENT
78 yo F with recently diagnosed breast Ca on chemo with CMF +Onpro, s/p cycle 1 on 5/27/2022, and h/o anxiety, COPD, HTN, HLD, presented to the ED for new onset worsening confusion and dizziness    -Confusion may be due to recent chemo with steroids as well as underlying dementia  -No evidence infection  -CT scan head unremarkable  -MRI brain: Bilateral choroid plexus cyst are seen.  -Cytopenias improved, elevated ANC at least in part 2/2 neulasta received on 5/28  -Continue supportive care including nutritional support   -DVT ppx   -No plan for inpatient chemo  -She will follow up with her primary oncologist Dr. Marilyn Jain at [facility] following discharge     Will follow. Please do not hesitate to call back with questions.     Yun Quiñones MD  Medical Oncology Attending  C: [phone] 76 yo F with recently diagnosed breast Ca on chemo with CMF +Onpro, s/p cycle 1 on 5/27/2022, and h/o anxiety, COPD, HTN, HLD, presented to the ED for new onset worsening confusion and dizziness    -No evidence infection  -CT scan head unremarkable  -MRI brain: Bilateral choroid plexus cyst are seen. Neurology follow up.  -Cytopenias improved, elevated ANC at least in part 2/2 neulasta received on 5/28  -Continue supportive care including nutritional support   -DVT ppx   -No plan for inpatient chemo  -She will follow up with her primary oncologist Dr. Marilyn Jain at [facility] [place] [facility] following discharge     Will follow. Please do not hesitate to call back with questions.     Yun Quiñones MD  Medical Oncology Attending  C: [phone]

## 2022-06-10 NOTE — BH CONSULTATION LIAISON PROGRESS NOTE - CURRENT MEDICATION
MEDICATIONS  (STANDING):  atorvastatin 20 milliGRAM(s) Oral at bedtime  heparin   Injectable 5000 Unit(s) SubCutaneous every 12 hours  lactated ringers. 1000 milliLiter(s) (75 mL/Hr) IV Continuous <Continuous>  mirtazapine 45 milliGRAM(s) Oral at bedtime  pantoprazole    Tablet 40 milliGRAM(s) Oral before breakfast  QUEtiapine 125 milliGRAM(s) Oral at bedtime  valsartan 160 milliGRAM(s) Oral daily  venlafaxine XR. 37.5 milliGRAM(s) Oral daily    MEDICATIONS  (PRN):  acetaminophen     Tablet .. 650 milliGRAM(s) Oral every 6 hours PRN Temp greater or equal to 38C (100.4F), Mild Pain (1 - 3)  ALPRAZolam 0.5 milliGRAM(s) Oral two times a day PRN anxious  melatonin 3 milliGRAM(s) Oral at bedtime PRN Insomnia  
MEDICATIONS  (STANDING):  atorvastatin 20 milliGRAM(s) Oral at bedtime  heparin   Injectable 5000 Unit(s) SubCutaneous every 12 hours  lactated ringers. 1000 milliLiter(s) (75 mL/Hr) IV Continuous <Continuous>  mirtazapine 45 milliGRAM(s) Oral at bedtime  pantoprazole    Tablet 40 milliGRAM(s) Oral before breakfast  potassium phosphate / sodium phosphate Powder (PHOS-NaK) 1 Packet(s) Oral two times a day  valsartan 160 milliGRAM(s) Oral daily    MEDICATIONS  (PRN):  acetaminophen     Tablet .. 650 milliGRAM(s) Oral every 6 hours PRN Temp greater or equal to 38C (100.4F), Mild Pain (1 - 3)  ALPRAZolam 0.5 milliGRAM(s) Oral two times a day PRN anxious  melatonin 3 milliGRAM(s) Oral at bedtime PRN Insomnia  
MEDICATIONS  (STANDING):  atorvastatin 20 milliGRAM(s) Oral at bedtime  heparin   Injectable 5000 Unit(s) SubCutaneous every 12 hours  lactated ringers. 1000 milliLiter(s) (75 mL/Hr) IV Continuous <Continuous>  mirtazapine 45 milliGRAM(s) Oral at bedtime  pantoprazole    Tablet 40 milliGRAM(s) Oral before breakfast  QUEtiapine 100 milliGRAM(s) Oral at bedtime  valsartan 160 milliGRAM(s) Oral daily  venlafaxine XR. 37.5 milliGRAM(s) Oral daily    MEDICATIONS  (PRN):  acetaminophen     Tablet .. 650 milliGRAM(s) Oral every 6 hours PRN Temp greater or equal to 38C (100.4F), Mild Pain (1 - 3)  ALPRAZolam 0.5 milliGRAM(s) Oral two times a day PRN anxious  melatonin 3 milliGRAM(s) Oral at bedtime PRN Insomnia  
MEDICATIONS  (STANDING):  artificial tears (preservative free) Ophthalmic Solution 1 Drop(s) Both EYES three times a day  atorvastatin 20 milliGRAM(s) Oral at bedtime  heparin   Injectable 5000 Unit(s) SubCutaneous every 12 hours  hydrochlorothiazide 12.5 milliGRAM(s) Oral daily  magnesium oxide 400 milliGRAM(s) Oral three times a day with meals  mirtazapine 45 milliGRAM(s) Oral at bedtime  pantoprazole    Tablet 40 milliGRAM(s) Oral before breakfast  QUEtiapine 125 milliGRAM(s) Oral at bedtime  valsartan 160 milliGRAM(s) Oral daily  venlafaxine XR. 75 milliGRAM(s) Oral daily    MEDICATIONS  (PRN):  acetaminophen     Tablet .. 650 milliGRAM(s) Oral every 6 hours PRN Temp greater or equal to 38C (100.4F), Mild Pain (1 - 3)  ALPRAZolam 0.5 milliGRAM(s) Oral two times a day PRN anxious  melatonin 3 milliGRAM(s) Oral at bedtime PRN Insomnia  
MEDICATIONS  (STANDING):  atorvastatin 20 milliGRAM(s) Oral at bedtime  heparin   Injectable 5000 Unit(s) SubCutaneous every 12 hours  lactated ringers. 1000 milliLiter(s) (75 mL/Hr) IV Continuous <Continuous>  mirtazapine 45 milliGRAM(s) Oral at bedtime  pantoprazole    Tablet 40 milliGRAM(s) Oral before breakfast  QUEtiapine 125 milliGRAM(s) Oral at bedtime  valsartan 160 milliGRAM(s) Oral daily  venlafaxine XR. 75 milliGRAM(s) Oral daily    MEDICATIONS  (PRN):  acetaminophen     Tablet .. 650 milliGRAM(s) Oral every 6 hours PRN Temp greater or equal to 38C (100.4F), Mild Pain (1 - 3)  ALPRAZolam 0.5 milliGRAM(s) Oral two times a day PRN anxious  melatonin 3 milliGRAM(s) Oral at bedtime PRN Insomnia

## 2022-06-10 NOTE — BH CONSULTATION LIAISON PROGRESS NOTE - NSBHCONSULTFOLLOWAFTERCARE_PSY_A_CORE FT
should f/u with  re home resources prior to dc  [facility] geriatric psych [phone]    Will make [facility] psych appt closer to DC for patient with daughter Kala ([phone], [email or web address])

## 2022-06-10 NOTE — DIETITIAN INITIAL EVALUATION ADULT - PERTINENT LABORATORY DATA
06-10    140  |  104  |  9   ----------------------------<  80  3.8   |  27  |  0.84    Ca    8.8      10 Bandar 2022 07:49  Phos  3.1     06-10  Mg     1.70     06-10

## 2022-06-10 NOTE — PROGRESS NOTE ADULT - PROBLEM SELECTOR PLAN 8
- heparin subq for dvt ppx  Dispo; PT: home; no skilled PT needs. pending psych clearance [facility] vs. home

## 2022-06-10 NOTE — BH CONSULTATION LIAISON PROGRESS NOTE - NSBHFUPINTERVALHXFT_PSY_A_CORE
Patient feels well. She states that she is ready to go home. When asked if she would be able to take care of herself, she states that people would be there to help her. She is amenable to a HHA. Patient states that her mood is a lot better, and does not feel any sadness, or hopelessness, but does feel anxiety. Patient denies SI/H AH/VH delusions or paranoia. Patient states energy is a lot better, she is sleeping well, and she has a normal appetite. Patient still has weakness and is scared of her chemotherapy which was supposed to be administered today (6/10). Patient is AOx3. Patient is scared of being sick and getting old, she says that the legal documents should have been taken care of but didn't expect to be this sick so quickly. Patient reveals that mother had AD and was "a vegetable" the last two years of her life. Patient states that she knows at this point her only choices are to "get back to normal life or stay sick." Patient is frustrated with memory loss but believes she is feeling better overall and especially since coming to the hospital. Patient is also frustrated with hospital stay stating this is the worst hospital she has been in.     Collateral from Daughter- Deborah ([phone])  Daughter states that she has been talking to patient and she seems "100% better" and "night and day difference." Patient does not have an email and does not use computers but daughter's email is [email or web address] for outpatient followup.

## 2022-06-10 NOTE — DIETITIAN INITIAL EVALUATION ADULT - ADD RECOMMEND
1. Encourage & assist Pt with meals; Monitor PO diet tolerance; Honor food preferences;  2. Continue Remeron ad ordered;   3. Monitor labs, hydration status;

## 2022-06-10 NOTE — PROGRESS NOTE ADULT - PROBLEM SELECTOR PLAN 5
- heme/onc consulted: no plan for chemo as inpatient; pt to [place] [facility] [place]

## 2022-06-10 NOTE — DIETITIAN INITIAL EVALUATION ADULT - NS FNS DIET ORDER
Regular: Consistent Carbohydrate {Evening Snack} (CSTCHOSN)  DASH/TLC {Sodium & Cholesterol Restricted} (DASH) (06-08-22 @ 13:05) [Active]

## 2022-06-10 NOTE — BH CONSULTATION LIAISON PROGRESS NOTE - NSBHASSESSMENTFT_PSY_ALL_CORE
Patient is a 76 y/o  F, living in her own house with daughter, with pmhx of recently diagnosed breast Ca on chemo first session 5/27/2022, anxiety, COPD, HTN, HLD, presented to the ED for new onset worsening confusion and vague symptoms. Labs show leukocytosis (leukopenia on admission 6/3).     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 Patient scored 20/30 on MoCA (6/6)  After starting Remeron, patient is observed to be more animated and awake. Patient should be treated for dementia and undergo full dementia workup and workup for brain mets given active cancer.    6/7 - improvement in mood, tolerating effexor, still has cognitive deficits in short term memory though attention improved     6/8- improvement in affect, tolerating effexor, attention improved,  On observation patient used to answer more questions with "I don't know" but as time is going on patient has been answering less questions with that response. Patient is aware of cognitive/memory decline. Still very anxious, depressed    6/9- patient has improvement in mood, patient feels ready to go home and doesn't feel sick enough to stay in hospital, patient amenable to [facility] to help her with medication taking/getting to chemo appointments    6/10- patient has stabilized mood, still anxious but ready to go home, patient feels less weak but is scared on how to take care of herself once she goes home, patient is scared of side effects of chemotherapy (missed appointment 6/10 for inpatient stay)    PLAN  - patient with no SI or HI, no hx of SA, no psychosis, help seeking   - CONTINUE home medications  -- continue Remeron 45mg qhs  -- continue Seroquel to 125mg qhs ** if qtc < 500  - continue Effexor XR 75 PO AM  ---- antipsychotics can only be given if qtc < 500   - Can make xanax 0.5mg a PRN as patient does not use this daily at home -> can give xanax 0.5mg BID PRN  ---(istop Reference #: [id number])  - labs: b12, folate, tsh  - neuro c/s appreciated   - given risk of brain mets consider MRI with/without contrast   - may need [facility] admission as patient's depression/anxiety is effecting judgement, cognition, and ability to tolerate chemo    Patient is a 76 y/o  F, living in her own house with daughter, with pmhx of recently diagnosed breast Ca on chemo first session 5/27/2022, anxiety, COPD, HTN, HLD, presented to the ED for new onset worsening confusion and vague symptoms. Labs show leukocytosis (leukopenia on admission 6/3).     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 Patient scored 20/30 on MoCA (6/6)  After starting Remeron, patient is observed to be more animated and awake. Patient should be treated for dementia and undergo full dementia workup and workup for brain mets given active cancer.    6/7 - improvement in mood, tolerating effexor, still has cognitive deficits in short term memory though attention improved     6/8- improvement in affect, tolerating effexor, attention improved,  On observation patient used to answer more questions with "I don't know" but as time is going on patient has been answering less questions with that response. Patient is aware of cognitive/memory decline. Still very anxious, depressed    6/9- patient has improvement in mood, patient feels ready to go home and doesn't feel sick enough to stay in hospital, patient amenable to [facility] to help her with medication taking/getting to chemo appointments    6/10- patient has stabilized mood, still anxious but ready to go home, patient feels less weak but is scared on how to take care of herself once she goes home, patient is scared of side effects of chemotherapy (missed appointment 6/10 for inpatient stay). Per daughter patient has improved greatly with regards to mood. Bifrontal lesions on MRI, given frontal location may be contributing to some degree of memory loss/cognitive decline, defer to medicine regarding their importance or concern for mets.     Dx: Likely underlying dementia with superimposed depression/pseudodementia vs. cognitive deficits in context of frontal brain lesions     PLAN  - patient with no SI or HI, no hx of SA, no psychosis, help seeking   -- continue Remeron 45mg qhs  -- Increase Seroquel to 150mg qhs ** if qtc < 500  - continue Effexor XR 75mg  PO AM  - EKG for QTc   - REDUCE Xanax PRN to 0.25mg BID PRN   - neuro c/s appreciated   - no longer needing [facility] admission, mood improved, suspect some underlying dementia, should f/u with [facility] geriatric psych [phone]

## 2022-06-10 NOTE — PROGRESS NOTE ADULT - SUBJECTIVE AND OBJECTIVE BOX
[facility]  Division of Hospital Medicine  Becky Napoles MD  Pager: [phone]      Patient is a 77y old  Female who presents with a chief complaint of confusion (2022 12:37)      SUBJECTIVE / OVERNIGHT EVENTS: patient examined at bedside. No medical concerns. Calm and went down for MRI this am.   ADDITIONAL REVIEW OF SYSTEMS:    MEDICATIONS  (STANDING):  artificial tears (preservative free) Ophthalmic Solution 1 Drop(s) Both EYES three times a day  atorvastatin 20 milliGRAM(s) Oral at bedtime  heparin   Injectable 5000 Unit(s) SubCutaneous every 12 hours  magnesium oxide 400 milliGRAM(s) Oral three times a day with meals  mirtazapine 45 milliGRAM(s) Oral at bedtime  pantoprazole    Tablet 40 milliGRAM(s) Oral before breakfast  QUEtiapine 125 milliGRAM(s) Oral at bedtime  valsartan 160 milliGRAM(s) Oral daily  venlafaxine XR. 75 milliGRAM(s) Oral daily    MEDICATIONS  (PRN):  acetaminophen     Tablet .. 650 milliGRAM(s) Oral every 6 hours PRN Temp greater or equal to 38C (100.4F), Mild Pain (1 - 3)  ALPRAZolam 0.5 milliGRAM(s) Oral two times a day PRN anxious  melatonin 3 milliGRAM(s) Oral at bedtime PRN Insomnia      CAPILLARY BLOOD GLUCOSE        I&O's Summary      PHYSICAL EXAM:  Vital Signs Last 24 Hrs  T(C): 36.7 (10 Bandar 2022 05:36), Max: 36.7 (2022 12:41)  T(F): 98 (10 Bandar 2022 05:36), Max: 98.1 (2022 21:34)  HR: 94 (10 Bnadar 2022 05:36) (89 - 94)  BP: 158/83 (10 Bandar 2022 05:36) (128/65 - 160/97)  BP(mean): --  RR: 18 (10 Bandar 2022 05:36) (18 - 18)  SpO2: 95% (10 Bandar 2022 05:36) (94% - 95%)  CONSTITUTIONAL: Elderly woman in NAD, well-developed, well-groomed  RESPIRATORY: Normal respiratory effort; lungs are clear to auscultation bilaterally  CARDIOVASCULAR: Regular rate and rhythm, normal S1 and S2, no murmur/rub/gallop; No lower extremity edema; Peripheral pulses are 2+ bilaterally  ABDOMEN: Nontender to palpation, normoactive bowel sounds, no rebound/guarding; No hepatosplenomegaly  PSYCH: A+O to person, place, and time; -  and 2022  affect appropriate  NEUROLOGY:  no gross sensory deficits   SKIN: No rashes; no palpable lesions    LABS:                        11.6   14.51 )-----------( 120      ( 10 Bandar 2022 07:49 )             36.7     06-10    140  |  104  |  9   ----------------------------<  80  3.8   |  27  |  0.84    Ca    8.8      10 Bandar 2022 07:49  Phos  3.1     10  Mg     1.70     10            Urinalysis Basic - ( 2022 07:55 )    Color: Colorless / Appearance: Clear / S.008 / pH: x  Gluc: x / Ketone: Negative  / Bili: Negative / Urobili: <2 mg/dL   Blood: x / Protein: Negative / Nitrite: Negative   Leuk Esterase: Negative / RBC: x / WBC x   Sq Epi: x / Non Sq Epi: x / Bacteria: x          RADIOLOGY & ADDITIONAL TESTS:  Results Reviewed: < from: MR Head w/wo IV Cont (06.10.22 @ 09:11) >    Bilateral choroid plexus cyst are seen.    < end of copied text >    Imaging Personally Reviewed:  Electrocardiogram Personally Reviewed:    COORDINATION OF CARE:  Care Discussed with Consultants/Other Providers [Y/N]:  Prior or Outpatient Records Reviewed [Y/N]:

## 2022-06-10 NOTE — PROGRESS NOTE ADULT - PROBLEM SELECTOR PLAN 2
Assessment:  - patient presents for worsening confusion and forgetfulness after chemotherapy.  Intermittent period of confusion, improving: possible underlying dementia as reversible causes of dementia unremarkable  Bcx NTD, ucx   - Apprec Psych rec:  TSH: wnl  B12: elevated , folate: wnl; f/u  RPR, antithyroglobulin Ab, TPO Ab, vitamin B1, B6,  - Procal neg   - CXR neg, CT head unremarkable   - F/u  Remeron 45mg qhs, Seroquel 100mg qhs -> increased seroquel to 125mg qhs and effexor 75mg   - Apprec hem/onc rec: no chemo inpatient,    - Neurology consulted, appreciate recs Will f/u  MRI brain - ordered on 6/7: Bilateral choroid plexus cyst are seen.

## 2022-06-10 NOTE — BH CONSULTATION LIAISON PROGRESS NOTE - NSBHCONSULTPRIMARYDISCUSSYES_PSY_A_CORE FT
SW f/u to discussed home resources with family, patient appears to have some continued cognitive limitations particularly memory issues despite resolution of deprsion/anxiety

## 2022-06-10 NOTE — PROGRESS NOTE ADULT - SUBJECTIVE AND OBJECTIVE BOX
INTERVAL HPI/OVERNIGHT EVENTS:  Patient seen at bedside.      VITAL SIGNS:  T(F): 98 (06-10-22 @ 05:36)  HR: 94 (06-10-22 @ 05:36)  BP: 158/83 (06-10-22 @ 05:36)  RR: 18 (06-10-22 @ 05:36)  SpO2: 95% (06-10-22 @ 05:36)  Wt(kg): --    PHYSICAL EXAM:    Constitutional: NAD, resting in bed comfortably  Eyes: EOMI, sclera non-icteric  Neck: supple, no LAP  Respiratory: CTA b/l, good air entry b/l, no wheezing, rhonchi or crackels  Cardiovascular: RRR, normal S1S2, no M/R/G  Gastrointestinal: soft, NTND  Extremities: no edema  Neurological: AAOx3, non focal  Skin: Normal temperature    MEDICATIONS  (STANDING):  artificial tears (preservative free) Ophthalmic Solution 1 Drop(s) Both EYES three times a day  atorvastatin 20 milliGRAM(s) Oral at bedtime  heparin   Injectable 5000 Unit(s) SubCutaneous every 12 hours  magnesium oxide 400 milliGRAM(s) Oral three times a day with meals  mirtazapine 45 milliGRAM(s) Oral at bedtime  pantoprazole    Tablet 40 milliGRAM(s) Oral before breakfast  QUEtiapine 125 milliGRAM(s) Oral at bedtime  valsartan 160 milliGRAM(s) Oral daily  venlafaxine XR. 75 milliGRAM(s) Oral daily    MEDICATIONS  (PRN):  acetaminophen     Tablet .. 650 milliGRAM(s) Oral every 6 hours PRN Temp greater or equal to 38C (100.4F), Mild Pain (1 - 3)  ALPRAZolam 0.5 milliGRAM(s) Oral two times a day PRN anxious  melatonin 3 milliGRAM(s) Oral at bedtime PRN Insomnia      Allergies    penicillin (Hives)    Intolerances        LABS:                        11.6   14.51 )-----------( 120      ( 10 Bandar 2022 07:49 )             36.7     06-10    140  |  104  |  9   ----------------------------<  80  3.8   |  27  |  0.84    Ca    8.8      10 Bandar 2022 07:49  Phos  3.1     06-10  Mg     1.70     06-10        Urinalysis Basic - ( 2022 07:55 )    Color: Colorless / Appearance: Clear / S.008 / pH: x  Gluc: x / Ketone: Negative  / Bili: Negative / Urobili: <2 mg/dL   Blood: x / Protein: Negative / Nitrite: Negative   Leuk Esterase: Negative / RBC: x / WBC x   Sq Epi: x / Non Sq Epi: x / Bacteria: x        RADIOLOGY & ADDITIONAL TESTS:  Studies reviewed.

## 2022-06-10 NOTE — BH CONSULTATION LIAISON PROGRESS NOTE - NSBHPSYCHOLCOGORIENT_PSY_A_CORE
Oriented to time, place, person, situation
Not fully oriented...
Not fully oriented...

## 2022-06-10 NOTE — DIETITIAN INITIAL EVALUATION ADULT - NSICDXPASTSURGICALHX_GEN_ALL_CORE_FT
PROCEDURE: Ultrasound-guided paracentesis    COMPARISON: None    HISTORY: Ascites., R41.82 Altered mental status, unspecified U07.1 COVID-19 A41.9 Sepsis, unspecified organism R65.20 Severe sepsis without septic shock G93.40 Encephalopathy, unspecified R60.0 Localized edema.    TECHNIQUE:  The risks, benefits and alternatives were explained to the patient who had ample opportunity to ask questions. The patient agreed to proceed and informed consent was obtained.    An adequate fluid pocket was identified in the right lower quadrant. The site was marked then prepped and draped in sterile fashion. Approximately 10 mL of 2% lidocaine solution was used for local superficial and deep anesthesia. A 5 Divehi Yueh needle was inserted into the peritoneal cavity under sonographic guidance.    FINDINGS:   Approximately 7800 mL of clear, serous fluid was removed. There were no immediate post procedure complications.    Blood loss: none    CONCLUSION:    Successful ultrasound-guided paracentesis, as described above.   PAST SURGICAL HISTORY:  Fracture of toe of left foot repair    H/O colonoscopy     H/O tubal ligation     History of lumpectomy of left breast     S/P cataract surgery

## 2022-06-10 NOTE — DIETITIAN INITIAL EVALUATION ADULT - OTHER INFO
Pt 78 yo female with PMHx of recently diagnosed breast Ca on chemo first session 5/27/2022, anxiety, COPD, HTN, HLD, presented with worsening confusion and vague symptoms - per chart review.     At time of visit, Pt awake, alert. Per Pt her appetite good, but does not likes the hospital food. Pt ate ~100% of breakfast this morning per tray waste observation. RDN offered to discuss food preferences, but Pt with no interest. No report of chewing or swallowing difficulty; no report of nausea, vomiting or diarrhea @ this time. +BM (6/8) per flow sheet. Per Pt her height: ~61" & her weight: ~160#; no report of weight loss or weight changes PTA.     Of note Pt's diet rx includes Consistent Carbohydrate restriction. Chart review, Pt with no hx of Diabetes Mellitus. Rec to liberalize Consistent Carbohydrate diet restriction. At home Pt does not follow any therapeutic diet restrictions reported. No other food related concern voiced @ present. RDN remains available, Pt made aware.

## 2022-06-11 LAB
ANION GAP SERPL CALC-SCNC: 8 MMOL/L — SIGNIFICANT CHANGE UP (ref 7–14)
BASOPHILS # BLD AUTO: 0.16 K/UL — SIGNIFICANT CHANGE UP (ref 0–0.2)
BASOPHILS NFR BLD AUTO: 1.1 % — SIGNIFICANT CHANGE UP (ref 0–2)
BUN SERPL-MCNC: 20 MG/DL — SIGNIFICANT CHANGE UP (ref 7–23)
CALCIUM SERPL-MCNC: 9.3 MG/DL — SIGNIFICANT CHANGE UP (ref 8.4–10.5)
CHLORIDE SERPL-SCNC: 104 MMOL/L — SIGNIFICANT CHANGE UP (ref 98–107)
CO2 SERPL-SCNC: 26 MMOL/L — SIGNIFICANT CHANGE UP (ref 22–31)
CREAT SERPL-MCNC: 0.87 MG/DL — SIGNIFICANT CHANGE UP (ref 0.5–1.3)
EGFR: 69 ML/MIN/1.73M2 — SIGNIFICANT CHANGE UP
EOSINOPHIL # BLD AUTO: 0.16 K/UL — SIGNIFICANT CHANGE UP (ref 0–0.5)
EOSINOPHIL NFR BLD AUTO: 1.1 % — SIGNIFICANT CHANGE UP (ref 0–6)
GLUCOSE SERPL-MCNC: 94 MG/DL — SIGNIFICANT CHANGE UP (ref 70–99)
HCT VFR BLD CALC: 37.7 % — SIGNIFICANT CHANGE UP (ref 34.5–45)
HGB BLD-MCNC: 12.3 G/DL — SIGNIFICANT CHANGE UP (ref 11.5–15.5)
IANC: 11.06 K/UL — HIGH (ref 1.8–7.4)
IMM GRANULOCYTES NFR BLD AUTO: 7.1 % — HIGH (ref 0–1.5)
LYMPHOCYTES # BLD AUTO: 1.53 K/UL — SIGNIFICANT CHANGE UP (ref 1–3.3)
LYMPHOCYTES # BLD AUTO: 10.4 % — LOW (ref 13–44)
MAGNESIUM SERPL-MCNC: 1.8 MG/DL — SIGNIFICANT CHANGE UP (ref 1.6–2.6)
MCHC RBC-ENTMCNC: 28.9 PG — SIGNIFICANT CHANGE UP (ref 27–34)
MCHC RBC-ENTMCNC: 32.6 GM/DL — SIGNIFICANT CHANGE UP (ref 32–36)
MCV RBC AUTO: 88.5 FL — SIGNIFICANT CHANGE UP (ref 80–100)
MONOCYTES # BLD AUTO: 0.76 K/UL — SIGNIFICANT CHANGE UP (ref 0–0.9)
MONOCYTES NFR BLD AUTO: 5.2 % — SIGNIFICANT CHANGE UP (ref 2–14)
NEUTROPHILS # BLD AUTO: 11.06 K/UL — HIGH (ref 1.8–7.4)
NEUTROPHILS NFR BLD AUTO: 75.1 % — SIGNIFICANT CHANGE UP (ref 43–77)
NRBC # BLD: 0 /100 WBCS — SIGNIFICANT CHANGE UP
NRBC # FLD: 0 K/UL — SIGNIFICANT CHANGE UP
PHOSPHATE SERPL-MCNC: 3.4 MG/DL — SIGNIFICANT CHANGE UP (ref 2.5–4.5)
PLATELET # BLD AUTO: 165 K/UL — SIGNIFICANT CHANGE UP (ref 150–400)
POTASSIUM SERPL-MCNC: 3.9 MMOL/L — SIGNIFICANT CHANGE UP (ref 3.5–5.3)
POTASSIUM SERPL-SCNC: 3.9 MMOL/L — SIGNIFICANT CHANGE UP (ref 3.5–5.3)
RBC # BLD: 4.26 M/UL — SIGNIFICANT CHANGE UP (ref 3.8–5.2)
RBC # FLD: 15.4 % — HIGH (ref 10.3–14.5)
SODIUM SERPL-SCNC: 138 MMOL/L — SIGNIFICANT CHANGE UP (ref 135–145)
WBC # BLD: 14.71 K/UL — HIGH (ref 3.8–10.5)
WBC # FLD AUTO: 14.71 K/UL — HIGH (ref 3.8–10.5)

## 2022-06-11 PROCEDURE — 99232 SBSQ HOSP IP/OBS MODERATE 35: CPT

## 2022-06-11 RX ADMIN — Medication 1 DROP(S): at 05:46

## 2022-06-11 RX ADMIN — Medication 1 DROP(S): at 13:21

## 2022-06-11 RX ADMIN — MAGNESIUM OXIDE 400 MG ORAL TABLET 400 MILLIGRAM(S): 241.3 TABLET ORAL at 08:17

## 2022-06-11 RX ADMIN — HEPARIN SODIUM 5000 UNIT(S): 5000 INJECTION INTRAVENOUS; SUBCUTANEOUS at 18:59

## 2022-06-11 RX ADMIN — PANTOPRAZOLE SODIUM 40 MILLIGRAM(S): 20 TABLET, DELAYED RELEASE ORAL at 05:46

## 2022-06-11 RX ADMIN — Medication 1 DROP(S): at 21:09

## 2022-06-11 RX ADMIN — MIRTAZAPINE 45 MILLIGRAM(S): 45 TABLET, ORALLY DISINTEGRATING ORAL at 21:10

## 2022-06-11 RX ADMIN — Medication 75 MILLIGRAM(S): at 13:22

## 2022-06-11 RX ADMIN — VALSARTAN 160 MILLIGRAM(S): 80 TABLET ORAL at 05:46

## 2022-06-11 RX ADMIN — ATORVASTATIN CALCIUM 20 MILLIGRAM(S): 80 TABLET, FILM COATED ORAL at 21:09

## 2022-06-11 RX ADMIN — HEPARIN SODIUM 5000 UNIT(S): 5000 INJECTION INTRAVENOUS; SUBCUTANEOUS at 05:46

## 2022-06-11 RX ADMIN — QUETIAPINE FUMARATE 150 MILLIGRAM(S): 200 TABLET, FILM COATED ORAL at 21:10

## 2022-06-11 RX ADMIN — Medication 12.5 MILLIGRAM(S): at 07:05

## 2022-06-11 NOTE — PROGRESS NOTE ADULT - PROBLEM SELECTOR PLAN 7
- Orthostatics neg   - At home on valsartan-HTZ 320mg-12.5mg   - Renal function wnl. continue  Valsartan at 160mg and titrate up if needed, will add HTZ if further BP control needed  - BP above goal - re-start HCTZ on 6/10

## 2022-06-11 NOTE — PROGRESS NOTE ADULT - PROBLEM SELECTOR PLAN 8
- heparin subq for dvt ppx  Dispo; PT: home; no skilled PT needs. pending psych clearance [facility] vs. home

## 2022-06-11 NOTE — PROGRESS NOTE ADULT - SUBJECTIVE AND OBJECTIVE BOX
[facility]  Division of Hospital Medicine  Becky Napoles MD  Pager: [phone]      Patient is a 77y old  Female who presents with a chief complaint of confusion (10 Bandar 2022 12:11)      SUBJECTIVE / OVERNIGHT EVENTS: Pt examined at bedside. Reports being depressed. Otherwise, no chest pain, dizziness.   ADDITIONAL REVIEW OF SYSTEMS:    MEDICATIONS  (STANDING):  artificial tears (preservative free) Ophthalmic Solution 1 Drop(s) Both EYES three times a day  atorvastatin 20 milliGRAM(s) Oral at bedtime  heparin   Injectable 5000 Unit(s) SubCutaneous every 12 hours  hydrochlorothiazide 12.5 milliGRAM(s) Oral daily  mirtazapine 45 milliGRAM(s) Oral at bedtime  pantoprazole    Tablet 40 milliGRAM(s) Oral before breakfast  QUEtiapine 150 milliGRAM(s) Oral at bedtime  valsartan 160 milliGRAM(s) Oral daily  venlafaxine XR. 75 milliGRAM(s) Oral daily    MEDICATIONS  (PRN):  acetaminophen     Tablet .. 650 milliGRAM(s) Oral every 6 hours PRN Temp greater or equal to 38C (100.4F), Mild Pain (1 - 3)  ALPRAZolam 0.25 milliGRAM(s) Oral two times a day PRN anxious  melatonin 3 milliGRAM(s) Oral at bedtime PRN Insomnia      CAPILLARY BLOOD GLUCOSE        I&O's Summary      PHYSICAL EXAM:  Vital Signs Last 24 Hrs  T(C): 36.6 (11 Jun 2022 12:34), Max: 36.8 (10 Bandar 2022 22:16)  T(F): 97.9 (11 Jun 2022 12:34), Max: 98.2 (10 Bandar 2022 22:16)  HR: 99 (11 Jun 2022 12:34) (89 - 99)  BP: 149/86 (11 Jun 2022 12:34) (132/78 - 155/93)  BP(mean): --  RR: 18 (11 Jun 2022 12:34) (18 - 18)  SpO2: 94% (11 Jun 2022 12:34) (94% - 96%)  CONSTITUTIONAL: Elderly woman in NAD, well-developed, well-groomed  RESPIRATORY: Normal respiratory effort; lungs are clear to auscultation bilaterally  CARDIOVASCULAR: Regular rate and rhythm, normal S1 and S2, no murmur/rub/gallop; No lower extremity edema; Peripheral pulses are 2+ bilaterally  ABDOMEN: Nontender to palpation, normoactive bowel sounds, no rebound/guarding; No hepatosplenomegaly  PSYCH: A+O to person, place, and time; - [facility] and June 2022  affect appropriate  NEUROLOGY:  no gross sensory deficits   SKIN: No rashes; no palpable lesions    LABS:                        12.3   14.71 )-----------( 165      ( 11 Jun 2022 08:05 )             37.7     06-11    138  |  104  |  20  ----------------------------<  94  3.9   |  26  |  0.87    Ca    9.3      11 Jun 2022 08:05  Phos  3.4     06-11  Mg     1.80     06-11                  RADIOLOGY & ADDITIONAL TESTS:  Results Reviewed:   Imaging Personally Reviewed:  Electrocardiogram Personally Reviewed:    COORDINATION OF CARE:  Care Discussed with Consultants/Other Providers [Y/N]:  Prior or Outpatient Records Reviewed [Y/N]:

## 2022-06-11 NOTE — PROGRESS NOTE ADULT - PROBLEM SELECTOR PLAN 5
- heme/onc consulted: no plan for chemo as inpatient; pt to f/u with her med oncologist Marilyn Putnam at [facility] after d/c

## 2022-06-12 ENCOUNTER — TRANSCRIPTION ENCOUNTER (OUTPATIENT)
Age: 77
End: 2022-06-12

## 2022-06-12 VITALS
RESPIRATION RATE: 16 BRPM | HEART RATE: 99 BPM | OXYGEN SATURATION: 100 % | SYSTOLIC BLOOD PRESSURE: 155 MMHG | TEMPERATURE: 98 F | DIASTOLIC BLOOD PRESSURE: 90 MMHG

## 2022-06-12 LAB
ANION GAP SERPL CALC-SCNC: 12 MMOL/L — SIGNIFICANT CHANGE UP (ref 7–14)
BASOPHILS # BLD AUTO: 0.15 K/UL — SIGNIFICANT CHANGE UP (ref 0–0.2)
BASOPHILS NFR BLD AUTO: 0.9 % — SIGNIFICANT CHANGE UP (ref 0–2)
BUN SERPL-MCNC: 20 MG/DL — SIGNIFICANT CHANGE UP (ref 7–23)
CALCIUM SERPL-MCNC: 9.3 MG/DL — SIGNIFICANT CHANGE UP (ref 8.4–10.5)
CHLORIDE SERPL-SCNC: 105 MMOL/L — SIGNIFICANT CHANGE UP (ref 98–107)
CO2 SERPL-SCNC: 25 MMOL/L — SIGNIFICANT CHANGE UP (ref 22–31)
CREAT SERPL-MCNC: 0.91 MG/DL — SIGNIFICANT CHANGE UP (ref 0.5–1.3)
EGFR: 65 ML/MIN/1.73M2 — SIGNIFICANT CHANGE UP
EOSINOPHIL # BLD AUTO: 0.13 K/UL — SIGNIFICANT CHANGE UP (ref 0–0.5)
EOSINOPHIL NFR BLD AUTO: 0.8 % — SIGNIFICANT CHANGE UP (ref 0–6)
GLUCOSE SERPL-MCNC: 86 MG/DL — SIGNIFICANT CHANGE UP (ref 70–99)
HCT VFR BLD CALC: 35.6 % — SIGNIFICANT CHANGE UP (ref 34.5–45)
HGB BLD-MCNC: 11.4 G/DL — LOW (ref 11.5–15.5)
IANC: 12.01 K/UL — HIGH (ref 1.8–7.4)
IMM GRANULOCYTES NFR BLD AUTO: 6.8 % — HIGH (ref 0–1.5)
LYMPHOCYTES # BLD AUTO: 1.86 K/UL — SIGNIFICANT CHANGE UP (ref 1–3.3)
LYMPHOCYTES # BLD AUTO: 11.6 % — LOW (ref 13–44)
MAGNESIUM SERPL-MCNC: 1.9 MG/DL — SIGNIFICANT CHANGE UP (ref 1.6–2.6)
MCHC RBC-ENTMCNC: 28.9 PG — SIGNIFICANT CHANGE UP (ref 27–34)
MCHC RBC-ENTMCNC: 32 GM/DL — SIGNIFICANT CHANGE UP (ref 32–36)
MCV RBC AUTO: 90.4 FL — SIGNIFICANT CHANGE UP (ref 80–100)
MONOCYTES # BLD AUTO: 0.73 K/UL — SIGNIFICANT CHANGE UP (ref 0–0.9)
MONOCYTES NFR BLD AUTO: 4.6 % — SIGNIFICANT CHANGE UP (ref 2–14)
NEUTROPHILS # BLD AUTO: 12.01 K/UL — HIGH (ref 1.8–7.4)
NEUTROPHILS NFR BLD AUTO: 75.3 % — SIGNIFICANT CHANGE UP (ref 43–77)
NRBC # BLD: 0 /100 WBCS — SIGNIFICANT CHANGE UP
NRBC # FLD: 0 K/UL — SIGNIFICANT CHANGE UP
PHOSPHATE SERPL-MCNC: 3.6 MG/DL — SIGNIFICANT CHANGE UP (ref 2.5–4.5)
PLATELET # BLD AUTO: 200 K/UL — SIGNIFICANT CHANGE UP (ref 150–400)
POTASSIUM SERPL-MCNC: 3.9 MMOL/L — SIGNIFICANT CHANGE UP (ref 3.5–5.3)
POTASSIUM SERPL-SCNC: 3.9 MMOL/L — SIGNIFICANT CHANGE UP (ref 3.5–5.3)
RBC # BLD: 3.94 M/UL — SIGNIFICANT CHANGE UP (ref 3.8–5.2)
RBC # FLD: 15.9 % — HIGH (ref 10.3–14.5)
SODIUM SERPL-SCNC: 142 MMOL/L — SIGNIFICANT CHANGE UP (ref 135–145)
WBC # BLD: 15.97 K/UL — HIGH (ref 3.8–10.5)
WBC # FLD AUTO: 15.97 K/UL — HIGH (ref 3.8–10.5)

## 2022-06-12 PROCEDURE — 99239 HOSP IP/OBS DSCHRG MGMT >30: CPT

## 2022-06-12 RX ORDER — ALPRAZOLAM 0.25 MG
1 TABLET ORAL
Qty: 6 | Refills: 0
Start: 2022-06-12 | End: 2022-06-14

## 2022-06-12 RX ORDER — VENLAFAXINE HCL 75 MG
1 CAPSULE, EXT RELEASE 24 HR ORAL
Qty: 30 | Refills: 0
Start: 2022-06-12 | End: 2022-07-11

## 2022-06-12 RX ORDER — ALPRAZOLAM 0.25 MG
1 TABLET ORAL
Qty: 0 | Refills: 0 | DISCHARGE

## 2022-06-12 RX ORDER — QUETIAPINE FUMARATE 200 MG/1
3 TABLET, FILM COATED ORAL
Qty: 90 | Refills: 0
Start: 2022-06-12 | End: 2022-07-11

## 2022-06-12 RX ORDER — QUETIAPINE FUMARATE 200 MG/1
1 TABLET, FILM COATED ORAL
Qty: 0 | Refills: 0 | DISCHARGE

## 2022-06-12 RX ADMIN — VALSARTAN 160 MILLIGRAM(S): 80 TABLET ORAL at 05:23

## 2022-06-12 RX ADMIN — Medication 75 MILLIGRAM(S): at 14:12

## 2022-06-12 RX ADMIN — Medication 1 DROP(S): at 05:24

## 2022-06-12 RX ADMIN — PANTOPRAZOLE SODIUM 40 MILLIGRAM(S): 20 TABLET, DELAYED RELEASE ORAL at 05:23

## 2022-06-12 RX ADMIN — HEPARIN SODIUM 5000 UNIT(S): 5000 INJECTION INTRAVENOUS; SUBCUTANEOUS at 05:31

## 2022-06-12 RX ADMIN — Medication 1 DROP(S): at 14:12

## 2022-06-12 RX ADMIN — Medication 12.5 MILLIGRAM(S): at 05:24

## 2022-06-12 NOTE — PROGRESS NOTE ADULT - PROBLEM SELECTOR PLAN 4
- Improving   - Replete as needed, monitor labs

## 2022-06-12 NOTE — PROGRESS NOTE ADULT - ASSESSMENT
78 y/o F with pmhx of recently diagnosed breast Ca on chemo first session 5/27/2022, anxiety, COPD, HTN, HLD, presented to the ED for new onset worsening confusion and vague symptoms. Labs show leukopenia and hypokalemia but otherwise wnl. Admit for work up of confusion and weakness, concerning for dementia vs. toxic/metabolic/infectious encephalopathy. Pt with found to have Bilateral choroid plexus cyst are seen on  MRI brain. 76 y/o F with pmhx of recently diagnosed breast Ca on chemo first session 5/27/2022, anxiety, COPD, HTN, HLD, presented to the ED for new onset worsening confusion and vague symptoms. Labs show leukopenia and hypokalemia but otherwise wnl. Admit for work up of confusion and weakness, concerning for Likely underlying dementia with superimposed depression/pseudodementia Pt with found to have Bilateral choroid plexus cyst are seen on  MRI brain.

## 2022-06-12 NOTE — DISCHARGE NOTE NURSING/CASE MANAGEMENT/SOCIAL WORK - NSDCPEFALRISK_GEN_ALL_CORE
For information on Fall & Injury Prevention, visit: [email or web address] OR  [email or web address] OR  https://www.cdc.gov/steadi/patient.html

## 2022-06-12 NOTE — PROGRESS NOTE ADULT - PROBLEM SELECTOR PLAN 1
- no neurological deficits noted at this time  - Ear exam wnl  - CT head neg   - tylenol prn

## 2022-06-12 NOTE — PROGRESS NOTE ADULT - PROBLEM SELECTOR PLAN 3
- patient has signs of depression, sadness and forgetfulness as per roommates  - denies suicidal and homicidal ideations  - Apprec psych rec: Remeron 45mg qhs, Seroquel 100mg qhs if qtc < 500  - Seroquel ordered ystrd but pt refused so held for now, QTC < 500 on EKG 6/4  -  consult for possible social issues (patient fight with daughter frequently) and if patient qualifies for [facility]
- patient has signs of depression, sadness and forgetfulness as per roommates  - denies suicidal and homicidal ideations  - Apprec psych rec: Remeron 45mg qhs, Seroquel 100mg qhs if qtc < 500  - Seroquel ordered ystrd but pt refused so held for now, QTC < 500 on EKG 6/4  -  consult for possible social issues (patient fight with daughter frequently) and if patient qualifies for [facility]
- patient has signs of depression, sadness and forgetfulness as per roommates  - denies suicidal and homicidal ideations  - Apprec psych rec: Remeron 45mg qhs, Seroquel 100mg qhs if qtc < 500  - Seroquel ordered ystrd but pt refused so held for now, QTC < 500 on EKG 6/4  -  consult for possible social issues (patient fight with daughter frequently) and if patient qualifies for [facility]
- patient has signs of depression, sadness and forgetfulness as per roommates  - denies suicidal and homicidal ideations  - Apprec psych rec: Remeron 45mg qhs, Seroquel 100mg qhs if qtc < 500  - Seroquel ordered ystrd but pt refused so held for now, QTC < 500 on EKG 6/4  -  consult for possible social issues (patient fight with daughter frequently) and if patient qual[facility] for [facility]
- patient has signs of depression, sadness and forgetfulness as per roommates  - denies suicidal and homicidal ideations  - Apprec psych rec: Remeron 45mg qhs, Seroquel 100mg qhs if qtc < 500  - Seroquel ordered ystrd but pt refused so held for now, QTC < 500 on EKG 6/4  -  consult for possible social issues (patient fight with daughter frequently) and if patient qualifies for [facility]
- patient has signs of depression, sadness and forgetfulness as per roommates  - denies suicidal and homicidal ideations  - Apprec psych rec: Remeron 45mg qhs, Seroquel 100mg qhs if qtc < 500  - Seroquel ordered ystrd but pt refused so held for now, QTC < 500 on EKG 6/4  -  consult for possible social issues (patient fight with daughter frequently) and if patient qualifies for [facility]
- patient has signs of depression, sadness and forgetfulness as per roommates  - denies suicidal and homicidal ideations  - Apprec psych rec: Remeron 45mg qhs, Seroquel 100mg qhs if qtc < 500  - Seroquel ordered ystrd but pt refused so held for now, QTC < 500 on EKG 6/4  -  consult for possible social issues (patient fight with daughter frequently)
- patient has signs of depression, sadness and forgetfulness as per roommates  - denies suicidal and homicidal ideations  - f/u psych consult  - c/w mirtazapine  - consider adding SSRI  - SW consult for possible social issues (patient fight with daughter frequently)
- patient has signs of depression, sadness and forgetfulness as per roommates  - denies suicidal and homicidal ideations  - Apprec psych rec: Remeron 45mg qhs, Seroquel 100mg qhs if qtc < 500  - Seroquel ordered ystrd but pt refused so held for now, QTC < 500 on EKG 6/4  -  consult for possible social issues (patient fight with daughter frequently)

## 2022-06-12 NOTE — PROGRESS NOTE ADULT - PROVIDER SPECIALTY LIST ADULT
Hospitalist
Heme/Onc
Heme/Onc
Hospitalist

## 2022-06-12 NOTE — DISCHARGE NOTE PROVIDER - NSDCFUSCHEDAPPT_GEN_ALL_CORE_FT
Lexie Yee  [facility]  [facility]urg[facility] [address]  Scheduled Appointment: 06/22/2022    [facility]  [facility]  Scheduled Appointment: 06/24/2022    Marilyn Jain  [facility]  [facility]  Scheduled Appointment: 06/24/2022    [facility]  [facility]  Scheduled Appointment: 07/08/2022    Mandi Carpio  [facility]  [facility]  Scheduled Appointment: 07/08/2022    Marilyn Jain  [facility]  [facility]  Scheduled Appointment: 07/13/2022

## 2022-06-12 NOTE — DISCHARGE NOTE NURSING/CASE MANAGEMENT/SOCIAL WORK - PATIENT PORTAL LINK FT
You can access the FollowMyHealth Patient Portal offered by [facility] by registering at the following website: [email or web address]. By joining [facility]’s FollowMyHealth portal, you will also be able to view your health information using other applications (apps) compatible with our system.

## 2022-06-12 NOTE — PROGRESS NOTE ADULT - PROBLEM SELECTOR PROBLEM 2
Metabolic encephalopathy

## 2022-06-12 NOTE — DISCHARGE NOTE PROVIDER - NSDCCPCAREPLAN_GEN_ALL_CORE_FT
PRINCIPAL DISCHARGE DIAGNOSIS  Diagnosis: Change in mental status  Assessment and Plan of Treatment: CT head , infectious workup, and MR head was unremarkable. MR head showed choroid plexus cysts which is an incidental finding, which is generally small and asymptomatic cysts. Please follow up with your PCP within one week of discharge.      SECONDARY DISCHARGE DIAGNOSES  Diagnosis: Anxiety  Assessment and Plan of Treatment: You were started on Effexor and your Seroquel dose was increased to 150mg at bedtime. You will benefit by scheduling an appointment with Dr. Mahajan, a geriatric psychiatrist at [facility]. Please call [phone].    Diagnosis: Acute hypokalemia  Assessment and Plan of Treatment: Your potassium level was low and you received potassium supplements . Your potassium level is now within normal limits.    Diagnosis: Generalized weakness  Assessment and Plan of Treatment:     Diagnosis: Breast cancer  Assessment and Plan of Treatment: Please follow up with Dr. Jain as scheduled.

## 2022-06-12 NOTE — PROGRESS NOTE ADULT - SUBJECTIVE AND OBJECTIVE BOX
[facility]  Division of Hospital Medicine  Becky Napoles MD  Pager: [phone]      Patient is a 77y old  Female who presents with a chief complaint of confusion (11 Jun 2022 12:36)      SUBJECTIVE / OVERNIGHT EVENTS: Left a message for daughter yesterday for d/c planning no call back/   ADDITIONAL REVIEW OF SYSTEMS:    MEDICATIONS  (STANDING):  artificial tears (preservative free) Ophthalmic Solution 1 Drop(s) Both EYES three times a day  atorvastatin 20 milliGRAM(s) Oral at bedtime  heparin   Injectable 5000 Unit(s) SubCutaneous every 12 hours  hydrochlorothiazide 12.5 milliGRAM(s) Oral daily  mirtazapine 45 milliGRAM(s) Oral at bedtime  pantoprazole    Tablet 40 milliGRAM(s) Oral before breakfast  QUEtiapine 150 milliGRAM(s) Oral at bedtime  valsartan 160 milliGRAM(s) Oral daily  venlafaxine XR. 75 milliGRAM(s) Oral daily    MEDICATIONS  (PRN):  acetaminophen     Tablet .. 650 milliGRAM(s) Oral every 6 hours PRN Temp greater or equal to 38C (100.4F), Mild Pain (1 - 3)  ALPRAZolam 0.25 milliGRAM(s) Oral two times a day PRN anxious  melatonin 3 milliGRAM(s) Oral at bedtime PRN Insomnia      CAPILLARY BLOOD GLUCOSE        I&O's Summary      PHYSICAL EXAM:  Vital Signs Last 24 Hrs  T(C): 36.7 (12 Jun 2022 05:00), Max: 36.7 (12 Jun 2022 05:00)  T(F): 98 (12 Jun 2022 05:00), Max: 98 (12 Jun 2022 05:00)  HR: 88 (12 Jun 2022 05:00) (88 - 99)  BP: 135/66 (12 Jun 2022 05:00) (135/66 - 149/86)  BP(mean): --  RR: 18 (12 Jun 2022 05:00) (18 - 18)  SpO2: 99% (12 Jun 2022 05:00) (94% - 99%)  CONSTITUTIONAL: NAD, well-developed, well-groomed  EYES: PERRLA; conjunctiva and sclera clear  ENMT: Moist oral mucosa, no pharyngeal injection or exudates; normal dentition  NECK: Supple, no palpable masses; no thyromegaly  RESPIRATORY: Normal respiratory effort; lungs are clear to auscultation bilaterally  CARDIOVASCULAR: Regular rate and rhythm, normal S1 and S2, no murmur/rub/gallop; No lower extremity edema; Peripheral pulses are 2+ bilaterally  ABDOMEN: Nontender to palpation, normoactive bowel sounds, no rebound/guarding; No hepatosplenomegaly  MUSCULOSKELETAL:  Normal gait; no clubbing or cyanosis of digits; no joint swelling or tenderness to palpation  PSYCH: A+O to person, place, and time; affect appropriate  NEUROLOGY: CN 2-12 are intact and symmetric; no gross sensory deficits   SKIN: No rashes; no palpable lesions    LABS:                        11.4   15.97 )-----------( 200      ( 12 Jun 2022 05:50 )             35.6     06-12    142  |  105  |  20  ----------------------------<  86  3.9   |  25  |  0.91    Ca    9.3      12 Jun 2022 05:50  Phos  3.6     06-12  Mg     1.90     06-12                  RADIOLOGY & ADDITIONAL TESTS:  Results Reviewed:   Imaging Personally Reviewed:  Electrocardiogram Personally Reviewed:    COORDINATION OF CARE:  Care Discussed with Consultants/Other Providers [Y/N]:  Prior or Outpatient Records Reviewed [Y/N]:   [facility]  Division of Hospital Medicine  Becky Napoles MD  Pager: [phone]      Patient is a 77y old  Female who presents with a chief complaint of confusion (11 Jun 2022 12:36)      SUBJECTIVE / OVERNIGHT EVENTS: Left a message for daughter yesterday for d/c planning no call back/. Patient eager to get home. No medical concerns   ADDITIONAL REVIEW OF SYSTEMS:    MEDICATIONS  (STANDING):  artificial tears (preservative free) Ophthalmic Solution 1 Drop(s) Both EYES three times a day  atorvastatin 20 milliGRAM(s) Oral at bedtime  heparin   Injectable 5000 Unit(s) SubCutaneous every 12 hours  hydrochlorothiazide 12.5 milliGRAM(s) Oral daily  mirtazapine 45 milliGRAM(s) Oral at bedtime  pantoprazole    Tablet 40 milliGRAM(s) Oral before breakfast  QUEtiapine 150 milliGRAM(s) Oral at bedtime  valsartan 160 milliGRAM(s) Oral daily  venlafaxine XR. 75 milliGRAM(s) Oral daily    MEDICATIONS  (PRN):  acetaminophen     Tablet .. 650 milliGRAM(s) Oral every 6 hours PRN Temp greater or equal to 38C (100.4F), Mild Pain (1 - 3)  ALPRAZolam 0.25 milliGRAM(s) Oral two times a day PRN anxious  melatonin 3 milliGRAM(s) Oral at bedtime PRN Insomnia      CAPILLARY BLOOD GLUCOSE        I&O's Summary      PHYSICAL EXAM:  Vital Signs Last 24 Hrs  T(C): 36.7 (12 Jun 2022 05:00), Max: 36.7 (12 Jun 2022 05:00)  T(F): 98 (12 Jun 2022 05:00), Max: 98 (12 Jun 2022 05:00)  HR: 88 (12 Jun 2022 05:00) (88 - 99)  BP: 135/66 (12 Jun 2022 05:00) (135/66 - 149/86)  BP(mean): --  RR: 18 (12 Jun 2022 05:00) (18 - 18)  SpO2: 99% (12 Jun 2022 05:00) (94% - 99%)  CONSTITUTIONAL: Elderly woman in NAD, well-developed, well-groomed  RESPIRATORY: Normal respiratory effort; lungs are clear to auscultation bilaterally  CARDIOVASCULAR: Regular rate and rhythm, normal S1 and S2, no murmur/rub/gallop; No lower extremity edema; Peripheral pulses are 2+ bilaterally  ABDOMEN: Nontender to palpation, normoactive bowel sounds, no rebound/guarding; No hepatosplenomegaly  PSYCH: A+O to person, place, and time; - [facility] and June 2022  affect appropriate  NEUROLOGY:  no gross sensory deficits   SKIN: No rashes; no palpable lesions    LABS:                        11.4   15.97 )-----------( 200      ( 12 Jun 2022 05:50 )             35.6     06-12    142  |  105  |  20  ----------------------------<  86  3.9   |  25  |  0.91    Ca    9.3      12 Jun 2022 05:50  Phos  3.6     06-12  Mg     1.90     06-12                  RADIOLOGY & ADDITIONAL TESTS:  Results Reviewed:   Imaging Personally Reviewed:  Electrocardiogram Personally Reviewed:    COORDINATION OF CARE:  Care Discussed with Consultants/Other Providers [Y/N]:  Prior or Outpatient Records Reviewed [Y/N]:   [facility]  Division of Hospital Medicine  Becky Napoles MD  Pager: [phone]      Patient is a 77y old  Female who presents with a chief complaint of confusion (11 Jun 2022 12:36)      SUBJECTIVE / OVERNIGHT EVENTS: Left a message for daughter yesterday for d/c planning no call back/. Patient eager to get home. No medical concerns. discussed with Deborah states her mom improved. Okay with discharge home   ADDITIONAL REVIEW OF SYSTEMS:    MEDICATIONS  (STANDING):  artificial tears (preservative free) Ophthalmic Solution 1 Drop(s) Both EYES three times a day  atorvastatin 20 milliGRAM(s) Oral at bedtime  heparin   Injectable 5000 Unit(s) SubCutaneous every 12 hours  hydrochlorothiazide 12.5 milliGRAM(s) Oral daily  mirtazapine 45 milliGRAM(s) Oral at bedtime  pantoprazole    Tablet 40 milliGRAM(s) Oral before breakfast  QUEtiapine 150 milliGRAM(s) Oral at bedtime  valsartan 160 milliGRAM(s) Oral daily  venlafaxine XR. 75 milliGRAM(s) Oral daily    MEDICATIONS  (PRN):  acetaminophen     Tablet .. 650 milliGRAM(s) Oral every 6 hours PRN Temp greater or equal to 38C (100.4F), Mild Pain (1 - 3)  ALPRAZolam 0.25 milliGRAM(s) Oral two times a day PRN anxious  melatonin 3 milliGRAM(s) Oral at bedtime PRN Insomnia      CAPILLARY BLOOD GLUCOSE        I&O's Summary      PHYSICAL EXAM:  Vital Signs Last 24 Hrs  T(C): 36.7 (12 Jun 2022 05:00), Max: 36.7 (12 Jun 2022 05:00)  T(F): 98 (12 Jun 2022 05:00), Max: 98 (12 Jun 2022 05:00)  HR: 88 (12 Jun 2022 05:00) (88 - 99)  BP: 135/66 (12 Jun 2022 05:00) (135/66 - 149/86)  BP(mean): --  RR: 18 (12 Jun 2022 05:00) (18 - 18)  SpO2: 99% (12 Jun 2022 05:00) (94% - 99%)  CONSTITUTIONAL: Elderly woman in NAD, well-developed, well-groomed  RESPIRATORY: Normal respiratory effort; lungs are clear to auscultation bilaterally  CARDIOVASCULAR: Regular rate and rhythm, normal S1 and S2, no murmur/rub/gallop; No lower extremity edema; Peripheral pulses are 2+ bilaterally  ABDOMEN: Nontender to palpation, normoactive bowel sounds, no rebound/guarding; No hepatosplenomegaly  PSYCH: A+O to person, place, and time; - [facility] and June 2022  affect appropriate  NEUROLOGY:  no gross sensory deficits   SKIN: No rashes; no palpable lesions    LABS:                        11.4   15.97 )-----------( 200      ( 12 Jun 2022 05:50 )             35.6     06-12    142  |  105  |  20  ----------------------------<  86  3.9   |  25  |  0.91    Ca    9.3      12 Jun 2022 05:50  Phos  3.6     06-12  Mg     1.90     06-12                  RADIOLOGY & ADDITIONAL TESTS:  Results Reviewed:   Imaging Personally Reviewed:  Electrocardiogram Personally Reviewed:    COORDINATION OF CARE:  Care Discussed with Consultants/Other Providers [Y/N]:  Prior or Outpatient Records Reviewed [Y/N]:

## 2022-06-12 NOTE — DISCHARGE NOTE PROVIDER - NSDCMRMEDTOKEN_GEN_ALL_CORE_FT
ALPRAZolam 0.25 mg oral tablet: 1 tab(s) orally 2 times a day, As needed, anxious MDD:0.5 mg  atorvastatin 20 mg oral tablet: 1 tab(s) orally once a day  mirtazapine 45 mg oral tablet: 1 tab(s) orally once a day (at bedtime)  ocular lubricant ophthalmic solution: 1 drop(s) to each affected eye 3 times a day  omeprazole 40 mg oral delayed release capsule: 1 cap(s) orally once a day  QUEtiapine 50 mg oral tablet: 3 tab(s) orally once a day (at bedtime)  valsartan-hydrochlorothiazide 320 mg-12.5 mg oral tablet: 1 tab(s) orally once a day  venlafaxine 75 mg oral capsule, extended release: 1 cap(s) orally once a day

## 2022-06-12 NOTE — PROGRESS NOTE ADULT - PROBLEM SELECTOR PLAN 2
Assessment:  - patient presents for worsening confusion and forgetfulness after chemotherapy.  Intermittent period of confusion, improving: possible underlying dementia as reversible causes of dementia unremarkable  Bcx NTD, ucx   - Apprec Psych rec:  TSH: wnl  B12: elevated , folate: wnl; f/u  RPR, antithyroglobulin Ab, TPO Ab, vitamin B1, B6,  - Procal neg   - CXR neg, CT head unremarkable   - F/u  Remeron 45mg qhs, Seroquel 100mg qhs -> increased seroquel to 125mg qhs and effexor 75mg   - Apprec hem/onc rec: no chemo inpatient,    - Neurology consulted, appreciate recs Will f/u  MRI brain - ordered on 6/7: Bilateral choroid plexus cyst are seen. Assessment :Likely underlying dementia with superimposed depression/pseudodementia   - patient presents for worsening confusion and forgetfulness after chemotherapy.  Intermittent period of confusion, improving: possible underlying dementia as reversible causes of dementia unremarkable  Bcx NTD, ucx   - Apprec Psych rec:  TSH: wnl  B12: elevated , folate: wnl; f/u  RPR, antithyroglobulin Ab, TPO Ab, vitamin B1, B6,  - Procal neg   - CXR neg, CT head unremarkable   - F/u  Remeron 45mg qhs, Seroquel 100mg qhs -> increased seroquel to 125mg qhs and effexor 75mg   - Apprec hem/onc rec: no chemo inpatient,    - Neurology consulted, appreciate recs Will f/u  MRI brain - ordered on 6/7: Bilateral choroid plexus cyst are seen. Per neuro, NTD

## 2022-06-12 NOTE — PROGRESS NOTE ADULT - PROBLEM SELECTOR PLAN 8
- heparin subq for dvt ppx  Dispo; PT: home; no skilled PT needs. cleared for home. Unable to reach daughter on 6/11 - heparin subq for dvt ppx  Dispo; PT: home; no skilled PT needs. cleared for home. Unable to reach daughter on 6/11. discussed on 6/12. 35 minutes spent discharge planning.

## 2022-06-12 NOTE — DISCHARGE NOTE PROVIDER - HOSPITAL COURSE
This is a 76 y/o F with pmhx of recently diagnosed breast Ca on chemo first session 5/27/2022, anxiety, COPD, HTN, HLD, presented to the ED for new onset worsening confusion and vague symptoms.    Pt with hypokalemia which was repleted. CXR neg ,CT head obtained and is unremarkable , blood and urine cultures and procalcitonin negative. Psychiatry team was consulted for depression and pt was started on Seroquel and  Effexor. Seroquel dose was increased. MRI brain obtained shows Bilateral choroid plexus cysts. Patient with Senile dementia with depression and is recommended to follow up with [facility] outpatient geriatric psychiatry team. Pt is stable for discharge home on 6/12/2022 as discussed with Dr. Napoles.

## 2022-06-12 NOTE — PROGRESS NOTE ADULT - PROBLEM SELECTOR PROBLEM 3
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

## 2022-06-12 NOTE — PROGRESS NOTE ADULT - PROBLEM SELECTOR PROBLEM 1
Right ear pain

## 2022-06-12 NOTE — DISCHARGE NOTE PROVIDER - CARE PROVIDER_API CALL
Marilyn Jain)  Hematology; HospicePalliative Medicine; Internal Medicine; Medical Oncology  [address] [place] [address]  Phone: [phone]  Fax: [phone]  Follow Up Time:

## 2022-06-12 NOTE — PROGRESS NOTE ADULT - PROBLEM SELECTOR PLAN 5
Dropped off urine for microalbumin - heme/onc consulted: no plan for chemo as inpatient; pt to f/u with her med oncologist Marilyn Putnam at [facility] after d/c

## 2022-06-12 NOTE — PROGRESS NOTE ADULT - PROBLEM SELECTOR PROBLEM 4
Acute hypokalemia

## 2022-06-15 LAB — PYRIDOXAL PHOS SERPL-MCNC: 1.6 UG/L — LOW (ref 3.4–65.2)

## 2022-06-16 LAB — METHYLMALONATE SERPL-SCNC: 141 NMOL/L — SIGNIFICANT CHANGE UP (ref 0–378)

## 2022-06-17 ENCOUNTER — RX RENEWAL (OUTPATIENT)
Age: 77
End: 2022-06-17

## 2022-06-20 ENCOUNTER — APPOINTMENT (OUTPATIENT)
Dept: INTERNAL MEDICINE | Facility: CLINIC | Age: 77
End: 2022-06-20
Payer: MEDICARE

## 2022-06-20 VITALS
OXYGEN SATURATION: 96 % | BODY MASS INDEX: 30.02 KG/M2 | HEART RATE: 97 BPM | HEIGHT: 61.02 IN | TEMPERATURE: 98.8 F | WEIGHT: 159 LBS

## 2022-06-20 VITALS — DIASTOLIC BLOOD PRESSURE: 76 MMHG | SYSTOLIC BLOOD PRESSURE: 128 MMHG

## 2022-06-20 PROCEDURE — 36415 COLL VENOUS BLD VENIPUNCTURE: CPT

## 2022-06-20 PROCEDURE — 99495 TRANSJ CARE MGMT MOD F2F 14D: CPT | Mod: 25

## 2022-06-20 RX ORDER — VENLAFAXINE HYDROCHLORIDE 75 MG/1
75 CAPSULE, EXTENDED RELEASE ORAL
Qty: 90 | Refills: 1 | Status: DISCONTINUED | COMMUNITY
Start: 2022-06-13 | End: 2022-06-20

## 2022-06-20 NOTE — HISTORY OF PRESENT ILLNESS
[Post-hospitalization from ___ Hospital] : Post-hospitalization from [unfilled] Hospital [Admitted on: ___] : The patient was admitted on [unfilled] [Discharge Summary] : discharge summary [Discharged on ___] : discharged on [unfilled] [FreeTextEntry2] : seen with sig other\par hosp course reviewed\par medication changes reviewed\par pt sched for chemo sessions as noted

## 2022-06-20 NOTE — ASSESSMENT
[FreeTextEntry1] : geriatric psyc eval advised as per d/s summary\par repeat labs to f/u abnlm electrolytes from admission

## 2022-06-21 LAB
ANION GAP SERPL CALC-SCNC: 12 MMOL/L
BUN SERPL-MCNC: 19 MG/DL
CALCIUM SERPL-MCNC: 10.1 MG/DL
CHLORIDE SERPL-SCNC: 104 MMOL/L
CO2 SERPL-SCNC: 25 MMOL/L
CREAT SERPL-MCNC: 0.64 MG/DL
EGFR: 91 ML/MIN/1.73M2
GLUCOSE SERPL-MCNC: 102 MG/DL
POTASSIUM SERPL-SCNC: 3.9 MMOL/L
SODIUM SERPL-SCNC: 141 MMOL/L

## 2022-06-22 ENCOUNTER — APPOINTMENT (OUTPATIENT)
Dept: SURGICAL ONCOLOGY | Facility: CLINIC | Age: 77
End: 2022-06-22
Payer: MEDICARE

## 2022-06-22 VITALS
OXYGEN SATURATION: 96 % | HEIGHT: 61 IN | SYSTOLIC BLOOD PRESSURE: 120 MMHG | TEMPERATURE: 97.8 F | DIASTOLIC BLOOD PRESSURE: 79 MMHG | HEART RATE: 100 BPM | RESPIRATION RATE: 17 BRPM

## 2022-06-22 LAB — VIT B1 SERPL-MCNC: 107 NMOL/L — SIGNIFICANT CHANGE UP (ref 66.5–200)

## 2022-06-22 PROCEDURE — 99213 OFFICE O/P EST LOW 20 MIN: CPT

## 2022-06-22 NOTE — CONSULT LETTER
[Consult Letter:] : I had the pleasure of evaluating your patient, [unfilled]. [Dear  ___] : Dear  [unfilled], [Courtesy Letter:] : I had the pleasure of seeing your patient, [unfilled], in my office today. [Please see my note below.] : Please see my note below. [Consult Closing:] : Thank you very much for allowing me to participate in the care of this patient.  If you have any questions, please do not hesitate to contact me. [Sincerely,] : Sincerely, [FreeTextEntry3] : Lexie Yee MD\par Breast Surgeon\par Division of Surgical Oncology\par Department of Surgery\par [address] [place] [address]\[place] [address] \par Tel: [phone]\par Fax: [phone]\par Email: [email or web address]

## 2022-06-22 NOTE — ASSESSMENT
[FreeTextEntry1] : The patient is a 77 year old female referred for consultation by Dr. 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 Left arm also no evidence of swelling. Some limitation of movement at the shoulder--reviewed arm stretching exercises today.\par \par Plan:\par  - Oncotype pending\par  - refer to medical oncology\par  - refer to radiation oncology\par  - Next imaging 1/2023 B/L SM\par  - RTO 3 months

## 2022-06-22 NOTE — ASSESSMENT
[FreeTextEntry1] : The patient is a 77 year old female referred for consultation by Dr. 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 Left arm also no evidence of swelling. Some limitation of movement at the shoulder--reviewed arm stretching exercises today.\par \par Plan:\par  - Oncotype pending\par  - refer to medical oncology\par  - refer to radiation oncology\par  - Next imaging 1/2023 B/L SM\par  - RTO 3 months

## 2022-06-22 NOTE — REVIEW OF SYSTEMS
[As Noted in HPI] : as noted in HPI [Anxiety] : anxiety [Negative] : Heme/Lymph [de-identified] : forgetful

## 2022-06-22 NOTE — REVIEW OF SYSTEMS
[As Noted in HPI] : as noted in HPI [Anxiety] : anxiety [Negative] : Heme/Lymph [de-identified] : forgetful

## 2022-06-22 NOTE — HISTORY OF PRESENT ILLNESS
[FreeTextEntry1] : The patient is a 77 year old female referred for consultation by Dr. Drake Carr for Left breast IDC now s/p Left breast lumpectomy, L SLNB on 3/7/22.\par \par She is not accompanied by anyone today.\par \par Prior history:\par She reports regular breast screening but did have a pause due to the pandemic. She also admits that she is being worked up for a memory loss disorder and is unsure of her medical history.\par \par Her most recent imaging:\par \par 1/25/2022 B/L SM (NW) TC 3%, fatty\par  - B/L benign-appearing masses and calcifications\par  - L lower inner middle- to posterior third possible irregular mass -> DM/US\par \par 1/25/2022 B/L US\par  - R neg\par  - L 9:00 N6 6 mm irregular hypoechoic nodule -> DM/US\par  - BR0\par \par 2/3/2022 L DM\par  - L 9:00 middle depth sub-cm spiculated mass with surrounding circumscribed sub-cm masses\par \par 2/3/2022 L US\par  - L 9:00 N6 7 x 4 x 6 mm irregular hypoechoic mass -> Bx\par  - L 8:00 N5 6 mm cyst cluster\par  - no suspicious LNs\par  - BR5\par \par 2/09/2022 L USG-CNB\par  - L 9:00 N6 (wing): IDC, moderately differentiated, focal ADH; ER 70% mod-strong, NV 0, HER2 1+\par \par She is unable to tell me her medical history. From a chart review, she has COPD, depression, HTN, HLD, LBBB, OA, memory loss disorder. Her surgical history includes B/L lumpectomy (pt unable to recall if this is accurate), foot surgery, D&C, cataract surgery\par \par She reports that her parents both had some kind of cancer, but she is unsure what type. Her brother had a tumor in his cheek removed.\par \par 2/21/2022 MRI\par  - R negative\par  - L inner breast biopsy marker 5 mm enhancing mass\par  - Neg LNs\par \par 3/07/2022 L lumpectomy, L SLNB\par  - IDC, G2, 7 mm\par  - DCIS, intermediate grade, at least 1 mm (3/28 slides)\par  - Margins negative\par  - SLNB 0/7\par  - ER 70%, NV 0%, HER2 negative\par  - [id number], AJCC Stage IA\par \par Interval history:\par The patient reports still having some arm pain. Denies numbness, denies swelling. Denies pain in the breast. Denies fevers/chills/redness.\par

## 2022-06-22 NOTE — HISTORY OF PRESENT ILLNESS
[FreeTextEntry1] : The patient is a 77 year old female referred for consultation by Dr. Drake Carr for Left breast IDC now s/p Left breast lumpectomy, L SLNB on 3/7/22.\par \par She is not accompanied by anyone today.\par \par Prior history:\par She reports regular breast screening but did have a pause due to the pandemic. She also admits that she is being worked up for a memory loss disorder and is unsure of her medical history.\par \par Her most recent imaging:\par \par 1/25/2022 B/L SM (NW) TC 3%, fatty\par  - B/L benign-appearing masses and calcifications\par  - L lower inner middle- to posterior third possible irregular mass -> DM/US\par \par 1/25/2022 B/L US\par  - R neg\par  - L 9:00 N6 6 mm irregular hypoechoic nodule -> DM/US\par  - BR0\par \par 2/3/2022 L DM\par  - L 9:00 middle depth sub-cm spiculated mass with surrounding circumscribed sub-cm masses\par \par 2/3/2022 L US\par  - L 9:00 N6 7 x 4 x 6 mm irregular hypoechoic mass -> Bx\par  - L 8:00 N5 6 mm cyst cluster\par  - no suspicious LNs\par  - BR5\par \par 2/09/2022 L USG-CNB\par  - L 9:00 N6 (wing): IDC, moderately differentiated, focal ADH; ER 70% mod-strong, VT 0, HER2 1+\par \par She is unable to tell me her medical history. From a chart review, she has COPD, depression, HTN, HLD, LBBB, OA, memory loss disorder. Her surgical history includes B/L lumpectomy (pt unable to recall if this is accurate), foot surgery, D&C, cataract surgery\par \par She reports that her parents both had some kind of cancer, but she is unsure what type. 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 Denies numbness, denies swelling. Denies pain in the breast. Denies fevers/chills/redness.\par

## 2022-06-22 NOTE — PHYSICAL EXAM
[Normocephalic] : normocephalic [Atraumatic] : atraumatic [EOMI] : extra ocular movement intact [PERRL] : pupils equal, round and reactive to light [Sclera nonicteric] : sclera nonicteric [Supple] : supple [No Supraclavicular Adenopathy] : no supraclavicular adenopathy [No Cervical Adenopathy] : no cervical adenopathy [No Thyromegaly] : no thyromegaly [Examined in the supine and seated position] : examined in the supine and seated position [Asymmetrical] : asymmetrical [Grade 2] : Ptosis Grade 2 [Breast Mass Right Breast ___cm] : no masses [Breast Mass Left Breast ___cm] : no masses [Breast Nipple Inversion] : nipples not inverted [Breast Nipple Retraction] : nipples not retracted [Breast Nipple Flattening] : nipples not flattened [Breast Nipple Fissures] : nipples not fissured [No Axillary Lymphadenopathy] : no left axillary lymphadenopathy [No Edema] : no edema [No Swelling] : no swelling [No Rashes] : no rashes [No Ulceration] : no ulceration [de-identified] : non-labored respirations  [de-identified] : left inner breast incision well-healed [de-identified] : well-healed incision [de-identified] : some limited ROM of left shoulder

## 2022-06-22 NOTE — CONSULT LETTER
[Consult Letter:] : I had the pleasure of evaluating your patient, [unfilled]. [Dear  ___] : Dear  [unfilled], [Courtesy Letter:] : I had the pleasure of seeing your patient, [unfilled], in my office today. [Please see my note below.] : Please see my note below. [Consult Closing:] : Thank you very much for allowing me to participate in the care of this patient.  If you have any questions, please do not hesitate to contact me. [Sincerely,] : Sincerely, [FreeTextEntry3] : Lexie Yee MD\par Breast Surgeon\par Division of Surgical Oncology\par Department of Surgery\par [address] [place] [address]\[place] [address] \par Tel: [phone]\par Fax: [phone]\par Email: [email or web address]

## 2022-06-24 ENCOUNTER — APPOINTMENT (OUTPATIENT)
Dept: INFUSION THERAPY | Facility: HOSPITAL | Age: 77
End: 2022-06-24

## 2022-06-24 ENCOUNTER — APPOINTMENT (OUTPATIENT)
Dept: HEMATOLOGY ONCOLOGY | Facility: CLINIC | Age: 77
End: 2022-06-24
Payer: MEDICARE

## 2022-06-24 VITALS
DIASTOLIC BLOOD PRESSURE: 88 MMHG | WEIGHT: 155.4 LBS | BODY MASS INDEX: 29.34 KG/M2 | RESPIRATION RATE: 16 BRPM | HEART RATE: 101 BPM | HEIGHT: 60.98 IN | TEMPERATURE: 97.1 F | SYSTOLIC BLOOD PRESSURE: 142 MMHG | OXYGEN SATURATION: 96 %

## 2022-06-24 PROCEDURE — 99214 OFFICE O/P EST MOD 30 MIN: CPT

## 2022-06-24 NOTE — PHYSICAL EXAM
[Restricted in physically strenuous activity but ambulatory and able to carry out work of a light or sedentary nature] : Status 1- Restricted in physically strenuous activity but ambulatory and able to carry out work of a light or sedentary nature, e.g., light house work, office work [Normal] : affect appropriate [de-identified] : healing left breast lumpectomy and axillary scar

## 2022-06-24 NOTE — HISTORY OF PRESENT ILLNESS
[de-identified] : Ms. Olga Womack is a 77 year old female here for an evaluation of breast cancer. Her oncologic history is as follows:\par \par She underwent routine breast imaging on 01/25/2022 (BI-RADS 0)  which showed possible irregular shaped mass with associated distortion lower inner left breast middle to posterior third which likely correlates with an irregular shaped hypoechoic nodule with associated acoustic shadowing left breast 9:00 6 cm from nipple on screening breast ultrasound. for which additional imaging was rec. Additional left breast imaging on 02/03/2022 (BI-RADS 5) showed a 7 mm irregular mass in the left breast at the 9:00 position for which ultrasound-guided core biopsy is advised.\par \par  She underwent  left breast 9 o'clock 6cmfn ultrasound guided biopsy core on 2/9/2022 which showed invasive moderately differentiated ductal carcinoma, [place] score 6/9, measuring 0.5 cm ER +, 70%, PgR Negative,HER-2 Negative. Microcalcifications and focal ADH and  columnar cell change noted.\par \par She underwent a breast MRI on 02/21/2022 (BI-RADS 6) which showed  a 5 mm enhancing mass in the left inner breast, recently diagnosed left breast malignancy.\par No other suspicious enhancing masses. \par \par She underwent left breast lumpectomy: on  3/7/2022 \par 3/07/2022 L lumpectomy, L SLNB\par  - IDC, G2, 7 mm\par  - DCIS, intermediate grade, at least 1 mm (3/28 slides)\par  - Margins negative\par  - SLNB 0/7\par  - ER 70%, [place] 0%, HER2 negative\par  - [id number], AJCC Stage IA\par \par Initial consult 4/4/22- AI prescribed\par ODX reported 4/13/22: RS 36\par D/w pt over the phone 4/14/22- rec to come for visit\par \par She is here today 4/25/22 to discuss ODX. \par She doesn’t know why she is here today\par Pt reports she is forgetful and has no recollection of ODX discussion or visit ith me 3 weeks ago\par She is having major anxiety and reports " acting crazy" at home. She lives with friends Dianne and Deidre\par She is here with friend- Shania. Shania reports pt is very forgetful lately. Was here last time with friend Dianne but she doesn’t remember \par She says that she thought she was going to see someone for "acting crazy" \par She is tearful and reports " she cant think straight". She dosent remember the name of her psychiatrist or when she was seen last. \par Denies SI or HI \par After a lot of redirecting, she wanted to discuss cancer treatment option. We discussed CMF chemo but she is not interested. pt needs to be evaluated by psychiatrist first to control anxiety issues and then have better discussion re risks/benefits of chemo. Given her age, comorbidities and uncontrolled anxiety- may not be able to tolerate anything more than AI but will need a discussion about her options when she is able to comprehend risks/benefits. \par \par addendum 4/25: Pt discharged from ER w/o psyche intervention\par addendum 4/26: referred to see Dr Marie urgently\par addendum 4/27: Dr Marie rec to see her primary psychiatrist, Dr Mahajan\par addendum 4/28- spoke to pt. She says she is alone and doesn’t know how to place call to make appt. She says Her mind isnt working.\par  My office conference her in with Dr Mahajan office. She declined an earlier appt and wanted to keep appt for next month. \par \par We will schedule office visit in 1-2 weeks to f/u \par Continue AI in the meantime\par \par 5/13/22: She is here today with friend Ranjit. They live together and he is well aware of her medical conditions\par She is in better mood today. Says she is here today because she has an appt to discuss cancer treatment. Not tearful or anxious TODAY, able to have a conversation\par Ranjit had RT for prostate cancer and is very supportive \par She is f/b psychiatrist for anxiety/depression. f/b Dr Mahajan \par We discussed CMF chemo and RT due to high risk ODX\par \par I discussed the expected and unexpected side effects of chemotherapy, including but not limited to hair loss, fatigue, change in taste, mouth sores, nausea, vomiting, diarrhea, infusion reaction, allergic reaction, significant myelosuppression, need for blood transfusion, infection, bleeding, cardiac toxicity, neuropathy, chemical cystitis, kidney injury, nerve pain, muscle pain and detrimental effect on liver, lung and heart function. I also discussed a small but potential risk of development of acute leukemia with the use of cyclophosphamide therapy.\par \par The patient understood all the potential side effects and signed an informed consent after discussing the risks, benefits and alternatives.\par Chemo consent obtained \par Patient is willing to do chemo. Concerned about transportation.  called\par  Holding AI while on chemo\par Case was presented in the TB last week. Consensus was to give CMF chemo f/b RT\par Will refer for RT after chemo\par \par Here to start CMF + onpro #1 today 5/27/22 Counts good \par Chemo s/e discussed,\par She reports being very forgetful . I reminded her to HOLD anastrozole during Chemo  \par Medication for symptom management reviewed and questions answered\par EKG done 5/13/22 she is noted to have left bundle branch block and will f/u with cardio oncology , \par Patient is a anxious about stating chemo today\par Emotional support given, Written instructions given  [de-identified] : In summary, Ms. MARVIN CHISHOLM is a 77 year old postmenopausal female with stage IA (T1b, N0, Mx) ER positive, AK negative, HER-2/susan negative invasive ductal carcinoma of the left breast. She is status post lumpectomy and ALNbx 3/2022. ODX: RS 36 She has h/o severe anxiety and depression.\par \par 6/24/22 : she was hospitalized after C1 of chemo. She had neutropenia. \par She is been home x 2 weeks and still feeling tired\par She has appt with psyche for her mental issues.\par We discussed risks of continuing chemotherapy as well as benefits.  We discussed potential dose reduction.  Patient does not wish to continue chemotherapy given significant toxicity with the first cycle.  She will be sent for radiation oncology consultation.  She will restart anastrozole in the meantime.  Patient and her friend are both in agreement.\par

## 2022-06-24 NOTE — ASSESSMENT
[FreeTextEntry1] : In summary, Ms. MARVIN CHISHOLM is a 77 year old postmenopausal female with stage IA (T1b, N0, Mx) ER positive, [place] negative, HER-2/susan negative invasive ductal carcinoma of the left breast. She is status post lumpectomy and ALNbx 3/2022. She has h/o severe anxiety and depression. ODX reported 4/13/22: RS 36\par \par \par Patient started chemotherapy after long discussion.  She received CMF 5/2022.  She will admitted with neutropenic fever and other complications secondary to chemotherapy.\par 6/24/22 : she was hospitalized after C1 of chemo. She had neutropenia. \par She is been home x 2 weeks and still feeling tired\par She has appt with psyche for her mental issues.  Recommend to continue psych follow-up closely\par We discussed risks of continuing chemotherapy as well as benefits.  We discussed potential dose reduction.  Patient does not wish to continue chemotherapy given significant toxicity with the first cycle.  She will be sent for radiation oncology consultation.  She will restart anastrozole in the meantime.  Patient and her friend are both in agreement.\par \par \par \par \par

## 2022-07-01 ENCOUNTER — APPOINTMENT (OUTPATIENT)
Dept: RADIATION ONCOLOGY | Facility: CLINIC | Age: 77
End: 2022-07-01

## 2022-07-01 ENCOUNTER — OUTPATIENT (OUTPATIENT)
Dept: OUTPATIENT SERVICES | Facility: HOSPITAL | Age: 77
LOS: 1 days | Discharge: HOME | End: 2022-07-01

## 2022-07-01 DIAGNOSIS — S92.912A UNSPECIFIED FRACTURE OF LEFT TOE(S), INITIAL ENCOUNTER FOR CLOSED FRACTURE: Chronic | ICD-10-CM

## 2022-07-01 DIAGNOSIS — Z98.51 TUBAL LIGATION STATUS: Chronic | ICD-10-CM

## 2022-07-01 DIAGNOSIS — Z98.890 OTHER SPECIFIED POSTPROCEDURAL STATES: Chronic | ICD-10-CM

## 2022-07-01 DIAGNOSIS — Z92.29 PERSONAL HISTORY OF OTHER DRUG THERAPY: ICD-10-CM

## 2022-07-01 DIAGNOSIS — Z98.49 CATARACT EXTRACTION STATUS, UNSPECIFIED EYE: Chronic | ICD-10-CM

## 2022-07-01 PROCEDURE — 77263 THER RADIOLOGY TX PLNG CPLX: CPT

## 2022-07-01 PROCEDURE — 99204 OFFICE O/P NEW MOD 45 MIN: CPT | Mod: 25

## 2022-07-01 RX ORDER — LORATADINE 5 MG/5 ML
10 SOLUTION, ORAL ORAL DAILY
Qty: 30 | Refills: 1 | Status: DISCONTINUED | COMMUNITY
Start: 2022-05-26 | End: 2022-07-01

## 2022-07-01 RX ORDER — QUETIAPINE FUMARATE 100 MG/1
100 TABLET ORAL
Qty: 28 | Refills: 0 | Status: ACTIVE | COMMUNITY
Start: 2022-05-18

## 2022-07-01 RX ORDER — ONDANSETRON 4 MG/1
4 TABLET ORAL EVERY 8 HOURS
Qty: 60 | Refills: 0 | Status: DISCONTINUED | COMMUNITY
Start: 2022-06-02 | End: 2022-07-01

## 2022-07-01 RX ORDER — DEXAMETHASONE 4 MG/1
4 TABLET ORAL
Qty: 30 | Refills: 1 | Status: DISCONTINUED | COMMUNITY
Start: 2022-05-26 | End: 2022-07-01

## 2022-07-01 RX ORDER — SULFAMETHOXAZOLE AND TRIMETHOPRIM 800; 160 MG/1; MG/1
800-160 TABLET ORAL
Qty: 10 | Refills: 0 | Status: DISCONTINUED | COMMUNITY
Start: 2021-12-30 | End: 2022-07-01

## 2022-07-01 RX ORDER — ANASTROZOLE TABLETS 1 MG/1
1 TABLET ORAL DAILY
Qty: 90 | Refills: 1 | Status: DISCONTINUED | COMMUNITY
Start: 2022-04-04 | End: 2022-07-01

## 2022-07-01 RX ORDER — ALPRAZOLAM 0.5 MG/1
0.5 TABLET ORAL
Qty: 3 | Refills: 0 | Status: ACTIVE | COMMUNITY
Start: 2022-03-22

## 2022-07-01 NOTE — HISTORY OF PRESENT ILLNESS
[FreeTextEntry1] : Ms Womack is a 77 year old  female with PMH of severe anxiety and depression, who is being seen in telephonic consultation for consideration of adjuvant radiation therapy to the left breast.\par \par Diagnosis: Stage IA pT1b(7mm) N0(0/7) Invasive ductal carcinoma of the LEFT breast, G2, ER positive, PA negative, HER-2 negative. S/p lumpectomy and (0/7) negative SLNB. \par Oncotype DX Score -36 \par \par Onc History:\par 01/25/2022 - Screening mammo : showed possible irregular shaped mass with associated distortion lower inner left breast middle to posterior third which likely correlates with an irregular shaped hypoechoic nodule with associated acoustic shadowing left breast 9:00 6 cm from nipple on screening breast ultrasound. for which additional imaging was rec. \par \par 02/03/2022 Diagnostic Mammo showed a 7 mm irregular mass in the left breast at the 9:00 position. BI-RADS 5. ultrasound-guided core biopsy is advised.\par \par 2/9/2022-  Left breast 9 o'clock 6 cmfn ultrasound guided biopsy core  showed invasive moderately differentiated ductal carcinoma, [place] score 6/9, measuring 0.5 cm, ER + (70%)  PA Negative (0%)  ,HER-2 Negative. Microcalcifications and focal ADH and columnar cell change noted.\par \par  02/21/2022- Breast MRI showed a 5 mm enhancing mass in the left inner breast, recently diagnosed left breast malignancy.\par No other suspicious enhancing masses. BI-RADS 6\par \par 3/7/2022 - LEFT breast lumpectomy and L SLNB.  (Dr. KIANA Yee- surgeon)\par  Patholgy showed\par  - IDC, G2, 7 mm, - DCIS, intermediate grade, at least 1 mm (3/28 slides). Margins negative,  SLNB 0/7 involved. \par  - ER 70%, PA 0%, HER2 negative\par  - [id number], AJCC Stage IA\par Oncotype DX Score -36 \par \par 4/4/22-  Evaluated by Dr. Jain (oncology). Ms. Womack was started on Anastrozole at this time as Oncotype was pending and she wasn't interested in chemotherapy or radiation at that time. \par \par 4/25/22 - Evaluated in the ED for uncontrolled anxiety\par \par 5/13/22 - seen by Dr. Jain (oncology) again in follow up and was doing better psychologically and consented to chemotherapy and to hold Anastrozole while on chemotherapy (CMF with Onpro) followed by Radiation.  \par \par 5/27/22 - Cycle 1 CMF + onpro, Hospitalized after this cycle from 6/3/22 - 6/12/22 for neutropenia/confusion/dizziness. She followed up with Dr. Jain (oncology) on 6/24/22 and Ms. Womack declined further chemotherapy. \par Re-Started on Anastrazole and referral to radiation made.  \par \par Of note:  EKG done 5/13/22 she is noted to have left bundle branch block, to follow up with cardiology. \par \par \par 71/22 -  telephonic consultation to discuss radiation therapy.  Ms. Womack was at home for the visit today and states that she still has feelings of depression and anxiety.  She lives with friends and feels supported by them. She drives herself but can get a ride for office visits.

## 2022-07-01 NOTE — VITALS
[Maximal Pain Intensity: 0/10] : 0/10 [Least Pain Intensity: 0/10] : 0/10 [ECOG Performance Status: 2 - Ambulatory and capable of all self care but unable to carry out any work activities] : Performance Status: 2 - Ambulatory and capable of all self care but unable to carry out any work activities. Up and about more than 50% of waking hours [Date: ____________] : Patient's last distress assessment performed on [unfilled]. [5 - Distress Level] : Distress Level: 5 [Referred Patient  to social work for follow-up] : Patient was referred to social work for follow-up

## 2022-07-01 NOTE — REVIEW OF SYSTEMS
[Anxiety] : anxiety [Depression] : depression [Negative] : Endocrine [de-identified] : c/o loss of hair after chemotherapy  [de-identified] : has some confusion and forgetfulness at times  [de-identified] : follows with psychiatrist

## 2022-07-01 NOTE — REASON FOR VISIT
[Consideration of Curative Therapy] : consideration of curative therapy for [Breast Cancer] : breast cancer [Home] : at home, [unfilled] , at the time of the visit. [Medical Office: ([facility])___] : at the medical office located in  [Verbal consent obtained from patient] : the patient, [unfilled]

## 2022-07-08 ENCOUNTER — APPOINTMENT (OUTPATIENT)
Dept: HEMATOLOGY ONCOLOGY | Facility: CLINIC | Age: 77
End: 2022-07-08

## 2022-07-08 ENCOUNTER — APPOINTMENT (OUTPATIENT)
Dept: INFUSION THERAPY | Facility: HOSPITAL | Age: 77
End: 2022-07-08

## 2022-07-08 ENCOUNTER — APPOINTMENT (OUTPATIENT)
Dept: RADIATION ONCOLOGY | Facility: CLINIC | Age: 77
End: 2022-07-08

## 2022-07-13 ENCOUNTER — APPOINTMENT (OUTPATIENT)
Dept: HEMATOLOGY ONCOLOGY | Facility: CLINIC | Age: 77
End: 2022-07-13

## 2022-07-15 ENCOUNTER — NON-APPOINTMENT (OUTPATIENT)
Age: 77
End: 2022-07-15

## 2022-07-15 ENCOUNTER — APPOINTMENT (OUTPATIENT)
Dept: RADIATION ONCOLOGY | Facility: CLINIC | Age: 77
End: 2022-07-15

## 2022-07-15 VITALS
TEMPERATURE: 96.98 F | BODY MASS INDEX: 30.67 KG/M2 | WEIGHT: 156.19 LBS | OXYGEN SATURATION: 93 % | SYSTOLIC BLOOD PRESSURE: 153 MMHG | RESPIRATION RATE: 17 BRPM | DIASTOLIC BLOOD PRESSURE: 89 MMHG | HEART RATE: 84 BPM | HEIGHT: 60 IN

## 2022-07-15 PROCEDURE — 77334 RADIATION TREATMENT AID(S): CPT | Mod: 26

## 2022-07-15 PROCEDURE — 99213 OFFICE O/P EST LOW 20 MIN: CPT | Mod: 25

## 2022-07-15 PROCEDURE — 77290 THER RAD SIMULAJ FIELD CPLX: CPT | Mod: 26

## 2022-07-15 NOTE — REVIEW OF SYSTEMS
[Negative] : Heme/Lymph [Fatigue: Grade 1 - Fatigue relieved by rest] : Fatigue: Grade 1 - Fatigue relieved by rest

## 2022-07-15 NOTE — PHYSICAL EXAM
[Respiration, Rhythm And Depth] : normal respiratory rhythm and effort [Normal] : oriented to person, place and time, the affect was normal, the mood was normal and not anxious [de-identified] : bilateral crackles bases [de-identified] : left breast and axillary scar healing well

## 2022-07-15 NOTE — HISTORY OF PRESENT ILLNESS
[FreeTextEntry1] : Ms Womack is a 77 year old  female with PMH of severe anxiety and depression, who is being seen for in person follow up visit.  \par \par Diagnosis: Stage IA pT1b(7mm) N0(0/7) Invasive ductal carcinoma of the LEFT breast, G2, ER positive, MD negative, HER-2 negative. S/p lumpectomy and (0/7) negative SLNB. \par Oncotype DX Score -36 \par \par Onc History:\par 01/25/2022 - Screening mammo : showed possible irregular shaped mass with associated distortion lower inner left breast middle to posterior third which likely correlates with an irregular shaped hypoechoic nodule with associated acoustic shadowing left breast 9:00 6 cm from nipple on screening breast ultrasound. for which additional imaging was rec. \par \par 02/03/2022 Diagnostic Mammo showed a 7 mm irregular mass in the left breast at the 9:00 position. BI-RADS 5. ultrasound-guided core biopsy is advised.\par \par 2/9/2022-  Left breast 9 o'clock 6 cmfn ultrasound guided biopsy core  showed invasive moderately differentiated ductal carcinoma, Marc score 6/9, measuring 0.5 cm, ER + (70%)  MD Negative (0%)  ,HER-2 Negative. Microcalcifications and focal ADH and columnar cell change noted.\par \par 02/21/2022- Breast MRI showed a 5 mm enhancing mass in the left inner breast, recently diagnosed left breast malignancy.\par No other suspicious enhancing masses. BI-RADS 6\par \par 3/7/2022 - LEFT breast lumpectomy and L SLNB.  (Dr. KIANA Yee- surgeon)\par  Patholgy showed\par  - IDC, G2, 7 mm, - DCIS, intermediate grade, at least 1 mm (3/28 slides). Margins negative,  SLNB 0/7 involved. \par  - ER 70%, MD 0%, HER2 negative\par  - [id number], AJCC Stage IA\par Oncotype DX Score -36 \par \par 4/4/22-  Evaluated by Dr. Jain (oncology). Ms. Womack was started on Anastrozole at this time as Oncotype was pending and she wasn't interested in chemotherapy or radiation at that time. \par \par 4/25/22 - Evaluated in the ED for uncontrolled anxiety\par \par 5/13/22 - seen by Dr. Jain (oncology) again in follow up and was doing better psychologically and consented to chemotherapy and to hold Anastrozole while on chemotherapy (CMF with Onpro) followed by Radiation.  \par \par 5/27/22 - Cycle 1 CMF + onpro, Hospitalized after this cycle from 6/3/22 - 6/12/22 for neutropenia/confusion/dizziness. She followed up with Dr. Jain (oncology) on 6/24/22 and Ms. Womack declined further chemotherapy. \par Re-Started on Anastrazole and referral to radiation made.  \par \par Of note:  EKG done 5/13/22 she is noted to have left bundle branch block, to follow up with cardiology. \par \par 71/22 -  telephonic consultation to discuss radiation therapy.   was at home for the visit today and states that she still has feelings of depression and anxiety.  She lives with friends and feels supported by them. \par \par 7/15/22 - presents for in person visit prior to SIM planning scan. Reports feeling well, with some days feeling down and other day feeling well. Denies breast pain, nipple discharge or lumps in the breast. Continues to take Anastrazole daily.

## 2022-07-21 PROCEDURE — 77334 RADIATION TREATMENT AID(S): CPT | Mod: 26

## 2022-07-21 PROCEDURE — 77295 3-D RADIOTHERAPY PLAN: CPT | Mod: 26

## 2022-07-21 PROCEDURE — 77300 RADIATION THERAPY DOSE PLAN: CPT | Mod: 26

## 2022-07-26 PROCEDURE — 77280 THER RAD SIMULAJ FIELD SMPL: CPT | Mod: 26

## 2022-07-27 ENCOUNTER — NON-APPOINTMENT (OUTPATIENT)
Age: 77
End: 2022-07-27

## 2022-07-27 DIAGNOSIS — Z92.3 PERSONAL HISTORY OF IRRADIATION: ICD-10-CM

## 2022-07-27 PROCEDURE — 77427 RADIATION TX MANAGEMENT X5: CPT

## 2022-07-27 PROCEDURE — 77387C: CUSTOM

## 2022-07-27 NOTE — DISEASE MANAGEMENT
[Pathological] : TNM Stage: p [I] : I [TTNM] : 1b [NTNM] : 0 [MTNM] : x [de-identified] : 298cGy [de-identified] : 2604cSk [de-identified] : Left Breast

## 2022-07-27 NOTE — HISTORY OF PRESENT ILLNESS
[FreeTextEntry1] : Ms Womack is a 77 year old  female with PMH of severe anxiety and depression, who is being seen for an on treatment visit.  \par \par Diagnosis: Stage IA pT1b(7mm) N0(0/7) Invasive ductal carcinoma of the LEFT breast, G2, ER positive, [place] negative, HER-2 negative. S/p lumpectomy and (0/7) negative SLNB. \par Oncotype DX Score -36 \par \par Oncologic Management:\par \par 2/9/2022-  Left breast 9 o'clock 6 cmfn ultrasound guided biopsy core  showed invasive moderately differentiated ductal carcinoma, Marc score 6/9, measuring 0.5 cm, ER + (70%)  NC Negative (0%)  ,HER-2 Negative. Microcalcifications and focal ADH and columnar cell change noted.\par \par 02/21/2022- Breast MRI showed a 5 mm enhancing mass in the left inner breast, recently diagnosed left breast malignancy.  No other suspicious enhancing masses. BI-RADS 6\par \par 3/7/2022 - LEFT breast lumpectomy and L SLNB.  (Dr. KIANA Yee- surgeon)\par  Patholgy showed\par  - IDC, G2, 7 mm, - DCIS, intermediate grade, at least 1 mm (3/28 slides). Margins negative,  SLNB 0/7 involved. \par  - ER 70%, [place] 0%, HER2 negative\par  - [id number], AJCC Stage IA\par Oncotype DX Score -36 \par \par 4/4/22-  Evaluated by Dr. Jain (oncology). Ms. Womack was started on Anastrozole at this time as Oncotype was pending and she wasn't interested in chemotherapy or radiation at that time. \par \par 4/25/22 - Evaluated in the ED for uncontrolled anxiety\par \par 5/13/22 - seen by Dr. Jain (oncology) again in follow up and was doing better psychologically and consented to chemotherapy and to hold Anastrozole while on chemotherapy (CMF with Onpro) followed by Radiation.  \par \par 5/27/22 - Cycle 1 CMF + onpro, Hospitalized after this cycle from 6/3/22 - 6/12/22 for neutropenia/confusion/dizziness. She followed up with Dr. Jain (oncology) on 6/24/22 and Ms. Womack declined further chemotherapy. \par Re-Started on Anastrazole and referral to radiation made.  \par \par Of note:  EKG done 5/13/22 she is noted to have left bundle branch block, to follow up with cardiology. \par \par 71/22 -  telephonic consultation to discuss radiation therapy. Seen for in office follow up on 7/15/22 to obtain treatment consent and had SIM planning scan for radiation planning.  \par \par 7/27/22 - started on Radiation Therapy to Left Breast, 2600 cGy in 5 fx planned \par \par 7/27/22 - OTV 1/5 fx completed.  Ms. Womack is doing well, feels a little more forgetful recently.  She is tolerating treatment.  Reinforced importance of skin care and keeping skin moisturized.

## 2022-07-27 NOTE — REVIEW OF SYSTEMS
[Edema Limbs: Grade 0] : Edema Limbs: Grade 0  [Fatigue: Grade 0] : Fatigue: Grade 0 [Localized Edema: Grade 0] : Localized Edema: Grade 0  [Breast Pain: Grade 0] : Breast Pain: Grade 0 [Pruritus: Grade 0] : Pruritus: Grade 0 [Skin Atrophy: Grade 0] : Skin Atrophy: Grade 0 [Skin Hyperpigmentation: Grade 0] : Skin Hyperpigmentation: Grade 0 [Skin Induration: Grade 0] : Skin Induration: Grade 0 [Dermatitis Radiation: Grade 0] : Dermatitis Radiation: Grade 0 [FreeTextEntry6] : Reinforced skin care using non-perfumed moisturizing cream

## 2022-07-28 PROCEDURE — 77387C: CUSTOM

## 2022-07-29 PROCEDURE — 77387C: CUSTOM

## 2022-08-01 ENCOUNTER — NON-APPOINTMENT (OUTPATIENT)
Age: 77
End: 2022-08-01

## 2022-08-01 PROCEDURE — 77387C: CUSTOM

## 2022-08-01 NOTE — REASON FOR VISIT
[Routine On-Treatment] : a routine on-treatment visit for [Breast Cancer] : breast cancer [Friend] : friend

## 2022-08-01 NOTE — DISEASE MANAGEMENT
[Pathological] : TNM Stage: p [I] : I [TTNM] : 1b [NTNM] : 0 [MTNM] : x [de-identified] : 6318zGy [de-identified] : 2606cYh [de-identified] : Left Breast

## 2022-08-01 NOTE — VITALS
DISPLAY PLAN FREE TEXT [Maximal Pain Intensity: 0/10] : 0/10 [Least Pain Intensity: 0/10] : 0/10 [ECOG Performance Status: 2 - Ambulatory and capable of all self care but unable to carry out any work activities] : Performance Status: 2 - Ambulatory and capable of all self care but unable to carry out any work activities. Up and about more than 50% of waking hours

## 2022-08-01 NOTE — HISTORY OF PRESENT ILLNESS
[FreeTextEntry1] : Ms Womack is a 77 year old  female with PMH of severe anxiety and depression, who is being seen for an on treatment visit.  \par \par Diagnosis: Stage IA pT1b(7mm) N0(0/7) Invasive ductal carcinoma of the LEFT breast, G2, ER positive, [place] negative, HER-2 negative. S/p lumpectomy and (0/7) negative SLNB. \par Oncotype DX Score -36 \par \par Oncologic Management:\par \par 2/9/2022-  Left breast 9 o'clock 6 cmfn ultrasound guided biopsy core  showed invasive moderately differentiated ductal carcinoma, Marc score 6/9, measuring 0.5 cm, ER + (70%)  ND Negative (0%)  ,HER-2 Negative. Microcalcifications and focal ADH and columnar cell change noted.\par \par 02/21/2022- Breast MRI showed a 5 mm enhancing mass in the left inner breast, recently diagnosed left breast malignancy.  No other suspicious enhancing masses. BI-RADS 6\par \par 3/7/2022 - LEFT breast lumpectomy and L SLNB.  (Dr. KIANA Yee- surgeon)\par  Patholgy showed\par  - IDC, G2, 7 mm, - DCIS, intermediate grade, at least 1 mm (3/28 slides). Margins negative,  SLNB 0/7 involved. \par  - ER 70%, ND 0%, HER2 negative\par  - [id number], AJCC Stage IA\par Oncotype DX Score -36 \par \par 4/4/22-  Evaluated by Dr. Jain (oncology). Ms. Womack was started on Anastrozole at this time as Oncotype was pending and she wasn't interested in chemotherapy or radiation at that time. \par \par 4/25/22 - Evaluated in the ED for uncontrolled anxiety\par \par 5/13/22 - seen by Dr. Jain (oncology) again in follow up and was doing better psychologically and consented to chemotherapy and to hold Anastrozole while on chemotherapy (CMF with Onpro) followed by Radiation.  \par \par 5/27/22 - Cycle 1 CMF + onpro, Hospitalized after this cycle from 6/3/22 - 6/12/22 for neutropenia/confusion/dizziness. She followed up with Dr. Jain (oncology) on 6/24/22 and Ms. Womack declined further chemotherapy. \par Re-Started on Anastrazole and referral to radiation made.  \par \par Of note:  EKG done 5/13/22 she is noted to have left bundle branch block, to follow up with cardiology. \par \par 71/22 -  telephonic consultation to discuss radiation therapy. Seen for in office follow up on 7/15/22 to obtain treatment consent and had SIM planning scan for radiation planning.  \par \par 7/27/22 - started on Radiation Therapy to Left Breast, 2600 cGy in 5 fx planned \par \par 7/27/22 - OTV 1/5 fx completed.  Ms. Womack is doing well, feels a little more forgetful recently.  She is tolerating treatment.  Reinforced importance of skin care and keeping skin moisturized.  \par \par 8/1/2022 OTV 4/5 Fx completed. The ski is good. Using a moisturizer(unsure of name)

## 2022-08-02 PROCEDURE — 77387C: CUSTOM

## 2022-08-08 ENCOUNTER — RX RENEWAL (OUTPATIENT)
Age: 77
End: 2022-08-08

## 2022-08-12 ENCOUNTER — NON-APPOINTMENT (OUTPATIENT)
Age: 77
End: 2022-08-12

## 2022-09-12 ENCOUNTER — APPOINTMENT (OUTPATIENT)
Dept: RADIATION ONCOLOGY | Facility: CLINIC | Age: 77
End: 2022-09-12

## 2022-09-12 ENCOUNTER — NON-APPOINTMENT (OUTPATIENT)
Age: 77
End: 2022-09-12

## 2022-09-16 ENCOUNTER — RX RENEWAL (OUTPATIENT)
Age: 77
End: 2022-09-16

## 2022-09-20 ENCOUNTER — OUTPATIENT (OUTPATIENT)
Dept: OUTPATIENT SERVICES | Facility: HOSPITAL | Age: 77
LOS: 1 days | Discharge: HOME | End: 2022-09-20

## 2022-09-20 DIAGNOSIS — Z98.51 TUBAL LIGATION STATUS: Chronic | ICD-10-CM

## 2022-09-20 DIAGNOSIS — Z98.49 CATARACT EXTRACTION STATUS, UNSPECIFIED EYE: Chronic | ICD-10-CM

## 2022-09-20 DIAGNOSIS — Z98.890 OTHER SPECIFIED POSTPROCEDURAL STATES: Chronic | ICD-10-CM

## 2022-09-20 DIAGNOSIS — Z12.39 ENCOUNTER FOR OTHER SCREENING FOR MALIGNANT NEOPLASM OF BREAST: ICD-10-CM

## 2022-09-20 DIAGNOSIS — S92.912A UNSPECIFIED FRACTURE OF LEFT TOE(S), INITIAL ENCOUNTER FOR CLOSED FRACTURE: Chronic | ICD-10-CM

## 2022-09-22 NOTE — ED ADULT NURSE NOTE - PRIMARY CARE PROVIDER
Impression: Hordeolum, right upper lid: H00.011. Plan: condition improving. Continue hot compresses BID OU. RTC 6 months x CFM. unknown

## 2022-09-23 ENCOUNTER — APPOINTMENT (OUTPATIENT)
Dept: HEMATOLOGY ONCOLOGY | Facility: CLINIC | Age: 77
End: 2022-09-23

## 2022-09-28 ENCOUNTER — NON-APPOINTMENT (OUTPATIENT)
Age: 77
End: 2022-09-28

## 2022-10-10 ENCOUNTER — RX RENEWAL (OUTPATIENT)
Age: 77
End: 2022-10-10

## 2022-10-27 ENCOUNTER — RX RENEWAL (OUTPATIENT)
Age: 77
End: 2022-10-27

## 2022-10-28 ENCOUNTER — APPOINTMENT (OUTPATIENT)
Dept: INTERNAL MEDICINE | Facility: CLINIC | Age: 77
End: 2022-10-28

## 2022-10-31 ENCOUNTER — APPOINTMENT (OUTPATIENT)
Dept: INTERNAL MEDICINE | Facility: CLINIC | Age: 77
End: 2022-10-31

## 2022-10-31 VITALS
BODY MASS INDEX: 28.86 KG/M2 | HEIGHT: 60 IN | WEIGHT: 147 LBS | TEMPERATURE: 98.2 F | HEART RATE: 103 BPM | OXYGEN SATURATION: 95 %

## 2022-10-31 VITALS — SYSTOLIC BLOOD PRESSURE: 132 MMHG | DIASTOLIC BLOOD PRESSURE: 76 MMHG

## 2022-10-31 PROCEDURE — 99214 OFFICE O/P EST MOD 30 MIN: CPT

## 2022-10-31 NOTE — HISTORY OF PRESENT ILLNESS
[Friend] : friend [FreeTextEntry1] : f/u mult er visits\par pt and friend are limited historians [de-identified] : seen with her tenant/friend\par mult ER visits for dizziness, poor memory--txed and released\par saw neuro prev\par not seeing mental health\par pt does not leave house and sleeps most of the day\par unclear if she can manage her meds but getting assitance from friend and dtg who lives with her\par she has apparent neuro eval scheduled\par

## 2022-10-31 NOTE — ASSESSMENT
[FreeTextEntry1] : reduced adls and function likely  mostly from mental illness\par pt denies feelings of harm to self or others\par pt is to see neuro\par mental health f/.u\par

## 2022-10-31 NOTE — PHYSICAL EXAM
[Normal] : soft, non-tender, non-distended, no masses palpated, no HSM and normal bowel sounds [de-identified] : pt knows day, month, yr, place but is slow to answer

## 2022-11-22 ENCOUNTER — NON-APPOINTMENT (OUTPATIENT)
Age: 77
End: 2022-11-22

## 2022-11-23 NOTE — ASU DISCHARGE PLAN (ADULT/PEDIATRIC) - DO NOT TAKE THE STERI STRIPS OFF
Statement Selected Complex Repair And V-Y Plasty Text: The defect edges were debeveled with a #15 scalpel blade.  The primary defect was closed partially with a complex linear closure.  Given the location of the remaining defect, shape of the defect and the proximity to free margins a V-Y plasty was deemed most appropriate for complete closure of the defect.  Using a sterile surgical marker, an appropriate advancement flap was drawn incorporating the defect and placing the expected incisions within the relaxed skin tension lines where possible.    The area thus outlined was incised deep to adipose tissue with a #15 scalpel blade.  The skin margins were undermined to an appropriate distance in all directions utilizing iris scissors.

## 2022-11-30 ENCOUNTER — RX RENEWAL (OUTPATIENT)
Age: 77
End: 2022-11-30

## 2022-12-11 ENCOUNTER — RX RENEWAL (OUTPATIENT)
Age: 77
End: 2022-12-11

## 2022-12-14 ENCOUNTER — APPOINTMENT (OUTPATIENT)
Dept: RADIATION ONCOLOGY | Facility: CLINIC | Age: 77
End: 2022-12-14

## 2022-12-14 ENCOUNTER — NON-APPOINTMENT (OUTPATIENT)
Age: 77
End: 2022-12-14

## 2022-12-14 NOTE — ASU PREOP CHECKLIST - WEIGHT IN KG
Physical Therapy Discharge    Date: 12/14/2022  Total Number of Visits: Visit count could not be calculated. Make sure you are using a visit which is associated with an episode.  Referred by: Kelley Ramires MD  Medical Diagnosis (from order):     Patient discharged due to not scheduling more appointments.  Status of goals: per status in last daily treatment note    Gary was seen for 3 PT visits.  He retrurned to his Physician to inquire about treatment options and imaging.  Goal were not met due to pain limiting factors and not returning for further physical therapy interventions and assessments.       Not Applicable/Other     72.6

## 2023-01-10 NOTE — HISTORY OF PRESENT ILLNESS
[FreeTextEntry1] : The patient is a 77 year old female referred for consultation by Dr. 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 She also admits that she is being worked up for a memory loss disorder and is unsure of her medical history.\par \par Her most recent imaging:\par \par 1/25/2022 B/L SM (NW) TC 3%, fatty\par  - B/L benign-appearing masses and calcifications\par  - L lower inner middle- to posterior third possible irregular mass -> DM/US\par \par 1/25/2022 B/L US\par  - R neg\par  - L 9:00 N6 6 mm irregular hypoechoic nodule -> DM/US\par  - BR0\par \par 2/3/2022 L DM\par  - L 9:00 middle depth sub-cm spiculated mass with surrounding circumscribed sub-cm masses\par \par 2/3/2022 L US\par  - L 9:00 N6 7 x 4 x 6 mm irregular hypoechoic mass -> Bx\par  - L 8:00 N5 6 mm cyst cluster\par  - no suspicious LNs\par  - BR5\par \par 2/09/2022 L USG-CNB\par  - L 9:00 N6 (wing): IDC, moderately differentiated, focal ADH; ER 70% mod-strong, SD 0, HER2 1+\par \par She is unable to tell me her medical history. From a chart review, she has COPD, depression, HTN, HLD, LBBB, OA, memory loss disorder. Her surgical history includes B/L lumpectomy (pt unable to recall if this is accurate), foot surgery, D&C, cataract surgery\par \par She reports that her parents both had some kind of cancer, but she is unsure what type. Her brother had a tumor in his cheek removed.\par \par 2/21/2022 MRI\par  - R negative\par  - L inner breast biopsy marker 5 mm enhancing mass\par  - Neg LNs\par \par 3/07/2022 L lumpectomy, L SLNB\par  - IDC, G2, 7 mm\par  - DCIS, intermediate grade, at least 1 mm (3/28 slides)\par  - Margins negative\par  - SLNB 0/7\par  - ER 70%, SD 0%, HER2 negative\par  - [id number], AJCC Stage IA\par  - ODX 36\par \par 3/28/2022:\par The patient reports still having some arm pain. Denies numbness, denies swelling. Denies pain in the breast. Denies fevers/chills/redness.\par \par 6/22/2022:\par Pt met with medical oncology several times, accompanied by friends. After multiple discussions about the patient's anxiety, forgetfulness, ability to cope, the patient eventually agreed to proceed with CMF, starting 5/27/22. From 6/3/22-6/12/22, the patient was admitted to [facility] for confusion, weakness, and hypokalemia. CT head was negative, Pyschiatry saw the patient for depression and was started on medications, and recommended to follow with outpatient geriatric psychiatry.\par Today, the patient feels better. Per Ranjit, he and another friend are helping her with her medications. She continues to be forgetful. They are scheduled to resume chemo this Friday and have an appt with Dr. Jain.\par \par Interval History: Patient completed radiation therapy from 7/27/2022 to 8/1/2022 ________

## 2023-01-10 NOTE — CONSULT LETTER
[Dear  ___] : Dear  [unfilled], [Courtesy Letter:] : I had the pleasure of seeing your patient, [unfilled], in my office today. [Please see my note below.] : Please see my note below. [Consult Closing:] : Thank you very much for allowing me to participate in the care of this patient.  If you have any questions, please do not hesitate to contact me. [Sincerely,] : Sincerely, [FreeTextEntry3] : Lexie Yee MD\par Breast Surgeon\par Division of Surgical Oncology\par Department of Surgery\par [address] [place] [address]\[place] [address] \par Tel: [phone]\par Fax: [phone]\par Email: [email or web address]

## 2023-01-10 NOTE — ASSESSMENT
[FreeTextEntry1] : The patient is a 77 year old female referred for consultation by Dr. Drake Carr for Left breast IDC now s/p Left breast lumpectomy, L SLNB on 3/7/22.\par \par 3/07/2022 L lumpectomy, L SLNB\par  - IDC, G2, 7 mm\par  - DCIS, intermediate grade, at least 1 mm (3/28 slides)\par  - Margins negative\par  - SLNB 0/7\par  - ER 70%, MO 0%, HER2 negative\par  - [id number], AJCC Stage IA\par  - ODX 36\par \par 6/22/2022: She started adjuvant CMF 5/27/2022, complicated by weakness and confusion after the first cycle. She was admitted to [facility] 6/3-6/12/22 where she was found to be hyponatremic. Psychiatry saw the patient and added medications and optimized her regimen. Currently doing better but still forgetful. \par \par Exam today\par \par Plan:\par  - f/u med oncology - ?Plan for further CMF??\par  - f/u outpatient psychiatry\par  - Pending imaging 1/2023 B/L SM\par  - RTO after mammogram

## 2023-01-11 ENCOUNTER — APPOINTMENT (OUTPATIENT)
Dept: SURGICAL ONCOLOGY | Facility: CLINIC | Age: 78
End: 2023-01-11

## 2023-01-17 NOTE — PATIENT PROFILE ADULT - NSTRANSFERBELONGINGSDISPO_GEN_A_NUR
"""Recommends patient to Continue Theratears both eyes three times a day . Continue Lotemax "" Ear Irrigation Procedure Note    Ear irrigation procedure was performed using a WaterPik / Elephant ear to the left ear(s) for cerumen impaction.  The remaining wax and debris was removed with curette  instrumentation.  The procedure was successful.  The patient had no complications    with patient

## 2023-03-08 ENCOUNTER — RX RENEWAL (OUTPATIENT)
Age: 78
End: 2023-03-08

## 2023-03-13 ENCOUNTER — RX RENEWAL (OUTPATIENT)
Age: 78
End: 2023-03-13

## 2023-03-20 ENCOUNTER — RX RENEWAL (OUTPATIENT)
Age: 78
End: 2023-03-20

## 2023-03-21 ENCOUNTER — APPOINTMENT (OUTPATIENT)
Dept: INTERNAL MEDICINE | Facility: CLINIC | Age: 78
End: 2023-03-21
Payer: MEDICARE

## 2023-03-21 VITALS
DIASTOLIC BLOOD PRESSURE: 76 MMHG | WEIGHT: 150 LBS | OXYGEN SATURATION: 96 % | SYSTOLIC BLOOD PRESSURE: 116 MMHG | TEMPERATURE: 98.3 F | HEIGHT: 60 IN | BODY MASS INDEX: 29.45 KG/M2 | HEART RATE: 106 BPM

## 2023-03-21 DIAGNOSIS — R41.3 OTHER AMNESIA: ICD-10-CM

## 2023-03-21 PROCEDURE — 99214 OFFICE O/P EST MOD 30 MIN: CPT

## 2023-03-21 RX ORDER — ALPRAZOLAM 0.25 MG/1
0.25 TABLET ORAL
Qty: 60 | Refills: 0 | Status: COMPLETED | COMMUNITY
Start: 2020-12-08 | End: 2023-03-21

## 2023-03-21 NOTE — ASSESSMENT
[FreeTextEntry1] : mental health issues prob impacting memory--doubt true dementia\par psychiatry eval/neuro eval advised to pt and roommate\par labs\par renew meds

## 2023-03-21 NOTE — HISTORY OF PRESENT ILLNESS
[Friend] : friend [FreeTextEntry1] : f/u htn, lipids\par seen with friend, roommate [de-identified] : low level of function\par doesn’t leave house\par her friend/housemate manages her meds\par meds same

## 2023-03-22 ENCOUNTER — LABORATORY RESULT (OUTPATIENT)
Age: 78
End: 2023-03-22

## 2023-03-22 LAB
ALBUMIN SERPL ELPH-MCNC: 4.2 G/DL
ALP BLD-CCNC: 117 U/L
ALT SERPL-CCNC: 21 U/L
ANION GAP SERPL CALC-SCNC: 13 MMOL/L
AST SERPL-CCNC: 24 U/L
BASOPHILS # BLD AUTO: 0.04 K/UL
BASOPHILS NFR BLD AUTO: 0.4 %
BILIRUB SERPL-MCNC: 0.8 MG/DL
BUN SERPL-MCNC: 15 MG/DL
CALCIUM SERPL-MCNC: 10.7 MG/DL
CHLORIDE SERPL-SCNC: 98 MMOL/L
CHOLEST SERPL-MCNC: 197 MG/DL
CO2 SERPL-SCNC: 27 MMOL/L
CREAT SERPL-MCNC: 0.84 MG/DL
EGFR: 71 ML/MIN/1.73M2
EOSINOPHIL # BLD AUTO: 0.09 K/UL
EOSINOPHIL NFR BLD AUTO: 0.9 %
ESTIMATED AVERAGE GLUCOSE: 111 MG/DL
GLUCOSE SERPL-MCNC: 117 MG/DL
HBA1C MFR BLD HPLC: 5.5 %
HCT VFR BLD CALC: 45.6 %
HDLC SERPL-MCNC: 55 MG/DL
HGB BLD-MCNC: 14.8 G/DL
IMM GRANULOCYTES NFR BLD AUTO: 0.3 %
LDLC SERPL CALC-MCNC: 122 MG/DL
LYMPHOCYTES # BLD AUTO: 1.58 K/UL
LYMPHOCYTES NFR BLD AUTO: 15.6 %
MAN DIFF?: NORMAL
MCHC RBC-ENTMCNC: 28.3 PG
MCHC RBC-ENTMCNC: 32.5 GM/DL
MCV RBC AUTO: 87.2 FL
MONOCYTES # BLD AUTO: 0.71 K/UL
MONOCYTES NFR BLD AUTO: 7 %
NEUTROPHILS # BLD AUTO: 7.66 K/UL
NEUTROPHILS NFR BLD AUTO: 75.8 %
NONHDLC SERPL-MCNC: 142 MG/DL
PLATELET # BLD AUTO: 247 K/UL
POTASSIUM SERPL-SCNC: 3.6 MMOL/L
PROT SERPL-MCNC: 6.4 G/DL
RBC # BLD: 5.23 M/UL
RBC # FLD: 14.9 %
SODIUM SERPL-SCNC: 138 MMOL/L
TRIGL SERPL-MCNC: 96 MG/DL
TSH SERPL-ACNC: 1.26 UIU/ML
VIT B12 SERPL-MCNC: 428 PG/ML
WBC # FLD AUTO: 10.11 K/UL

## 2023-03-23 LAB
25(OH)D3 SERPL-MCNC: 35.7 NG/ML
CALCIUM SERPL-MCNC: 10.6 MG/DL
PARATHYROID HORMONE INTACT: 58 PG/ML
PHOSPHATE SERPL-MCNC: 3.5 MG/DL

## 2023-03-24 ENCOUNTER — NON-APPOINTMENT (OUTPATIENT)
Age: 78
End: 2023-03-24

## 2023-03-27 LAB
ALBUMIN MFR SERPL ELPH: 60.2 %
ALBUMIN SERPL-MCNC: 3.9 G/DL
ALBUMIN/GLOB SERPL: 1.6 RATIO
ALPHA1 GLOB MFR SERPL ELPH: 5.5 %
ALPHA1 GLOB SERPL ELPH-MCNC: 0.4 G/DL
ALPHA2 GLOB MFR SERPL ELPH: 11 %
ALPHA2 GLOB SERPL ELPH-MCNC: 0.7 G/DL
B-GLOBULIN MFR SERPL ELPH: 11.8 %
B-GLOBULIN SERPL ELPH-MCNC: 0.8 G/DL
GAMMA GLOB FLD ELPH-MCNC: 0.7 G/DL
GAMMA GLOB MFR SERPL ELPH: 11.5 %
INTERPRETATION SERPL IEP-IMP: NORMAL
PROT SERPL-MCNC: 6.4 G/DL
PROT SERPL-MCNC: 6.4 G/DL

## 2023-03-28 NOTE — H&P PST ADULT - FALL HARM RISK - PT AGE POPULATION HIDDEN
Day 6 of outpatient radiation to the left chest wall, RYDER. Tolerating treatment well. No skin issues seen or reported.Using Miaderm cream BID.  
Adult

## 2023-04-11 NOTE — PROGRESS NOTE ADULT - PROBLEM SELECTOR PLAN 6
- heme/onc consulted: no plan for chemo as inpatient; pt to f/u with her med oncologist Marilyn Putnam at [facility] after d/c - PT rec: home, no skilled PT home Colchicine Counseling:  Patient counseled regarding adverse effects including but not limited to stomach upset (nausea, vomiting, stomach pain, or diarrhea).  Patient instructed to limit alcohol consumption while taking this medication.  Colchicine may reduce blood counts especially with prolonged use.  The patient understands that monitoring of kidney function and blood counts may be required, especially at baseline. The patient verbalized understanding of the proper use and possible adverse effects of colchicine.  All of the patient's questions and concerns were addressed.

## 2023-04-18 ENCOUNTER — OUTPATIENT (OUTPATIENT)
Dept: OUTPATIENT SERVICES | Facility: HOSPITAL | Age: 78
LOS: 1 days | Discharge: HOME | End: 2023-04-18

## 2023-04-18 DIAGNOSIS — Z98.890 OTHER SPECIFIED POSTPROCEDURAL STATES: Chronic | ICD-10-CM

## 2023-04-18 DIAGNOSIS — Z98.51 TUBAL LIGATION STATUS: Chronic | ICD-10-CM

## 2023-04-18 DIAGNOSIS — Z12.39 ENCOUNTER FOR OTHER SCREENING FOR MALIGNANT NEOPLASM OF BREAST: ICD-10-CM

## 2023-04-18 DIAGNOSIS — S92.912A UNSPECIFIED FRACTURE OF LEFT TOE(S), INITIAL ENCOUNTER FOR CLOSED FRACTURE: Chronic | ICD-10-CM

## 2023-04-18 DIAGNOSIS — Z98.49 CATARACT EXTRACTION STATUS, UNSPECIFIED EYE: Chronic | ICD-10-CM

## 2023-04-19 ENCOUNTER — APPOINTMENT (OUTPATIENT)
Dept: HEMATOLOGY ONCOLOGY | Facility: CLINIC | Age: 78
End: 2023-04-19
Payer: MEDICARE

## 2023-04-19 VITALS
DIASTOLIC BLOOD PRESSURE: 87 MMHG | SYSTOLIC BLOOD PRESSURE: 139 MMHG | RESPIRATION RATE: 16 BRPM | HEART RATE: 93 BPM | OXYGEN SATURATION: 99 % | BODY MASS INDEX: 29.28 KG/M2 | WEIGHT: 149.91 LBS | TEMPERATURE: 97.5 F

## 2023-04-19 DIAGNOSIS — C50.912 MALIGNANT NEOPLASM OF UNSPECIFIED SITE OF LEFT FEMALE BREAST: ICD-10-CM

## 2023-04-19 PROCEDURE — 99214 OFFICE O/P EST MOD 30 MIN: CPT

## 2023-04-19 NOTE — PHYSICAL EXAM
[Restricted in physically strenuous activity but ambulatory and able to carry out work of a light or sedentary nature] : Status 1- Restricted in physically strenuous activity but ambulatory and able to carry out work of a light or sedentary nature, e.g., light house work, office work [Normal] : no JVD, no calf tenderness, venous stasis changes, varices [de-identified] : healing left breast lumpectomy and axillary scar

## 2023-04-19 NOTE — ASSESSMENT
[FreeTextEntry1] : In summary, Ms. MARVIN CHISHOLM is a 77 year old postmenopausal female with stage IA (T1b, N0, Mx) ER positive, CA negative, HER-2/susan negative invasive ductal carcinoma of the left breast. She is status post lumpectomy and ALNbx 3/2022. She has h/o severe anxiety and depression. ODX reported 4/13/22: RS 36\par \par \par Patient started chemotherapy after long discussion.  She received CMF 5/2022.  She will admitted with neutropenic fever and other complications secondary to chemotherapy.\par 6/24/22 : she was hospitalized after C1 of chemo. She had neutropenia. \par She is been home x 2 weeks and still feeling tired\par She has appt with psyche for her mental issues.  Recommend to continue psych follow-up closely\par We discussed risks of continuing chemotherapy as well as benefits.  We discussed potential dose reduction.  Patient does not wish to continue chemotherapy given significant toxicity with the first cycle.  She will be sent for radiation oncology consultation.  She will restart anastrozole in the meantime.  Patient and her friend are both in agreement.\par \par 4/19/2023\par 1. Breast ca- Ms. MARVIN CHISHOLM  is tolerating AI well, good compliance. She has mild side effects from the treatment. Continue treatment x 5-10 yrs . Annual breast imaging due now ordered\par 2. Osteoporosis:DEXA- 04/15/2022: T score - 2.5 osteoporosis femoral neck  Concern for worsening bone density and fractures due to anastrozole.\par Recommended to start prolia. Risks benefits reviewed in detail. Recommend dental evaluation and schedule prolia in next 2 weeks.referral for dentist vis [facility] sent  She will continue calcium and vitamin D\par 3. Low Vitamin D: concern for worsening bone loss due to anastrozole and low vit D. \par 4. High cholesterol/CAD risk factors: Concern for worsening cholesterol/CAD risk factors due to anastrozole. Pt is on Lipitor. Lipid profile annually. Life style modifications d/w her.\par \par 	\par RTC 6 m\par  \par \par \par

## 2023-04-19 NOTE — HISTORY OF PRESENT ILLNESS
[de-identified] : Ms. Olga Womack is a 77 year old female here for an evaluation of breast cancer. Her oncologic history is as follows:\par \par She underwent routine breast imaging on 01/25/2022 (BI-RADS 0)  which showed possible irregular shaped mass with associated distortion lower inner left breast middle to posterior third which likely correlates with an irregular shaped hypoechoic nodule with associated acoustic shadowing left breast 9:00 6 cm from nipple on screening breast ultrasound. for which additional imaging was rec. Additional left breast imaging on 02/03/2022 (BI-RADS 5) showed a 7 mm irregular mass in the left breast at the 9:00 position for which ultrasound-guided core biopsy is advised.\par \par  She underwent  left breast 9 o'clock 6cmfn ultrasound guided biopsy core on 2/9/2022 which showed invasive moderately differentiated ductal carcinoma, [place] score 6/9, measuring 0.5 cm ER +, 70%, PgR Negative,HER-2 Negative. Microcalcifications and focal ADH and  columnar cell change noted.\par \par She underwent a breast MRI on 02/21/2022 (BI-RADS 6) which showed  a 5 mm enhancing mass in the left inner breast, recently diagnosed left breast malignancy.\par No other suspicious enhancing masses. \par \par She underwent left breast lumpectomy: on  3/7/2022 \par 3/07/2022 L lumpectomy, L SLNB\par  - IDC, G2, 7 mm\par  - DCIS, intermediate grade, at least 1 mm (3/28 slides)\par  - Margins negative\par  - SLNB 0/7\par  - ER 70%, MS 0%, HER2 negative\par  - [id number], AJCC Stage IA\par \par Initial consult 4/4/22- AI prescribed\par ODX reported 4/13/22: RS 36\par D/w pt over the phone 4/14/22- rec to come for visit\par \par She is here today 4/25/22 to discuss ODX. \par She doesn’t know why she is here today\par Pt reports she is forgetful and has no recollection of ODX discussion or visit ith me 3 weeks ago\par She is having major anxiety and reports " acting crazy" at home. She lives with friends Dianne and Deidre\par She is here with friend- Shania. Shania reports pt is very forgetful lately. Was here last time with friend Dianne but she doesn’t remember \par She says that she thought she was going to see someone for "acting crazy" \par She is tearful and reports " she cant think straight". She dosent remember the name of her psychiatrist or when she was seen last. \par Denies SI or HI \par After a lot of redirecting, she wanted to discuss cancer treatment option. We discussed CMF chemo but she is not interested. pt needs to be evaluated by psychiatrist first to control anxiety issues and then have better discussion re risks/benefits of chemo. Given her age, comorbidities and uncontrolled anxiety- may not be able to tolerate anything more than AI but will need a discussion about her options when she is able to comprehend risks/benefits. \par \par addendum 4/25: Pt discharged from ER w/o psyche intervention\par addendum 4/26: referred to see Dr Marie urgently\par addendum 4/27: Dr Marie rec to see her primary psychiatrist, Dr Mahajan\par addendum 4/28- spoke to pt. She says she is alone and doesn’t know how to place call to make appt. She says Her mind isnt working.\par  My office conference her in with Dr Mahajan office. She declined an earlier appt and wanted to keep appt for next month. \par \par We will schedule office visit in 1-2 weeks to f/u \par Continue AI in the meantime\par \par 5/13/22: She is here today with friend Ranjit. They live together and he is well aware of her medical conditions\par She is in better mood today. Says she is here today because she has an appt to discuss cancer treatment. Not tearful or anxious TODAY, able to have a conversation\par Ranjit had RT for prostate cancer and is very supportive \par She is f/b psychiatrist for anxiety/depression. f/b Dr Mahajan \par We discussed CMF chemo and RT due to high risk ODX\par \par I discussed the expected and unexpected side effects of chemotherapy, including but not limited to hair loss, fatigue, change in taste, mouth sores, nausea, vomiting, diarrhea, infusion reaction, allergic reaction, significant myelosuppression, need for blood transfusion, infection, bleeding, cardiac toxicity, neuropathy, chemical cystitis, kidney injury, nerve pain, muscle pain and detrimental effect on liver, lung and heart function. I also discussed a small but potential risk of development of acute leukemia with the use of cyclophosphamide therapy.\par \par The patient understood all the potential side effects and signed an informed consent after discussing the risks, benefits and alternatives.\par Chemo consent obtained \par Patient is willing to do chemo. Concerned about transportation.  called\par  Holding AI while on chemo\par Case was presented in the TB last week. Consensus was to give CMF chemo f/b RT\par Will refer for RT after chemo\par \par Here to start CMF + onpro #1 today 5/27/22 Counts good \par Chemo s/e discussed,\par She reports being very forgetful . I reminded her to HOLD anastrozole during Chemo  \par Medication for symptom management reviewed and questions answered\par EKG done 5/13/22 she is noted to have left bundle branch block and will f/u with cardio oncology , \par Patient is a anxious about stating chemo today\par Emotional support given, Written instructions given  [de-identified] : In summary, Ms. MARVIN CHISHOLM is a 77 year old postmenopausal female with stage IA (T1b, N0, Mx) ER positive, MT negative, HER-2/susan negative invasive ductal carcinoma of the left breast. She is status post lumpectomy and ALNbx 3/2022. ODX: RS 36 She has h/o severe anxiety and depression.\par \par 6/24/22 : she was hospitalized after C1 of chemo. She had neutropenia. \par She is been home x 2 weeks and still feeling tired\par She has appt with psyche for her mental issues.\par We discussed risks of continuing chemotherapy as well as benefits.  We discussed potential dose reduction.  Patient does not wish to continue chemotherapy given significant toxicity with the first cycle.  She will be sent for radiation oncology consultation.  She will restart anastrozole in the meantime.  Patient and her friend are both in agreement.\par \par 4/19/2023\par Takes AI daily, good compliance. She denies aches/pain, hot flashes, vag dryness, excessive fatigue, GI s/e, hair loss. She is active, no change in energy, wt or appetite. \par  Has not seen GYN : no PMB\par mammogram - Due ordered today\par DEXA- 04/15/2022: Osteoporosis T score - 2.5 osteoporosis femoral neck  \par Bone density reviewed with the patient. \par Recommended to start prolia. Risks benefits reviewed in detail. Recommend dental evaluation and schedule prolia in next 2 weeks. She will continue calcium and vitamin D\par Will send referral for dentist vis [facility].\par

## 2023-06-14 NOTE — H&P PST ADULT - NS PRO LACT YNNA
What Type Of Note Output Would You Prefer (Optional)?: Bullet Format What Is The Reason For Today's Visit?: Full Body Skin Examination with No Concerns What Is The Reason For Today's Visit? (Being Monitored For X): concerning skin lesions on a periodic basis no

## 2023-07-04 NOTE — BH CONSULTATION LIAISON PROGRESS NOTE - NSBHCONSULTRECOMMENDOTHER_PSY_A_CORE FT
unclear if systolic, diastolic. No prior ECHO in chart. Euvolemic on exam.  -HOLD nephrotoxin agents in the setting of KAYLA, can give Bumex 1mg IVP for fluid overload  - C/w carvedilol, HOLD torsemide  - Monitor I/O, daily intake, low salt diet   - F/u on ECHO TTE add Effexor XR 37.5 PO AM

## 2023-07-18 ENCOUNTER — RX RENEWAL (OUTPATIENT)
Age: 78
End: 2023-07-18

## 2023-08-15 ENCOUNTER — APPOINTMENT (OUTPATIENT)
Dept: ORTHOPEDIC SURGERY | Facility: CLINIC | Age: 78
End: 2023-08-15

## 2023-08-23 NOTE — PHYSICAL THERAPY INITIAL EVALUATION ADULT - GAIT PATTERN USED, PT EVAL
IMPLANTABLE CARDIOVERTER DEFIBRILLATOR SHARED DECISION-MAKING FORM    Discussion Date: ___8/23/23________________________        Based on the patient's past history, it has been determined that the patient is a good candidate for an implantable cardioverter defibrillator (ICD). The patient has met the following conditions:        [x] Patient's ejection fraction has been evaluated via appropriate diagnostic study      [x] Patient has been on optimal medical therapy (OMT) for at least 3 months (if appropriate)      [x] Patient and physician have discussed the implantable cardioverter defibrillator using a shared decision- making tool prior to implantation     The following tool was utilized in the shared decision process for this patient:        [x] [address] [place] [address]      [] Wanda for [facility] Aid      [] [employer] HF: Should I Get an ICD? [] Healthwise Heart Rhythm Problems: Should I Get an ICD? [] [facility] for Patient Centered Decisions      The patient is being referred to the Electrophysiology Program at_________________________ (insert institution) for device implantation. IMPLANTABLE CARDIOVERTER DEFIBRILLATOR INDICATION FORM    This form does not apply to generator changes, system upgrades (unless upgrading or changing from a Pacemaker to ICD), or lead revisions    CRT-D [x] Yes [] No If Yes, Complete Section on CRT-D    DETERMINE GUIDELINE COVERAGE   Patient does not meet coverage if you answer yes to any of the following indications or conditions.    [] Yes  [x] No Irreversible brain damage from pre-existing cerebral disease   [] Yes  [x] No Patient is unable to give informed consent   [] Yes  [x] No Any disease, other than cardiac disease, with likely survival less than one year   [] Yes  [x] No Cardiogenic shock or symptomatic hypotension while in stable baseline rhythm   [] Yes  [x] No Symptoms/Findings making patient a candidate for coronary revascularization   [] Yes  [x] No Acute MI within past 40 days Date of Most Recent Acute MI:   [] Yes  [x]  No CABG or PTCA within past 3 Months Date of Most Recent CABG or PTCA:   DEFIBILLATOR INDICATIONS FOR PRIMARY PREVENTION I or IIa (check applicable indication)   [] Prior MI, Non-Sustained VT, LV Dysfunction,LVEF ? 40%, and inducible VT or sustained VT with EPS Date of EPS:   [] Documented familiar or inherited conditions with a high risk of life-threatening VT, such as long QT syndrome or hypertrophic cardiomyopathy   [] Severe NIDCM, no history of sustained VT or cardiac arrest due to VF and NYHA Class II or III heart failure and LVEF ? 35%, on optimal medical therapy for 3 months   [] Severe IDCM, no history of sustained VT or cardiac arrest due to VF, and NYHA Class II or III heart failure, and LVEF 35%   [] Documented prior MI with LVEF ? 30% and NYHA Class I   DEFIBRILLATOR INDICATIONS FOR SECONDARY PREVENTION I or IIa (check applicable indication)   [x] Documented episode of cardiac arrest due to VF, not due to a transient or reversible cause   [] Spontaneous sustained ventricular tachyarrhythmia not associated with an acute MI and not due to transient reversible causes   [] Unexplained syncope, significant LV dysfunction, and non-ischemic DCM. DEIFBRILLATOR WITH CARDIAC RESYNCHRONIZATION THERAPY (CRT-D) INDICATIONS CLASS I or CLASS IIa   [x] LVEF ? 35%, sinus rhythm, LBBB with a QRS ? 150 ms, and NYHA class II, III, or ambulatory IV symptoms on guideline directed medical therapy. [] LVEF ? 35%, sinus rhythm, LBBB with a  to 149 ms, and NYHA class II, III, or ambulatory IV symptoms on guideline directed medical therapy. [] LVEF ?35%, sinus rhythm, a non-LBBB pattern with a QRS ? 150 ms, and NYHA class III/ambulatory IV symptoms on guideline directed medical  therapy.    [] Atrial fibrillation and LVEF ? 35%, on guideline directed medical therapy if; a) the patient requires ventricular pacing or otherwise meets CRT criteria AND b) AV don ablation or pharmacologic rate control will allow near 100% ventricular pacing with CRT. [] LVEF ? 35%, on guideline directed medical therapy and undergoing new or replacement device with anticipated requirement for significant (>40%)  ventricular pacing. LEFT VENTRICULAR EJECTION FRACTION ASSESSMENT (LVEF)   Date of most Recent LVEF: 8 / 17 / 23  Result 26 % LVEF   FINAL STEPS--COMPLETE FOR ALL PATIENTS   [x] Check when all final step items are complete The following supporting documentation records for an ICD/CRT must be in the chart prior to the start of Procedure    1. ECG (within 30 days of implantation) 2. EP Tracings, if applicable    3. LVEF Results (within 12 months of implantation) 4. Progress Notes/Office dictations including cardiac arrest notes    5. If history of established MI, provide documentation of new pathologic Q waves on ECGs or pathologic findings of healed or healing MI. Examples  include fixed defect on nuclear imaging, Q waves on ECG, hypokinesia or akinesia on echo, scar on MRI    6. Shared Decision-Making Form   For outpatient procedures please send this completed form signed and dated with supporting documents and the orders to the appropriate [address] [place] [address] 4 site. 2-point gait

## 2023-09-27 NOTE — PROGRESS NOTE ADULT - ASSESSMENT
Ryan Parada is here today for Office Visit (Wants something to help stop smoking, refill on thyroid medication. Wants to get labs done testosterone )        Medications: medications verified and updated  Refills needed today? Yes, Medication Pended, Pharmacy verified     Tobacco history: verified     Advanced Directives: No not on file, not interested.    BP >140/90? No    Care Teams Updated? No    Patient Preference for result Communication via:     Cell Phone:   Telephone Information:   Mobile [phone]     Okay to leave a message containing results? Yes    Health Maintenance Due   Topic Date Due   • Hepatitis B Vaccine (1 of 3 - 3-dose series) Never done   • COVID-19 Vaccine (1) Never done   • DTaP/Tdap/Td Vaccine (3 - Td or Tdap) 10/09/2008   • Lung Cancer Screening  Never done   • Shingles Vaccine (1 of 2) Never done   • Depression Screening  05/09/2023   • Influenza Vaccine (1) 09/01/2023      Patient is due for topics as listed above but is not proceeding with Immunization(s) COVID-19, Dtap/Tdap/Td, Hep B, Influenza and Shingles and Lung Cancer Screening at this time.     Immunization History   Administered Date(s) Administered   • TD Adult, Adsorbed 10/09/1998   • Tdap 10/09/1998         PHQ 2:  PHQ 2 Score Adult PHQ 2 Score Adult PHQ 2 Interpretation Little interest or pleasure in activity?   5/9/2022   2:21 PM 0 No further screening needed 0       PHQ 9:          78 y/o F with pmhx of recently diagnosed breast Ca on chemo first session 5/27/2022, anxiety, COPD, HTN, HLD, presented to the ED for new onset worsening confusion and vague symptoms. Labs show leukopenia and hypokalemia but otherwise wnl. Admit for work up of confusion and weakness, concerning for dementia vs. toxic/metabolic/infectious encephalopathy. Pt awaiting MRI brain

## 2023-10-01 PROBLEM — Z92.3 HISTORY OF RADIATION THERAPY: Status: RESOLVED | Noted: 2022-07-27 | Resolved: 2023-10-01

## 2023-10-12 ENCOUNTER — RX RENEWAL (OUTPATIENT)
Age: 78
End: 2023-10-12

## 2023-10-16 ENCOUNTER — RX RENEWAL (OUTPATIENT)
Age: 78
End: 2023-10-16

## 2023-11-13 ENCOUNTER — RX RENEWAL (OUTPATIENT)
Age: 78
End: 2023-11-13

## 2023-11-28 ENCOUNTER — NON-APPOINTMENT (OUTPATIENT)
Age: 78
End: 2023-11-28

## 2023-12-12 ENCOUNTER — OUTPATIENT (OUTPATIENT)
Dept: OUTPATIENT SERVICES | Facility: HOSPITAL | Age: 78
LOS: 1 days | Discharge: HOME | End: 2023-12-12

## 2023-12-12 DIAGNOSIS — Z98.49 CATARACT EXTRACTION STATUS, UNSPECIFIED EYE: Chronic | ICD-10-CM

## 2023-12-12 DIAGNOSIS — Z98.890 OTHER SPECIFIED POSTPROCEDURAL STATES: Chronic | ICD-10-CM

## 2023-12-12 DIAGNOSIS — Z98.51 TUBAL LIGATION STATUS: Chronic | ICD-10-CM

## 2023-12-12 DIAGNOSIS — C50.912 MALIGNANT NEOPLASM OF UNSPECIFIED SITE OF LEFT FEMALE BREAST: ICD-10-CM

## 2023-12-12 DIAGNOSIS — S92.912A UNSPECIFIED FRACTURE OF LEFT TOE(S), INITIAL ENCOUNTER FOR CLOSED FRACTURE: Chronic | ICD-10-CM

## 2023-12-13 ENCOUNTER — RESULT REVIEW (OUTPATIENT)
Age: 78
End: 2023-12-13

## 2023-12-13 ENCOUNTER — APPOINTMENT (OUTPATIENT)
Dept: HEMATOLOGY ONCOLOGY | Facility: CLINIC | Age: 78
End: 2023-12-13
Payer: MEDICARE

## 2023-12-13 VITALS
TEMPERATURE: 97 F | DIASTOLIC BLOOD PRESSURE: 75 MMHG | HEART RATE: 103 BPM | BODY MASS INDEX: 28.85 KG/M2 | SYSTOLIC BLOOD PRESSURE: 128 MMHG | OXYGEN SATURATION: 97 % | RESPIRATION RATE: 16 BRPM | WEIGHT: 147.71 LBS

## 2023-12-13 DIAGNOSIS — C50.919 MALIGNANT NEOPLASM OF UNSPECIFIED SITE OF UNSPECIFIED FEMALE BREAST: ICD-10-CM

## 2023-12-13 DIAGNOSIS — M81.0 AGE-RELATED OSTEOPOROSIS W/OUT CURRENT PATHOLOGICAL FRACTURE: ICD-10-CM

## 2023-12-13 DIAGNOSIS — Z79.811 LONG TERM (CURRENT) USE OF AROMATASE INHIBITORS: ICD-10-CM

## 2023-12-13 LAB
HCT VFR BLD CALC: 39.7 % — SIGNIFICANT CHANGE UP (ref 34.5–45)
HCT VFR BLD CALC: 39.7 % — SIGNIFICANT CHANGE UP (ref 34.5–45)
HGB BLD-MCNC: 13.5 G/DL — SIGNIFICANT CHANGE UP (ref 11.5–15.5)
HGB BLD-MCNC: 13.5 G/DL — SIGNIFICANT CHANGE UP (ref 11.5–15.5)
MCHC RBC-ENTMCNC: 28.4 PG — SIGNIFICANT CHANGE UP (ref 27–34)
MCHC RBC-ENTMCNC: 28.4 PG — SIGNIFICANT CHANGE UP (ref 27–34)
MCHC RBC-ENTMCNC: 34 G/DL — SIGNIFICANT CHANGE UP (ref 32–36)
MCHC RBC-ENTMCNC: 34 G/DL — SIGNIFICANT CHANGE UP (ref 32–36)
MCV RBC AUTO: 83.4 FL — SIGNIFICANT CHANGE UP (ref 80–100)
MCV RBC AUTO: 83.4 FL — SIGNIFICANT CHANGE UP (ref 80–100)
PLATELET # BLD AUTO: 234 K/UL — SIGNIFICANT CHANGE UP (ref 150–400)
PLATELET # BLD AUTO: 234 K/UL — SIGNIFICANT CHANGE UP (ref 150–400)
RBC # BLD: 4.76 M/UL — SIGNIFICANT CHANGE UP (ref 3.8–5.2)
RBC # BLD: 4.76 M/UL — SIGNIFICANT CHANGE UP (ref 3.8–5.2)
RBC # FLD: 14.2 % — SIGNIFICANT CHANGE UP (ref 10.3–14.5)
RBC # FLD: 14.2 % — SIGNIFICANT CHANGE UP (ref 10.3–14.5)
WBC # BLD: 8.56 K/UL — SIGNIFICANT CHANGE UP (ref 3.8–10.5)
WBC # BLD: 8.56 K/UL — SIGNIFICANT CHANGE UP (ref 3.8–10.5)
WBC # FLD AUTO: 8.56 K/UL — SIGNIFICANT CHANGE UP (ref 3.8–10.5)
WBC # FLD AUTO: 8.56 K/UL — SIGNIFICANT CHANGE UP (ref 3.8–10.5)

## 2023-12-13 PROCEDURE — 99214 OFFICE O/P EST MOD 30 MIN: CPT

## 2023-12-13 RX ORDER — ALENDRONATE SODIUM 70 MG/1
70 TABLET ORAL
Qty: 4 | Refills: 5 | Status: ACTIVE | COMMUNITY
Start: 2023-12-13 | End: 1900-01-01

## 2023-12-13 NOTE — END OF VISIT
[Time Spent: ___ minutes] : I have spent [unfilled] minutes of time on the encounter. [FreeTextEntry3] : Patient being seen as per physician's primary plan of care.\par

## 2023-12-14 ENCOUNTER — APPOINTMENT (OUTPATIENT)
Dept: INTERNAL MEDICINE | Facility: CLINIC | Age: 78
End: 2023-12-14
Payer: MEDICARE

## 2023-12-14 ENCOUNTER — NON-APPOINTMENT (OUTPATIENT)
Age: 78
End: 2023-12-14

## 2023-12-14 VITALS
HEIGHT: 60 IN | BODY MASS INDEX: 28.86 KG/M2 | TEMPERATURE: 97.4 F | WEIGHT: 147 LBS | RESPIRATION RATE: 16 BRPM | HEART RATE: 98 BPM | SYSTOLIC BLOOD PRESSURE: 127 MMHG | DIASTOLIC BLOOD PRESSURE: 77 MMHG | OXYGEN SATURATION: 96 %

## 2023-12-14 DIAGNOSIS — E78.5 HYPERLIPIDEMIA, UNSPECIFIED: ICD-10-CM

## 2023-12-14 DIAGNOSIS — F41.9 ANXIETY DISORDER, UNSPECIFIED: ICD-10-CM

## 2023-12-14 DIAGNOSIS — F41.8 OTHER SPECIFIED ANXIETY DISORDERS: ICD-10-CM

## 2023-12-14 DIAGNOSIS — I10 ESSENTIAL (PRIMARY) HYPERTENSION: ICD-10-CM

## 2023-12-14 DIAGNOSIS — R07.89 OTHER CHEST PAIN: ICD-10-CM

## 2023-12-14 PROBLEM — M81.0 OSTEOPOROSIS: Status: ACTIVE | Noted: 2023-04-19

## 2023-12-14 PROBLEM — Z79.811 USE OF ANASTROZOLE: Status: ACTIVE | Noted: 2023-04-19

## 2023-12-14 PROCEDURE — 93000 ELECTROCARDIOGRAM COMPLETE: CPT

## 2023-12-14 PROCEDURE — 99214 OFFICE O/P EST MOD 30 MIN: CPT | Mod: 25

## 2023-12-14 NOTE — PHYSICAL EXAM
[No Acute Distress] : no acute distress [Normal] : soft, non-tender, non-distended, no masses palpated, no HSM and normal bowel sounds [de-identified] : cerumen left ear [de-identified] : reproducible chest wall pain

## 2023-12-14 NOTE — HISTORY OF PRESENT ILLNESS
[Formal Caregiver] : formal caregiver [FreeTextEntry1] : f/u htn, lipids [de-identified] : seeing neuro (Leonel) [place]--given unspecified meds. Going for neurocog testing her adls have diminsihed acc to housemate/caregiver reports she has chestpain today upon arriving to office associated with anxiety without other cardio--pulm symps no prior reports of chest pain meds reviewed pt is very limited and uncoop historian

## 2023-12-14 NOTE — PHYSICAL EXAM
[Restricted in physically strenuous activity but ambulatory and able to carry out work of a light or sedentary nature] : Status 1- Restricted in physically strenuous activity but ambulatory and able to carry out work of a light or sedentary nature, e.g., light house work, office work [Normal] : affect appropriate [de-identified] : healing left breast lumpectomy and axillary scar

## 2023-12-14 NOTE — ASSESSMENT
[FreeTextEntry1] : In summary, Ms. 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 She is status post lumpectomy and ALNbx 3/2022. She has h/o severe anxiety and depression. ODX reported 4/13/22: RS 36. She received CMF 5/2022. She was admitted with neutropenic fever and other complications secondary to chemotherapy. Declined further chemo and AI started 6/2022  1. Breast ca- Ms. MARVIN CHISHOLM is tolerating AI well, good compliance. She has mild side effects from the treatment. Continue treatment x 5-10 yrs. Annual breast imaging due now ordered 2. Osteoporosis:DEXA- 04/15/2022: T score - 2.5 osteoporosis femoral neck Concern for worsening bone density and fractures due to anastrozole. Bone density reviewed with the patient multiple times. 4/2023 but she refused prolia. Today 12/2023 we discussed fosamax and she is amenable to try . precautions reviewed she will have appointment with dentist, prior to starting medication. no active dental issues. Instructions to take Fosamax reviewed with her. She will take the medicine once a week, empty stomach with full glass of water and stay upright/walk for 30-60 min. She will report throat burning or any unusual sx. She will continue calcium and vitamin D. 3. High cholesterol/CAD risk factors: Concern for worsening cholesterol/CAD risk factors due to anastrozole. Pt is on Lipitor. Lipid profile annually. Life style modifications d/w her.  RTC 6 m

## 2023-12-14 NOTE — ASSESSMENT
[FreeTextEntry1] : check ecg, labs advised housemate/caregiver to take pt to ER if any further chest pain h202 with ear syringe to left ear--pt refuses ENT eval

## 2023-12-15 LAB
CHOLEST SERPL-MCNC: 174 MG/DL
HDLC SERPL-MCNC: 48 MG/DL
LDLC SERPL CALC-MCNC: 112 MG/DL
NONHDLC SERPL-MCNC: 126 MG/DL
TRIGL SERPL-MCNC: 75 MG/DL
TROPONIN-T, HIGH SENSITIVITY: 19 NG/L

## 2023-12-18 RX ORDER — NYSTATIN AND TRIAMCINOLONE ACETONIDE 100000; 1 MG/G; MG/G
100000-0.1 CREAM TOPICAL TWICE DAILY
Qty: 1 | Refills: 0 | Status: COMPLETED | COMMUNITY
Start: 2022-01-14 | End: 2023-12-18

## 2023-12-23 LAB
ALBUMIN SERPL ELPH-MCNC: 4 G/DL
ALP BLD-CCNC: 122 U/L
ALT SERPL-CCNC: 30 U/L
ANION GAP SERPL CALC-SCNC: 14 MMOL/L
AST SERPL-CCNC: 27 U/L
BILIRUB SERPL-MCNC: 0.7 MG/DL
BUN SERPL-MCNC: 19 MG/DL
CALCIUM SERPL-MCNC: 10.1 MG/DL
CANCER AG27-29 SERPL-ACNC: 45.7 U/ML
CEA SERPL-MCNC: 2.7 NG/ML
CHLORIDE SERPL-SCNC: 99 MMOL/L
CO2 SERPL-SCNC: 26 MMOL/L
CREAT SERPL-MCNC: 1.07 MG/DL
EGFR: 53 ML/MIN/1.73M2
GLUCOSE SERPL-MCNC: 108 MG/DL
POTASSIUM SERPL-SCNC: 3.6 MMOL/L
PROT SERPL-MCNC: 6.5 G/DL
SODIUM SERPL-SCNC: 140 MMOL/L

## 2024-01-01 ENCOUNTER — NON-APPOINTMENT (OUTPATIENT)
Age: 79
End: 2024-01-01

## 2024-01-02 RX ORDER — ANASTROZOLE TABLETS 1 MG/1
1 TABLET ORAL DAILY
Qty: 1 | Refills: 1 | Status: ACTIVE | COMMUNITY
Start: 2022-06-24 | End: 1900-01-01

## 2024-01-26 ENCOUNTER — APPOINTMENT (OUTPATIENT)
Dept: ULTRASOUND IMAGING | Facility: CLINIC | Age: 79
End: 2024-01-26

## 2024-01-26 ENCOUNTER — APPOINTMENT (OUTPATIENT)
Dept: MAMMOGRAPHY | Facility: CLINIC | Age: 79
End: 2024-01-26

## 2024-05-22 NOTE — ED ADULT NURSE NOTE - CAS DISCH CONDITION
Your recent labs revealed an elevation in your fasting blood sugar.  This indicates that you may be at risk for developing diabetes.  You can reduce this risk by reducing or eliminating sweets from your diet, using wheat bread/pasta, limiting potato intake, and walking at least 30 minutes per day.  We will do follow bloodwork at your next appointment.    
Stable

## 2024-06-13 ENCOUNTER — OUTPATIENT (OUTPATIENT)
Dept: OUTPATIENT SERVICES | Facility: HOSPITAL | Age: 79
LOS: 1 days | Discharge: HOME | End: 2024-06-13

## 2024-06-13 DIAGNOSIS — C50.912 MALIGNANT NEOPLASM OF UNSPECIFIED SITE OF LEFT FEMALE BREAST: ICD-10-CM

## 2024-06-13 DIAGNOSIS — Z98.890 OTHER SPECIFIED POSTPROCEDURAL STATES: Chronic | ICD-10-CM

## 2024-06-13 DIAGNOSIS — Z98.51 TUBAL LIGATION STATUS: Chronic | ICD-10-CM

## 2024-06-13 DIAGNOSIS — Z98.49 CATARACT EXTRACTION STATUS, UNSPECIFIED EYE: Chronic | ICD-10-CM

## 2024-06-13 DIAGNOSIS — S92.912A UNSPECIFIED FRACTURE OF LEFT TOE(S), INITIAL ENCOUNTER FOR CLOSED FRACTURE: Chronic | ICD-10-CM

## 2024-06-17 ENCOUNTER — APPOINTMENT (OUTPATIENT)
Dept: HEMATOLOGY ONCOLOGY | Facility: CLINIC | Age: 79
End: 2024-06-17

## 2024-06-21 RX ORDER — VALSARTAN AND HYDROCHLOROTHIAZIDE 320; 12.5 MG/1; MG/1
320-12.5 TABLET, FILM COATED ORAL
Qty: 30 | Refills: 0 | Status: ACTIVE | COMMUNITY
Start: 2019-11-13 | End: 1900-01-01

## 2024-06-21 RX ORDER — MIRTAZAPINE 45 MG/1
45 TABLET, FILM COATED ORAL
Qty: 30 | Refills: 0 | Status: ACTIVE | COMMUNITY
Start: 2020-12-08 | End: 1900-01-01

## 2024-06-21 RX ORDER — VENLAFAXINE HYDROCHLORIDE 75 MG/1
75 CAPSULE, EXTENDED RELEASE ORAL
Qty: 30 | Refills: 0 | Status: ACTIVE | COMMUNITY
Start: 2022-06-21 | End: 1900-01-01

## 2024-06-21 RX ORDER — ATORVASTATIN CALCIUM 20 MG/1
20 TABLET, FILM COATED ORAL
Qty: 30 | Refills: 0 | Status: ACTIVE | COMMUNITY
Start: 2019-11-13 | End: 1900-01-01

## 2024-07-01 ENCOUNTER — RX RENEWAL (OUTPATIENT)
Age: 79
End: 2024-07-01

## 2024-07-15 ENCOUNTER — APPOINTMENT (OUTPATIENT)
Dept: INTERNAL MEDICINE | Facility: CLINIC | Age: 79
End: 2024-07-15
Payer: MEDICARE

## 2024-07-15 VITALS
WEIGHT: 146 LBS | HEART RATE: 91 BPM | DIASTOLIC BLOOD PRESSURE: 84 MMHG | HEIGHT: 60 IN | BODY MASS INDEX: 28.66 KG/M2 | SYSTOLIC BLOOD PRESSURE: 134 MMHG | TEMPERATURE: 98.3 F | OXYGEN SATURATION: 95 %

## 2024-07-15 DIAGNOSIS — F41.8 OTHER SPECIFIED ANXIETY DISORDERS: ICD-10-CM

## 2024-07-15 DIAGNOSIS — F41.9 ANXIETY DISORDER, UNSPECIFIED: ICD-10-CM

## 2024-07-15 DIAGNOSIS — E78.5 HYPERLIPIDEMIA, UNSPECIFIED: ICD-10-CM

## 2024-07-15 DIAGNOSIS — I10 ESSENTIAL (PRIMARY) HYPERTENSION: ICD-10-CM

## 2024-07-15 DIAGNOSIS — M81.0 AGE-RELATED OSTEOPOROSIS W/OUT CURRENT PATHOLOGICAL FRACTURE: ICD-10-CM

## 2024-07-15 PROCEDURE — 99214 OFFICE O/P EST MOD 30 MIN: CPT

## 2024-07-15 PROCEDURE — 36415 COLL VENOUS BLD VENIPUNCTURE: CPT

## 2024-07-15 PROCEDURE — G0439: CPT

## 2024-07-16 LAB
ALBUMIN SERPL ELPH-MCNC: 4.4 G/DL
ALP BLD-CCNC: 120 U/L
ALT SERPL-CCNC: 21 U/L
ANION GAP SERPL CALC-SCNC: 14 MMOL/L
APPEARANCE: CLEAR
AST SERPL-CCNC: 18 U/L
BACTERIA: ABNORMAL /HPF
BASOPHILS # BLD AUTO: 0.08 K/UL
BASOPHILS NFR BLD AUTO: 0.7 %
BILIRUB SERPL-MCNC: 0.7 MG/DL
BILIRUBIN URINE: NEGATIVE
BLOOD URINE: NEGATIVE
BUN SERPL-MCNC: 23 MG/DL
CALCIUM SERPL-MCNC: 10.4 MG/DL
CAST: 2 /LPF
CHLORIDE SERPL-SCNC: 102 MMOL/L
CHOLEST SERPL-MCNC: 203 MG/DL
CO2 SERPL-SCNC: 27 MMOL/L
COLOR: NORMAL
CREAT SERPL-MCNC: 0.95 MG/DL
EGFR: 61 ML/MIN/1.73M2
EOSINOPHIL # BLD AUTO: 0.27 K/UL
EOSINOPHIL NFR BLD AUTO: 2.5 %
EPITHELIAL CELLS: 11 /HPF
GLUCOSE QUALITATIVE U: NEGATIVE MG/DL
GLUCOSE SERPL-MCNC: 120 MG/DL
HCT VFR BLD CALC: 47.2 %
HDLC SERPL-MCNC: 51 MG/DL
HGB BLD-MCNC: 14.5 G/DL
IMM GRANULOCYTES NFR BLD AUTO: 0.3 %
KETONES URINE: ABNORMAL MG/DL
LDLC SERPL CALC-MCNC: 130 MG/DL
LEUKOCYTE ESTERASE URINE: ABNORMAL
LYMPHOCYTES # BLD AUTO: 2.5 K/UL
LYMPHOCYTES NFR BLD AUTO: 23.2 %
MAN DIFF?: NORMAL
MCHC RBC-ENTMCNC: 27.4 PG
MCHC RBC-ENTMCNC: 30.7 GM/DL
MCV RBC AUTO: 89.2 FL
MICROSCOPIC-UA: NORMAL
MONOCYTES # BLD AUTO: 0.87 K/UL
MONOCYTES NFR BLD AUTO: 8.1 %
NEUTROPHILS # BLD AUTO: 7.01 K/UL
NEUTROPHILS NFR BLD AUTO: 65.2 %
NITRITE URINE: POSITIVE
NONHDLC SERPL-MCNC: 152 MG/DL
PH URINE: 6
PLATELET # BLD AUTO: 282 K/UL
POTASSIUM SERPL-SCNC: 3.7 MMOL/L
PROT SERPL-MCNC: 6.7 G/DL
PROTEIN URINE: NORMAL MG/DL
RBC # BLD: 5.29 M/UL
RBC # FLD: 15.2 %
RED BLOOD CELLS URINE: 2 /HPF
SODIUM SERPL-SCNC: 143 MMOL/L
SPECIFIC GRAVITY URINE: 1.02
TRIGL SERPL-MCNC: 119 MG/DL
TSH SERPL-ACNC: 2.48 UIU/ML
UROBILINOGEN URINE: 1 MG/DL
WBC # FLD AUTO: 10.76 K/UL
WHITE BLOOD CELLS URINE: 9 /HPF

## 2024-08-26 ENCOUNTER — RX RENEWAL (OUTPATIENT)
Age: 79
End: 2024-08-26

## 2024-09-10 ENCOUNTER — RX RENEWAL (OUTPATIENT)
Age: 79
End: 2024-09-10

## 2024-10-09 ENCOUNTER — RX RENEWAL (OUTPATIENT)
Age: 79
End: 2024-10-09

## 2024-10-23 NOTE — H&P PST ADULT - HISTORY OF PRESENT ILLNESS
Biliary drain flushed  with 10 cc of normal saline as per order   77 year old female, poor historian, with PMH of COPD with emphysema, Depression, Anxiety, HLD, Osteoarthritis, LBBB, Memory loss disorder, presents to PST with pre op diagnosis of Malignant neoplasm of left female breast for pre op evaluation prior to scheduled surgery- Lumpectomy with Magseed localization, mastectomy partial, axillary lymphadenectomy. 77 year old female, poor historian, with PMH of COPD with emphysema, Depression, Anxiety, HLD, Osteoarthritis, LBBB, Memory loss disorder, presents to PST with pre op diagnosis of Malignant neoplasm of left female breast for pre op evaluation prior to scheduled surgery- Lumpectomy with Magseed localization, mastectomy partial, axillary lymphadenectomy.( Information obtained from [employer])

## 2025-01-13 ENCOUNTER — RX RENEWAL (OUTPATIENT)
Age: 80
End: 2025-01-13

## 2025-02-10 ENCOUNTER — RX RENEWAL (OUTPATIENT)
Age: 80
End: 2025-02-10

## 2025-02-19 ENCOUNTER — RX RENEWAL (OUTPATIENT)
Age: 80
End: 2025-02-19

## 2025-03-10 ENCOUNTER — RX RENEWAL (OUTPATIENT)
Age: 80
End: 2025-03-10

## 2025-05-05 ENCOUNTER — RX RENEWAL (OUTPATIENT)
Age: 80
End: 2025-05-05

## 2025-05-28 NOTE — PATIENT PROFILE ADULT - IS THERE A SUSPICION OF ABUSE/NEGLIGENCE?
PT is a 61 year old A&OX4 female with PMH of hypothyroidism who presents to the ED from home with c/o mass to the roof of her mouth for 2 months. PT states it became painful over the last day. PT saw her dentist who referred her to an oral surgeon but PT has been unable to get an appointment. PT denies fevers/chills, difficulty swallowing, and SOB. PT is resting comfortably in bed, breathing unlabored on room air, and speaking in complete sentences. ~5 cm mass noted to left-side of  palate. Abdomen is soft, non-tender, and non-distended. Skin is warm and dry, no diaphoresis noted. No edema noted to B/L extremities. Strong strength in B/L extremities, sensation intact. PT ambulatory with steady gait. Safety and comfort maintained. no

## 2025-05-30 ENCOUNTER — APPOINTMENT (OUTPATIENT)
Dept: INTERNAL MEDICINE | Facility: CLINIC | Age: 80
End: 2025-05-30
Payer: MEDICARE

## 2025-05-30 VITALS
OXYGEN SATURATION: 95 % | HEIGHT: 60 IN | HEART RATE: 92 BPM | RESPIRATION RATE: 16 BRPM | SYSTOLIC BLOOD PRESSURE: 115 MMHG | DIASTOLIC BLOOD PRESSURE: 75 MMHG | WEIGHT: 142 LBS | BODY MASS INDEX: 27.88 KG/M2 | TEMPERATURE: 99 F

## 2025-05-30 DIAGNOSIS — H91.90 UNSPECIFIED HEARING LOSS, UNSPECIFIED EAR: ICD-10-CM

## 2025-05-30 DIAGNOSIS — F41.8 OTHER SPECIFIED ANXIETY DISORDERS: ICD-10-CM

## 2025-05-30 DIAGNOSIS — F41.9 ANXIETY DISORDER, UNSPECIFIED: ICD-10-CM

## 2025-05-30 DIAGNOSIS — I10 ESSENTIAL (PRIMARY) HYPERTENSION: ICD-10-CM

## 2025-05-30 PROCEDURE — G2211 COMPLEX E/M VISIT ADD ON: CPT

## 2025-05-30 PROCEDURE — 99214 OFFICE O/P EST MOD 30 MIN: CPT

## 2025-06-10 ENCOUNTER — RX RENEWAL (OUTPATIENT)
Age: 80
End: 2025-06-10

## 2025-07-28 ENCOUNTER — RX RENEWAL (OUTPATIENT)
Age: 80
End: 2025-07-28

## (undated) DEVICE — SOL IRR POUR H2O 250ML

## (undated) DEVICE — BLADE SCALPEL SAFETYLOCK #15

## (undated) DEVICE — SPECIMEN CONTAINER 100ML

## (undated) DEVICE — DRSG CURITY GAUZE SPONGE 4 X 4" 12-PLY

## (undated) DEVICE — LIGASURE SMALL JAW

## (undated) DEVICE — ELCTR BOVIE BLADE .75"

## (undated) DEVICE — DRSG KLING 6"

## (undated) DEVICE — DRAPE 1/2 SHEET 40X57"

## (undated) DEVICE — VENODYNE/SCD SLEEVE CALF MEDIUM

## (undated) DEVICE — GLV 7 PROTEXIS (WHITE)

## (undated) DEVICE — DRSG STERISTRIPS 0.5 X 4"

## (undated) DEVICE — DRAPE MAYO STAND 30"

## (undated) DEVICE — STAPLER SKIN VISI-STAT 35 WIDE

## (undated) DEVICE — SHEATH SURG GUIDE SCOUT DISP STRL

## (undated) DEVICE — DRAPE INSTRUMENT POUCH 6.75" X 11"

## (undated) DEVICE — ELCTR BOVIE TIP BLADE VALLEYLAB 6.5"

## (undated) DEVICE — DRSG OPSITE 13.75 X 4"

## (undated) DEVICE — DRSG XEROFORM 5 X 9"

## (undated) DEVICE — DRSG TEGADERM 6"X8"

## (undated) DEVICE — DRAIN JACKSON PRATT 10MM FLAT FULL NO TROCAR

## (undated) DEVICE — DRAIN RESERVOIR FOR JACKSON PRATT 100CC CARDINAL

## (undated) DEVICE — GOWN TRIMAX LG

## (undated) DEVICE — GAMMA SLEEVE DISPOSABLE

## (undated) DEVICE — DRAPE LIGHT HANDLE COVER (BLUE)

## (undated) DEVICE — SUT SOFSILK 2-0 18" C-23

## (undated) DEVICE — SUT POLYSORB 3-0 30" V-20 UNDYED

## (undated) DEVICE — BLADE SCALPEL SAFETYLOCK #10

## (undated) DEVICE — DRSG OPSITE 2.5 X 2"

## (undated) DEVICE — DRAPE 3/4 SHEET 52X76"

## (undated) DEVICE — LAP PAD 18 X 18"

## (undated) DEVICE — ELCTR BOVIE BLADE 3/4" EXTENDED LENGTH 6"

## (undated) DEVICE — FOLEY TRAY 16FR 5CC LTX UMETER CLOSED

## (undated) DEVICE — SUT BIOSYN 4-0 18" P-12

## (undated) DEVICE — POSITIONER FOAM EGG CRATE ULNAR 2PCS (PINK)

## (undated) DEVICE — DRAIN BLAKE 15FR BARD CHANNEL

## (undated) DEVICE — DRAPE TOWEL BLUE 17" X 24"

## (undated) DEVICE — SOL IRR POUR NS 0.9% 500ML

## (undated) DEVICE — MARKING PEN W RULER

## (undated) DEVICE — WARMING BLANKET LOWER ADULT

## (undated) DEVICE — MEDICATION LABELS W MARKER

## (undated) DEVICE — PACK MAJOR ABDOMINAL WITH LAP